# Patient Record
Sex: MALE | Race: WHITE | Employment: UNEMPLOYED | URBAN - METROPOLITAN AREA
[De-identification: names, ages, dates, MRNs, and addresses within clinical notes are randomized per-mention and may not be internally consistent; named-entity substitution may affect disease eponyms.]

---

## 2020-01-14 ENCOUNTER — APPOINTMENT (OUTPATIENT)
Dept: ULTRASOUND IMAGING | Age: 45
DRG: 439 | End: 2020-01-14
Attending: EMERGENCY MEDICINE
Payer: COMMERCIAL

## 2020-01-14 ENCOUNTER — APPOINTMENT (OUTPATIENT)
Dept: CT IMAGING | Age: 45
DRG: 439 | End: 2020-01-14
Attending: INTERNAL MEDICINE
Payer: COMMERCIAL

## 2020-01-14 ENCOUNTER — HOSPITAL ENCOUNTER (INPATIENT)
Age: 45
LOS: 8 days | Discharge: HOME | DRG: 439 | End: 2020-01-22
Attending: EMERGENCY MEDICINE | Admitting: INTERNAL MEDICINE
Payer: COMMERCIAL

## 2020-01-14 DIAGNOSIS — F10.10 ALCOHOL ABUSE: Chronic | ICD-10-CM

## 2020-01-14 DIAGNOSIS — N17.9 ACUTE KIDNEY INJURY (HCC): ICD-10-CM

## 2020-01-14 DIAGNOSIS — E87.1 HYPONATREMIA: ICD-10-CM

## 2020-01-14 DIAGNOSIS — K85.20 ALCOHOL-INDUCED ACUTE PANCREATITIS, UNSPECIFIED COMPLICATION STATUS: Primary | ICD-10-CM

## 2020-01-14 PROBLEM — R73.9 HYPERGLYCEMIA: Status: ACTIVE | Noted: 2020-01-14

## 2020-01-14 PROBLEM — Z72.0 TOBACCO USE: Chronic | Status: ACTIVE | Noted: 2020-01-14

## 2020-01-14 PROBLEM — G89.29 CHRONIC PAIN: Chronic | Status: ACTIVE | Noted: 2020-01-14

## 2020-01-14 PROBLEM — I10 HYPERTENSION: Status: ACTIVE | Noted: 2020-01-14

## 2020-01-14 PROBLEM — K85.90 PANCREATITIS: Status: ACTIVE | Noted: 2020-01-14

## 2020-01-14 PROBLEM — F32.A DEPRESSION: Chronic | Status: ACTIVE | Noted: 2020-01-14

## 2020-01-14 LAB
ALBUMIN SERPL-MCNC: 3 G/DL (ref 3.5–5)
ALBUMIN/GLOB SERPL: 0.8 {RATIO} (ref 1.2–3.5)
ALP SERPL-CCNC: 196 U/L (ref 50–136)
ALT SERPL-CCNC: 239 U/L (ref 12–65)
AMPHET UR QL SCN: NEGATIVE
ANION GAP SERPL CALC-SCNC: 14 MMOL/L (ref 7–16)
APPEARANCE UR: ABNORMAL
AST SERPL-CCNC: 229 U/L (ref 15–37)
ATRIAL RATE: 146 BPM
BACTERIA URNS QL MICRO: 0 /HPF
BACTERIA URNS QL MICRO: ABNORMAL /HPF
BARBITURATES UR QL SCN: NEGATIVE
BENZODIAZ UR QL: NEGATIVE
BILIRUB SERPL-MCNC: 3.3 MG/DL (ref 0.2–1.1)
BILIRUB UR QL: ABNORMAL
BUN SERPL-MCNC: 15 MG/DL (ref 6–23)
CALCIUM SERPL-MCNC: 9.2 MG/DL (ref 8.3–10.4)
CALCULATED P AXIS, ECG09: 79 DEGREES
CALCULATED R AXIS, ECG10: 59 DEGREES
CALCULATED T AXIS, ECG11: 38 DEGREES
CANNABINOIDS UR QL SCN: POSITIVE
CASTS URNS QL MICRO: ABNORMAL /LPF
CASTS URNS QL MICRO: ABNORMAL /LPF
CHLORIDE SERPL-SCNC: 88 MMOL/L (ref 98–107)
CO2 SERPL-SCNC: 25 MMOL/L (ref 21–32)
COCAINE UR QL SCN: NEGATIVE
COLOR UR: ABNORMAL
CREAT SERPL-MCNC: 1.67 MG/DL (ref 0.8–1.5)
CRYSTALS URNS QL MICRO: 0 /LPF
DIAGNOSIS, 93000: NORMAL
EPI CELLS #/AREA URNS HPF: ABNORMAL /HPF
EST. AVERAGE GLUCOSE BLD GHB EST-MCNC: 120 MG/DL
GLOBULIN SER CALC-MCNC: 3.8 G/DL (ref 2.3–3.5)
GLUCOSE SERPL-MCNC: 149 MG/DL (ref 65–100)
GLUCOSE UR STRIP.AUTO-MCNC: 100 MG/DL
HBA1C MFR BLD: 5.8 % (ref 4.8–6)
HGB UR QL STRIP: NEGATIVE
INR PPP: 0.9
KETONES UR QL STRIP.AUTO: ABNORMAL MG/DL
LACTATE BLD-SCNC: 5.83 MMOL/L (ref 0.5–1.9)
LACTATE SERPL-SCNC: 5.1 MMOL/L (ref 0.4–2)
LEUKOCYTE ESTERASE UR QL STRIP.AUTO: ABNORMAL
LIPASE SERPL-CCNC: 2180 U/L (ref 73–393)
METHADONE UR QL: NEGATIVE
MUCOUS THREADS URNS QL MICRO: ABNORMAL /LPF
NITRITE UR QL STRIP.AUTO: POSITIVE
OPIATES UR QL: POSITIVE
OTHER OBSERVATIONS,UCOM: ABNORMAL
P-R INTERVAL, ECG05: 142 MS
PCP UR QL: NEGATIVE
PH UR STRIP: 6 [PH] (ref 5–9)
POTASSIUM SERPL-SCNC: 3.9 MMOL/L (ref 3.5–5.1)
PROT SERPL-MCNC: 6.8 G/DL (ref 6.3–8.2)
PROT UR STRIP-MCNC: 30 MG/DL
PROTHROMBIN TIME: 12.4 SEC (ref 11.7–14.5)
Q-T INTERVAL, ECG07: 270 MS
QRS DURATION, ECG06: 84 MS
QTC CALCULATION (BEZET), ECG08: 420 MS
RBC #/AREA URNS HPF: ABNORMAL /HPF
SODIUM SERPL-SCNC: 127 MMOL/L (ref 136–145)
SP GR UR REFRACTOMETRY: 1.02 (ref 1–1.02)
UROBILINOGEN UR QL STRIP.AUTO: 1 EU/DL (ref 0.2–1)
VENTRICULAR RATE, ECG03: 146 BPM
WBC URNS QL MICRO: ABNORMAL /HPF
WBC URNS QL MICRO: ABNORMAL /HPF

## 2020-01-14 PROCEDURE — 96375 TX/PRO/DX INJ NEW DRUG ADDON: CPT | Performed by: EMERGENCY MEDICINE

## 2020-01-14 PROCEDURE — 96361 HYDRATE IV INFUSION ADD-ON: CPT | Performed by: EMERGENCY MEDICINE

## 2020-01-14 PROCEDURE — 81001 URINALYSIS AUTO W/SCOPE: CPT

## 2020-01-14 PROCEDURE — 74011636320 HC RX REV CODE- 636/320: Performed by: EMERGENCY MEDICINE

## 2020-01-14 PROCEDURE — 99285 EMERGENCY DEPT VISIT HI MDM: CPT | Performed by: EMERGENCY MEDICINE

## 2020-01-14 PROCEDURE — 77030040361 HC SLV COMPR DVT MDII -B

## 2020-01-14 PROCEDURE — 80307 DRUG TEST PRSMV CHEM ANLYZR: CPT

## 2020-01-14 PROCEDURE — 83036 HEMOGLOBIN GLYCOSYLATED A1C: CPT

## 2020-01-14 PROCEDURE — 65660000000 HC RM CCU STEPDOWN

## 2020-01-14 PROCEDURE — 74011250636 HC RX REV CODE- 250/636: Performed by: EMERGENCY MEDICINE

## 2020-01-14 PROCEDURE — 74011000250 HC RX REV CODE- 250: Performed by: INTERNAL MEDICINE

## 2020-01-14 PROCEDURE — 74011250637 HC RX REV CODE- 250/637: Performed by: INTERNAL MEDICINE

## 2020-01-14 PROCEDURE — 83690 ASSAY OF LIPASE: CPT

## 2020-01-14 PROCEDURE — 83605 ASSAY OF LACTIC ACID: CPT

## 2020-01-14 PROCEDURE — 76705 ECHO EXAM OF ABDOMEN: CPT

## 2020-01-14 PROCEDURE — 74011250636 HC RX REV CODE- 250/636: Performed by: INTERNAL MEDICINE

## 2020-01-14 PROCEDURE — 81003 URINALYSIS AUTO W/O SCOPE: CPT | Performed by: EMERGENCY MEDICINE

## 2020-01-14 PROCEDURE — 85025 COMPLETE CBC W/AUTO DIFF WBC: CPT

## 2020-01-14 PROCEDURE — 74176 CT ABD & PELVIS W/O CONTRAST: CPT

## 2020-01-14 PROCEDURE — 80053 COMPREHEN METABOLIC PANEL: CPT

## 2020-01-14 PROCEDURE — 81015 MICROSCOPIC EXAM OF URINE: CPT

## 2020-01-14 PROCEDURE — 93005 ELECTROCARDIOGRAM TRACING: CPT | Performed by: EMERGENCY MEDICINE

## 2020-01-14 PROCEDURE — 96376 TX/PRO/DX INJ SAME DRUG ADON: CPT | Performed by: EMERGENCY MEDICINE

## 2020-01-14 PROCEDURE — 36415 COLL VENOUS BLD VENIPUNCTURE: CPT

## 2020-01-14 PROCEDURE — 96374 THER/PROPH/DIAG INJ IV PUSH: CPT | Performed by: EMERGENCY MEDICINE

## 2020-01-14 PROCEDURE — 85610 PROTHROMBIN TIME: CPT

## 2020-01-14 PROCEDURE — 77030027138 HC INCENT SPIROMETER -A

## 2020-01-14 RX ORDER — SODIUM CHLORIDE 0.9 % (FLUSH) 0.9 %
5-40 SYRINGE (ML) INJECTION EVERY 8 HOURS
Status: DISCONTINUED | OUTPATIENT
Start: 2020-01-14 | End: 2020-01-22 | Stop reason: HOSPADM

## 2020-01-14 RX ORDER — LORAZEPAM 2 MG/ML
1 INJECTION INTRAMUSCULAR
Status: DISCONTINUED | OUTPATIENT
Start: 2020-01-14 | End: 2020-01-18

## 2020-01-14 RX ORDER — ONDANSETRON 2 MG/ML
4 INJECTION INTRAMUSCULAR; INTRAVENOUS
Status: DISCONTINUED | OUTPATIENT
Start: 2020-01-14 | End: 2020-01-22 | Stop reason: HOSPADM

## 2020-01-14 RX ORDER — ALBUTEROL SULFATE 0.83 MG/ML
2.5 SOLUTION RESPIRATORY (INHALATION)
Status: DISCONTINUED | OUTPATIENT
Start: 2020-01-14 | End: 2020-01-22 | Stop reason: HOSPADM

## 2020-01-14 RX ORDER — NALOXONE HYDROCHLORIDE 0.4 MG/ML
0.4 INJECTION, SOLUTION INTRAMUSCULAR; INTRAVENOUS; SUBCUTANEOUS AS NEEDED
Status: DISCONTINUED | OUTPATIENT
Start: 2020-01-14 | End: 2020-01-22 | Stop reason: HOSPADM

## 2020-01-14 RX ORDER — SODIUM CHLORIDE 0.9 % (FLUSH) 0.9 %
5-40 SYRINGE (ML) INJECTION AS NEEDED
Status: DISCONTINUED | OUTPATIENT
Start: 2020-01-14 | End: 2020-01-22 | Stop reason: HOSPADM

## 2020-01-14 RX ORDER — HYDRALAZINE HYDROCHLORIDE 20 MG/ML
10 INJECTION INTRAMUSCULAR; INTRAVENOUS
Status: DISCONTINUED | OUTPATIENT
Start: 2020-01-14 | End: 2020-01-22 | Stop reason: HOSPADM

## 2020-01-14 RX ORDER — ONDANSETRON 2 MG/ML
4 INJECTION INTRAMUSCULAR; INTRAVENOUS
Status: COMPLETED | OUTPATIENT
Start: 2020-01-14 | End: 2020-01-14

## 2020-01-14 RX ORDER — SODIUM CHLORIDE 9 MG/ML
75 INJECTION, SOLUTION INTRAVENOUS CONTINUOUS
Status: DISCONTINUED | OUTPATIENT
Start: 2020-01-14 | End: 2020-01-21

## 2020-01-14 RX ORDER — VENLAFAXINE HYDROCHLORIDE 75 MG/1
150 CAPSULE, EXTENDED RELEASE ORAL
Status: DISCONTINUED | OUTPATIENT
Start: 2020-01-15 | End: 2020-01-22 | Stop reason: HOSPADM

## 2020-01-14 RX ORDER — MORPHINE SULFATE 4 MG/ML
4 INJECTION INTRAVENOUS
Status: COMPLETED | OUTPATIENT
Start: 2020-01-14 | End: 2020-01-14

## 2020-01-14 RX ORDER — METOPROLOL TARTRATE 5 MG/5ML
5 INJECTION INTRAVENOUS
Status: DISCONTINUED | OUTPATIENT
Start: 2020-01-14 | End: 2020-01-22 | Stop reason: HOSPADM

## 2020-01-14 RX ORDER — HYDROMORPHONE HYDROCHLORIDE 1 MG/ML
1 INJECTION, SOLUTION INTRAMUSCULAR; INTRAVENOUS; SUBCUTANEOUS
Status: DISCONTINUED | OUTPATIENT
Start: 2020-01-14 | End: 2020-01-22 | Stop reason: HOSPADM

## 2020-01-14 RX ORDER — HYDROMORPHONE HYDROCHLORIDE 1 MG/ML
1 INJECTION, SOLUTION INTRAMUSCULAR; INTRAVENOUS; SUBCUTANEOUS
Status: COMPLETED | OUTPATIENT
Start: 2020-01-14 | End: 2020-01-14

## 2020-01-14 RX ORDER — IBUPROFEN 200 MG
1 TABLET ORAL EVERY 24 HOURS
Status: DISCONTINUED | OUTPATIENT
Start: 2020-01-14 | End: 2020-01-22 | Stop reason: HOSPADM

## 2020-01-14 RX ADMIN — DIATRIZOATE MEGLUMINE AND DIATRIZOATE SODIUM 15 ML: 660; 100 LIQUID ORAL; RECTAL at 17:14

## 2020-01-14 RX ADMIN — HYDROMORPHONE HYDROCHLORIDE 1 MG: 1 INJECTION, SOLUTION INTRAMUSCULAR; INTRAVENOUS; SUBCUTANEOUS at 15:46

## 2020-01-14 RX ADMIN — HYDROMORPHONE HYDROCHLORIDE 1 MG: 1 INJECTION, SOLUTION INTRAMUSCULAR; INTRAVENOUS; SUBCUTANEOUS at 20:04

## 2020-01-14 RX ADMIN — MORPHINE SULFATE 4 MG: 4 INJECTION INTRAVENOUS at 14:14

## 2020-01-14 RX ADMIN — SODIUM CHLORIDE 1000 ML: 900 INJECTION, SOLUTION INTRAVENOUS at 14:14

## 2020-01-14 RX ADMIN — Medication 10 ML: at 18:48

## 2020-01-14 RX ADMIN — METOPROLOL TARTRATE 5 MG: 5 INJECTION INTRAVENOUS at 20:03

## 2020-01-14 RX ADMIN — SODIUM CHLORIDE 1000 ML: 900 INJECTION, SOLUTION INTRAVENOUS at 12:59

## 2020-01-14 RX ADMIN — MORPHINE SULFATE 4 MG: 4 INJECTION INTRAVENOUS at 12:58

## 2020-01-14 RX ADMIN — SODIUM CHLORIDE 150 ML/HR: 900 INJECTION, SOLUTION INTRAVENOUS at 18:47

## 2020-01-14 RX ADMIN — ONDANSETRON 4 MG: 2 INJECTION INTRAMUSCULAR; INTRAVENOUS at 12:58

## 2020-01-14 RX ADMIN — HYDRALAZINE HYDROCHLORIDE 10 MG: 20 INJECTION, SOLUTION INTRAMUSCULAR; INTRAVENOUS at 16:53

## 2020-01-14 NOTE — ED PROVIDER NOTES
43-year-old white male presents with epigastric pain which began yesterday morning. Patient has had no intake since then due to the pain. He denies vomiting, fever, diarrhea, chest pain and shortness of breath. Does drink alcohol. Has never had pancreatitis before. Takes Suboxone for opiate addiction. Last dose was yesterday evening. The history is provided by the patient. Abdominal Pain    Pertinent negatives include no fever, no diarrhea, no nausea, no vomiting, no dysuria, no headaches, no chest pain and no back pain. No past medical history on file. No past surgical history on file. No family history on file.     Social History     Socioeconomic History    Marital status: SINGLE     Spouse name: Not on file    Number of children: Not on file    Years of education: Not on file    Highest education level: Not on file   Occupational History    Not on file   Social Needs    Financial resource strain: Not on file    Food insecurity:     Worry: Not on file     Inability: Not on file    Transportation needs:     Medical: Not on file     Non-medical: Not on file   Tobacco Use    Smoking status: Not on file   Substance and Sexual Activity    Alcohol use: Not on file    Drug use: Not on file    Sexual activity: Not on file   Lifestyle    Physical activity:     Days per week: Not on file     Minutes per session: Not on file    Stress: Not on file   Relationships    Social connections:     Talks on phone: Not on file     Gets together: Not on file     Attends Caodaism service: Not on file     Active member of club or organization: Not on file     Attends meetings of clubs or organizations: Not on file     Relationship status: Not on file    Intimate partner violence:     Fear of current or ex partner: Not on file     Emotionally abused: Not on file     Physically abused: Not on file     Forced sexual activity: Not on file   Other Topics Concern    Not on file   Social History Narrative    Not on file         ALLERGIES: Patient has no known allergies. Review of Systems   Constitutional: Negative for fever. HENT: Negative for congestion. Respiratory: Negative for cough and shortness of breath. Cardiovascular: Negative for chest pain. Gastrointestinal: Positive for abdominal pain. Negative for diarrhea, nausea and vomiting. Genitourinary: Negative for dysuria. Musculoskeletal: Negative for back pain and neck pain. Skin: Negative for rash. Neurological: Negative for headaches. Vitals:    01/14/20 1212 01/14/20 1245 01/14/20 1252 01/14/20 1259   BP: (!) 136/100 (!) 200/126 (!) 170/111 (!) 170/117   Pulse: 64 (!) 131 (!) 135 (!) 129   Resp: 17      Temp: 98 °F (36.7 °C)      SpO2: 98% 98% 96% 96%            Physical Exam  Vitals signs and nursing note reviewed. Constitutional:       General: He is not in acute distress. Appearance: He is well-developed. He is ill-appearing and diaphoretic. HENT:      Head: Normocephalic and atraumatic. Nose: Nose normal.      Mouth/Throat:      Mouth: Mucous membranes are moist.      Pharynx: Oropharynx is clear. Eyes:      Conjunctiva/sclera: Conjunctivae normal.      Pupils: Pupils are equal, round, and reactive to light. Neck:      Musculoskeletal: Normal range of motion and neck supple. Cardiovascular:      Rate and Rhythm: Regular rhythm. Tachycardia present. Heart sounds: No murmur. Pulmonary:      Effort: Pulmonary effort is normal.      Breath sounds: Normal breath sounds. No rales. Abdominal:      Palpations: Abdomen is soft. Tenderness: There is tenderness in the right upper quadrant, epigastric area and left upper quadrant. There is no guarding or rebound. Musculoskeletal: Normal range of motion. General: No swelling. Skin:     General: Skin is warm. Neurological:      Mental Status: He is alert and oriented to person, place, and time. Mental status is at baseline. Psychiatric:         Mood and Affect: Mood normal.         Behavior: Behavior normal.          MDM  Number of Diagnoses or Management Options  Diagnosis management comments: Lab work shows elevated liver enzymes and lipase 2180, hyponatremia 127 and a mild dehydration with creatinine 1.67. Elevated likely due to hemoconcentration. Gallbladder ultrasound shows no evidence of gallstones or obstruction. Patient is still uncomfortable after 2 doses of morphine and 2 L of fluid. We will switch to Dilaudid. Suspect I am having difficulty controlling his pain due to the fact that the patient is on Suboxone. Suspect his pancreatitis is alcohol related. Will discuss with hospitalist for admission.        Amount and/or Complexity of Data Reviewed  Clinical lab tests: ordered and reviewed  Tests in the radiology section of CPT®: ordered and reviewed  Tests in the medicine section of CPT®: ordered and reviewed  Discuss the patient with other providers: yes  Independent visualization of images, tracings, or specimens: yes    Risk of Complications, Morbidity, and/or Mortality  Presenting problems: moderate  Diagnostic procedures: moderate  Management options: moderate           Procedures

## 2020-01-14 NOTE — ED NOTES
TRANSFER - IN REPORT:    Verbal report received from [facility], RN(name) on Jameel Melgar  being received from [facility](unit) for routine progression of care      Report consisted of patients Situation, Background, Assessment and   Recommendations(SBAR). Information from the following report(s) SBAR, ED Summary, Intake/Output, MAR, Recent Results and Med Rec Status was reviewed with the receiving nurse. Opportunity for questions and clarification was provided. Assessment completed upon patients arrival to unit and care assumed.

## 2020-01-14 NOTE — H&P
Hospitalist H&P Note     Admit Date:  2020 12:33 PM   Name:  Linda Yates   Age:  45 y.o.  :  1975   MRN:  [id number]   PCP:  None  Treatment Team: Attending Provider: Bernarda Huff MD; Primary Nurse: Ramy Barriga RN    HPI:     CC:  Abdominal pain         Mr. Dena Wyatt is a 46 yo male with PMH of ETOH use, tobacco use, depression, chronic pain on suboxone per pain management who is evaluated with 1 day of midabdominal pain. He states that he consumes 6-8 beers daily-no prior withdrawals. Lipase elevated, ABD US shows hepatic steatosis, unable to visualize pancrease. Lactate elevated, with additional hyponatremia, ENOC, elevated liver testing. parents present    10 systems reviewed and negative except as noted in HPI. PMH- ETOH use, tobacco use, depression, chronic pain on suboxone per pain management    Denies prior surgery     No Known Allergies   Social History     Tobacco Use    Smoking status: Not on file   Substance Use Topics    Alcohol use: Not on file      FH- HTN    There is no immunization history on file for this patient. PTA Medications:  None       Objective:     Patient Vitals for the past 24 hrs:   Temp Pulse Resp BP SpO2   20 1330  (!) 115  (!) 175/127 96 %   20 1259  (!) 129  (!) 170/117 96 %   20 1252  (!) 135  (!) 170/111 96 %   20 1245  (!) 131  (!) 200/126 98 %   20 1212 98 °F (36.7 °C) 64 17 (!) 136/100 98 %     Oxygen Therapy  O2 Sat (%): 96 % (20 1330)  Pulse via Oximetry: 115 beats per minute (20 1330)  O2 Device: Room air (20 1212)    Intake/Output Summary (Last 24 hours) at 2020 1644  Last data filed at 2020 1414  Gross per 24 hour   Intake 1000 ml   Output    Net 1000 ml       Physical Exam:  General:    Alert. No distress  Eyes:   Normal sclera. Extraocular movements intact.  PERRLA  ENT:  Normocephalic, atraumatic.  dry mucous membranes  CV:   Regular and tachycardic, No edema  Lungs:  CTAB. No wheezing, rhonchi, or rales. anterior  Abdomen: Tender diffusely, slightly distended. Present BS  Extremities: Warm and dry. .  Neurologic:  grossly intact. Skin:     No rashes or jaundice. Normal coloration  Psych:  Flat affect    I reviewed the labs, imaging, EKGs, telemetry, and other studies done this admission. EKG tracing personally reviewed as sinus tachycardia    Data Review:   Recent Results (from the past 24 hour(s))   CBC WITH AUTOMATED DIFF    Collection Time: 01/14/20 12:23 PM   Result Value Ref Range    WBC 9.7 4.3 - 11.1 K/uL    RBC 5.59 4.23 - 5.6 M/uL    HGB 17.9 (H) 13.6 - 17.2 g/dL    HCT 52.7 (H) 41.1 - 50.3 %    MCV 94.3 79.6 - 97.8 FL    MCH 32.0 26.1 - 32.9 PG    MCHC 34.0 31.4 - 35.0 g/dL    RDW 13.0 11.9 - 14.6 %    PLATELET 202 (L) 661 - 450 K/uL    MPV 13.4 (H) 9.4 - 12.3 FL    ABSOLUTE NRBC 0.00 0.0 - 0.2 K/uL    DF AUTOMATED      NEUTROPHILS 82 (H) 43 - 78 %    LYMPHOCYTES 9 (L) 13 - 44 %    MONOCYTES 6 4.0 - 12.0 %    EOSINOPHILS 2 0.5 - 7.8 %    BASOPHILS 0 0.0 - 2.0 %    IMMATURE GRANULOCYTES 0 0.0 - 5.0 %    ABS. NEUTROPHILS 8.3 (H) 1.7 - 8.2 K/UL    ABS. LYMPHOCYTES 0.9 0.5 - 4.6 K/UL    ABS. MONOCYTES 0.6 0.1 - 1.3 K/UL    ABS. EOSINOPHILS 0.2 0.0 - 0.8 K/UL    ABS. BASOPHILS 0.0 0.0 - 0.2 K/UL    ABS. IMM.  GRANS. 0.0 0.0 - 0.5 K/UL   POC LACTIC ACID    Collection Time: 01/14/20 12:25 PM   Result Value Ref Range    Lactic Acid (POC) 5.83 (H) 0.5 - 1.9 mmol/L   EKG, 12 LEAD, INITIAL    Collection Time: 01/14/20 12:31 PM   Result Value Ref Range    Ventricular Rate 146 BPM    Atrial Rate 146 BPM    P-R Interval 142 ms    QRS Duration 84 ms    Q-T Interval 270 ms    QTC Calculation (Bezet) 420 ms    Calculated P Axis 79 degrees    Calculated R Axis 59 degrees    Calculated T Axis 38 degrees    Diagnosis       Sinus tachycardia  Possible Left atrial enlargement  Possible Anterior infarct , age undetermined  Abnormal ECG  No previous ECGs available  Confirmed by ARNOL RIDDLE (), Alina Beardic ([id number]) on 1/14/2020 1:16:51 PM     LIPASE    Collection Time: 01/14/20 12:51 PM   Result Value Ref Range    Lipase 2,180 (H) 73 - 124 U/L   METABOLIC PANEL, COMPREHENSIVE    Collection Time: 01/14/20 12:51 PM   Result Value Ref Range    Sodium 127 (L) 136 - 145 mmol/L    Potassium 3.9 3.5 - 5.1 mmol/L    Chloride 88 (L) 98 - 107 mmol/L    CO2 25 21 - 32 mmol/L    Anion gap 14 7 - 16 mmol/L    Glucose 149 (H) 65 - 100 mg/dL    BUN 15 6 - 23 MG/DL    Creatinine 1.67 (H) 0.8 - 1.5 MG/DL    GFR est AA 58 (L) >60 ml/min/1.73m2    GFR est non-AA 48 (L) >60 ml/min/1.73m2    Calcium 9.2 8.3 - 10.4 MG/DL    Bilirubin, total 3.3 (H) 0.2 - 1.1 MG/DL    ALT (SGPT) 239 (H) 12 - 65 U/L    AST (SGOT) 229 (H) 15 - 37 U/L    Alk. phosphatase 196 (H) 50 - 136 U/L    Protein, total 6.8 6.3 - 8.2 g/dL    Albumin 3.0 (L) 3.5 - 5.0 g/dL    Globulin 3.8 (H) 2.3 - 3.5 g/dL    A-G Ratio 0.8 (L) 1.2 - 3.5     URINE MICROSCOPIC    Collection Time: 01/14/20  2:24 PM   Result Value Ref Range    WBC 3-5 0 /hpf    RBC 0-3 0 /hpf    Epithelial cells 0-3 0 /hpf    Bacteria TRACE 0 /hpf    Casts HYALINE 0 /lpf    Crystals, urine 0 0 /LPF    Mucus 2+ (H) 0 /lpf       All Micro Results     None          Other Studies:  [employer]    Result Date: 1/14/2020  Right upper quadrant ultrasound: 01/14/2020 HISTORY: Moderate epigastric pain x7 days. Pancreatitis. . Real-time sonography of the right upper quadrant was performed. Comparison: None FINDINGS: There is increased echotexture throughout the liver. There is no intrahepatic biliary ductal dilatation. There is no focal hepatic mass. The gallbladder is anechoic without evidence of cholelithiasis. There is no gallbladder wall thickening or pericholecystic fluid to suggest acute cholecystitis. The common bile duct is normal  measuring 4 mm. The pancreas is unable to be visualized as it was obscured by overlying bowel gas.  The right kidney is unremarkable without hydronephrosis or solid mass. The right kidney was measured at approximately 12.9 cm. The visualized portions of the abdominal aorta and inferior vena cava are unremarkable. There is normal hepatopetal flow within the main portal vein. No free fluid is seen within the right upper quadrant. IMPRESSION:  1. Hepatic steatosis. 2. Nonvisualization of the pancreas due to overlying bowel gas.        Assessment and Plan:     Hospital Problems as of 1/14/2020 Never Reviewed          Codes Class Noted - Resolved POA    * (Principal) Pancreatitis ICD-10-CM: K85.90  ICD-9-CM: 487.6  1/14/2020 - Present Yes        Chronic pain (Chronic) ICD-10-CM: S46.58  ICD-9-CM: 338.29  1/14/2020 - Present Yes        Alcohol abuse (Chronic) ICD-10-CM: F10.10  ICD-9-CM: 305.00  1/14/2020 - Present Yes        Hypertension ICD-10-CM: I10  ICD-9-CM: 401.9  1/14/2020 - Present Yes        Depression (Chronic) ICD-10-CM: F32.9  ICD-9-CM: 515  1/14/2020 - Present Yes        Tobacco use (Chronic) ICD-10-CM: Z72.0  ICD-9-CM: 305.1  1/14/2020 - Present Yes        ENOC (acute kidney injury) ([facility] [address] ICD-10-CM: N17.9  ICD-9-CM: 584.9  1/14/2020 - Present Yes        Hyponatremia ICD-10-CM: E87.1  ICD-9-CM: 276.1  1/14/2020 - Present Yes        Hyperglycemia ICD-10-CM: R73.9  ICD-9-CM: 790.29  1/14/2020 - Present Yes              · ETOH pancreatitis: admit to remote tele, NPO, NS at 150 cc /hr, pain control, CTAP and trend lactate  · ETOH abuse: needs cessation , monitor for withdrawals, supplement thiamine and folate   · Tobacco use: needs cessation, add nicotine patch and prn albuterol   · Hyponatremia: seems due to volume depletion, hydrate and repeat lab  · Hyperglycemia: check A1c  · ELEVATED LIVER TESTING: check INR, hepatitis panel and HIV testing   · ENOC: hydrate and repeat BMP,check UA  · HTN: add prn hydralazine, may need chronic treatment   · Chronic pain: holding suboxone while admitted   · Depression: resume effexor    Discharge planning:  Pending to home  DVT ppx: SCD  Code status:  Full  Estimated LOS:  Greater than 2 midnights  Risk:  high  Care plan: mother Anaya Monteiro [phone]  Signed:  Tito Bhatia MD

## 2020-01-14 NOTE — ED TRIAGE NOTES
Patient reports mid abdominal pain starting yesterday morning. Denies n/v/d or changes in bowel movements. Patient is very sweaty in triage. Denies fever or chills. Pain worse with deep breathing.

## 2020-01-15 ENCOUNTER — APPOINTMENT (OUTPATIENT)
Dept: MRI IMAGING | Age: 45
DRG: 439 | End: 2020-01-15
Attending: INTERNAL MEDICINE
Payer: COMMERCIAL

## 2020-01-15 LAB
ALBUMIN SERPL-MCNC: 2.2 G/DL (ref 3.5–5)
ALBUMIN/GLOB SERPL: 0.7 {RATIO} (ref 1.2–3.5)
ALP SERPL-CCNC: 131 U/L (ref 50–136)
ALT SERPL-CCNC: 130 U/L (ref 12–65)
ANION GAP SERPL CALC-SCNC: 6 MMOL/L (ref 7–16)
AST SERPL-CCNC: 97 U/L (ref 15–37)
BASOPHILS # BLD: 0 K/UL (ref 0–0.2)
BASOPHILS NFR BLD: 0 % (ref 0–2)
BILIRUB SERPL-MCNC: 2.4 MG/DL (ref 0.2–1.1)
BUN SERPL-MCNC: 15 MG/DL (ref 6–23)
CALCIUM SERPL-MCNC: 8.2 MG/DL (ref 8.3–10.4)
CHLORIDE SERPL-SCNC: 98 MMOL/L (ref 98–107)
CO2 SERPL-SCNC: 28 MMOL/L (ref 21–32)
CREAT SERPL-MCNC: 0.92 MG/DL (ref 0.8–1.5)
DIFFERENTIAL METHOD BLD: ABNORMAL
EOSINOPHIL # BLD: 0 K/UL (ref 0–0.8)
EOSINOPHIL NFR BLD: 0 % (ref 0.5–7.8)
ERYTHROCYTE [DISTWIDTH] IN BLOOD BY AUTOMATED COUNT: 13 % (ref 11.9–14.6)
GLOBULIN SER CALC-MCNC: 3.1 G/DL (ref 2.3–3.5)
GLUCOSE BLD STRIP.AUTO-MCNC: 119 MG/DL (ref 65–100)
GLUCOSE BLD STRIP.AUTO-MCNC: 131 MG/DL (ref 65–100)
GLUCOSE BLD STRIP.AUTO-MCNC: 145 MG/DL (ref 65–100)
GLUCOSE BLD STRIP.AUTO-MCNC: 164 MG/DL (ref 65–100)
GLUCOSE SERPL-MCNC: 125 MG/DL (ref 65–100)
HCT VFR BLD AUTO: 43.1 % (ref 41.1–50.3)
HGB BLD-MCNC: 15 G/DL (ref 13.6–17.2)
IMM GRANULOCYTES # BLD AUTO: 0 K/UL (ref 0–0.5)
IMM GRANULOCYTES NFR BLD AUTO: 0 % (ref 0–5)
LACTATE SERPL-SCNC: 1.7 MMOL/L (ref 0.4–2)
LACTATE SERPL-SCNC: 2.1 MMOL/L (ref 0.4–2)
LIPASE SERPL-CCNC: 731 U/L (ref 73–393)
LYMPHOCYTES # BLD: 1.1 K/UL (ref 0.5–4.6)
LYMPHOCYTES NFR BLD: 16 % (ref 13–44)
MCH RBC QN AUTO: 31.8 PG (ref 26.1–32.9)
MCHC RBC AUTO-ENTMCNC: 34.8 G/DL (ref 31.4–35)
MCV RBC AUTO: 91.5 FL (ref 79.6–97.8)
MONOCYTES # BLD: 0.7 K/UL (ref 0.1–1.3)
MONOCYTES NFR BLD: 10 % (ref 4–12)
NEUTS SEG # BLD: 5.1 K/UL (ref 1.7–8.2)
NEUTS SEG NFR BLD: 74 % (ref 43–78)
NRBC # BLD: 0 K/UL (ref 0–0.2)
PLATELET # BLD AUTO: 70 K/UL (ref 150–450)
PMV BLD AUTO: 11.6 FL (ref 9.4–12.3)
POTASSIUM SERPL-SCNC: 4.2 MMOL/L (ref 3.5–5.1)
PROT SERPL-MCNC: 5.3 G/DL (ref 6.3–8.2)
RBC # BLD AUTO: 4.71 M/UL (ref 4.23–5.6)
SODIUM SERPL-SCNC: 132 MMOL/L (ref 136–145)
WBC # BLD AUTO: 6.9 K/UL (ref 4.3–11.1)

## 2020-01-15 PROCEDURE — 74181 MRI ABDOMEN W/O CONTRAST: CPT

## 2020-01-15 PROCEDURE — 85025 COMPLETE CBC W/AUTO DIFF WBC: CPT

## 2020-01-15 PROCEDURE — 65660000000 HC RM CCU STEPDOWN

## 2020-01-15 PROCEDURE — 80053 COMPREHEN METABOLIC PANEL: CPT

## 2020-01-15 PROCEDURE — 74011250637 HC RX REV CODE- 250/637: Performed by: NURSE PRACTITIONER

## 2020-01-15 PROCEDURE — 82962 GLUCOSE BLOOD TEST: CPT

## 2020-01-15 PROCEDURE — 74011250637 HC RX REV CODE- 250/637: Performed by: INTERNAL MEDICINE

## 2020-01-15 PROCEDURE — 77030020263 HC SOL INJ SOD CL0.9% LFCR 1000ML

## 2020-01-15 PROCEDURE — 83690 ASSAY OF LIPASE: CPT

## 2020-01-15 PROCEDURE — 74011250636 HC RX REV CODE- 250/636: Performed by: INTERNAL MEDICINE

## 2020-01-15 PROCEDURE — 74011000250 HC RX REV CODE- 250: Performed by: INTERNAL MEDICINE

## 2020-01-15 PROCEDURE — 36415 COLL VENOUS BLD VENIPUNCTURE: CPT

## 2020-01-15 PROCEDURE — 83605 ASSAY OF LACTIC ACID: CPT

## 2020-01-15 PROCEDURE — 74011000258 HC RX REV CODE- 258: Performed by: INTERNAL MEDICINE

## 2020-01-15 RX ORDER — DICYCLOMINE HYDROCHLORIDE 10 MG/1
10 CAPSULE ORAL 4 TIMES DAILY
Status: DISCONTINUED | OUTPATIENT
Start: 2020-01-15 | End: 2020-01-22 | Stop reason: HOSPADM

## 2020-01-15 RX ORDER — VENLAFAXINE HYDROCHLORIDE 150 MG/1
150 CAPSULE, EXTENDED RELEASE ORAL DAILY
COMMUNITY

## 2020-01-15 RX ORDER — BUPRENORPHINE AND NALOXONE 8; 2 MG/1; MG/1
1 FILM, SOLUBLE BUCCAL; SUBLINGUAL 2 TIMES DAILY
COMMUNITY

## 2020-01-15 RX ADMIN — LORAZEPAM 1 MG: 2 INJECTION INTRAMUSCULAR; INTRAVENOUS at 21:13

## 2020-01-15 RX ADMIN — HYDROMORPHONE HYDROCHLORIDE 1 MG: 1 INJECTION, SOLUTION INTRAMUSCULAR; INTRAVENOUS; SUBCUTANEOUS at 09:37

## 2020-01-15 RX ADMIN — SODIUM CHLORIDE 150 ML/HR: 900 INJECTION, SOLUTION INTRAVENOUS at 18:42

## 2020-01-15 RX ADMIN — HYDROMORPHONE HYDROCHLORIDE 1 MG: 1 INJECTION, SOLUTION INTRAMUSCULAR; INTRAVENOUS; SUBCUTANEOUS at 22:25

## 2020-01-15 RX ADMIN — DICYCLOMINE HYDROCHLORIDE 10 MG: 10 CAPSULE ORAL at 14:29

## 2020-01-15 RX ADMIN — HYDROMORPHONE HYDROCHLORIDE 1 MG: 1 INJECTION, SOLUTION INTRAMUSCULAR; INTRAVENOUS; SUBCUTANEOUS at 14:36

## 2020-01-15 RX ADMIN — Medication 10 ML: at 18:42

## 2020-01-15 RX ADMIN — THIAMINE HYDROCHLORIDE: 100 INJECTION, SOLUTION INTRAMUSCULAR; INTRAVENOUS at 09:28

## 2020-01-15 RX ADMIN — METOPROLOL TARTRATE 5 MG: 5 INJECTION INTRAVENOUS at 11:45

## 2020-01-15 RX ADMIN — METOPROLOL TARTRATE 5 MG: 5 INJECTION INTRAVENOUS at 05:23

## 2020-01-15 RX ADMIN — LORAZEPAM 1 MG: 2 INJECTION INTRAMUSCULAR; INTRAVENOUS at 01:30

## 2020-01-15 RX ADMIN — SODIUM CHLORIDE 150 ML/HR: 900 INJECTION, SOLUTION INTRAVENOUS at 00:22

## 2020-01-15 RX ADMIN — HYDROMORPHONE HYDROCHLORIDE 1 MG: 1 INJECTION, SOLUTION INTRAMUSCULAR; INTRAVENOUS; SUBCUTANEOUS at 18:47

## 2020-01-15 RX ADMIN — HYDROMORPHONE HYDROCHLORIDE 1 MG: 1 INJECTION, SOLUTION INTRAMUSCULAR; INTRAVENOUS; SUBCUTANEOUS at 00:19

## 2020-01-15 RX ADMIN — HYDRALAZINE HYDROCHLORIDE 10 MG: 20 INJECTION, SOLUTION INTRAMUSCULAR; INTRAVENOUS at 21:11

## 2020-01-15 RX ADMIN — SODIUM CHLORIDE 150 ML/HR: 900 INJECTION, SOLUTION INTRAVENOUS at 22:14

## 2020-01-15 RX ADMIN — SODIUM CHLORIDE 150 ML/HR: 900 INJECTION, SOLUTION INTRAVENOUS at 09:28

## 2020-01-15 RX ADMIN — DICYCLOMINE HYDROCHLORIDE 10 MG: 10 CAPSULE ORAL at 21:13

## 2020-01-15 RX ADMIN — DICYCLOMINE HYDROCHLORIDE 10 MG: 10 CAPSULE ORAL at 18:40

## 2020-01-15 RX ADMIN — VENLAFAXINE HYDROCHLORIDE 150 MG: 75 CAPSULE, EXTENDED RELEASE ORAL at 09:29

## 2020-01-15 RX ADMIN — HYDROMORPHONE HYDROCHLORIDE 1 MG: 1 INJECTION, SOLUTION INTRAMUSCULAR; INTRAVENOUS; SUBCUTANEOUS at 04:15

## 2020-01-15 RX ADMIN — Medication 10 ML: at 21:16

## 2020-01-15 NOTE — PROGRESS NOTES
Pt arrived from the ED at ~ 1850 via stretcher. Skin assessment complete with Gail Walker.  Pt repositioned in bed, educated on call light use, verbalizes understanding

## 2020-01-15 NOTE — PROGRESS NOTES
Spiritual Care Visit, initial visit. Visited with patient at bedside. Prayed for patient's healing and health. Visit by Norma Rico, Staff .  Sheila., Th.B., B.A.

## 2020-01-15 NOTE — PROGRESS NOTES
TRANSFER - IN REPORT:    Verbal report received from [employer] RN on Bárbara Gip  being received from ED for routine progression of care      Report consisted of patients Situation, Background, Assessment and   Recommendations(SBAR). Information from the following report(s) SBAR, Kardex and ED Summary was reviewed with the receiving nurse. Opportunity for questions and clarification was provided. Assessment completed upon patients arrival to unit and care assumed.

## 2020-01-15 NOTE — PROGRESS NOTES
[facility] Progress Note         Admit Date:  1/14/2020    Today's Date:  1/15/2020    CC:  Elevated liver chemistry    Subjective:     Patient S/P MRCP this morning with results pending. Thirsty but understands NPO for now. Abd pain improved. Medications:   Current Facility-Administered Medications   Medication Dose Route Frequency    sodium chloride (NS) flush 5-40 mL  5-40 mL IntraVENous Q8H    sodium chloride (NS) flush 5-40 mL  5-40 mL IntraVENous PRN    HYDROmorphone (PF) (DILAUDID) injection 1 mg  1 mg IntraVENous Q4H PRN    naloxone (NARCAN) injection 0.4 mg  0.4 mg IntraVENous PRN    ondansetron (ZOFRAN) injection 4 mg  4 mg IntraVENous Q4H PRN    0.9% sodium chloride infusion  150 mL/hr IntraVENous CONTINUOUS    LORazepam (ATIVAN) injection 1 mg  1 mg IntraVENous B5V PRN    folic acid (FOLVITE) 1 mg, thiamine (B-1) 100 mg in 0.9% sodium chloride 50 mL ivpb   IntraVENous DAILY    nicotine (NICODERM CQ) 21 mg/24 hr patch 1 Patch  1 Patch TransDERmal Q24H    hydrALAZINE (APRESOLINE) 20 mg/mL injection 10 mg  10 mg IntraVENous Q6H PRN    venlafaxine-SR (EFFEXOR-XR) capsule 150 mg  150 mg Oral DAILY WITH BREAKFAST    albuterol (PROVENTIL VENTOLIN) nebulizer solution 2.5 mg  2.5 mg Nebulization Q4H PRN    metoprolol (LOPRESSOR) injection 5 mg  5 mg IntraVENous Q4H PRN       Review of Systems:  ROS was obtained, with pertinent positives as listed above. No chest pain or SOB. Diet:      Objective:   Vitals:  Visit Vitals  /89   Pulse (!) 130   Temp 97.9 °F (36.6 °C)   Resp 18   Wt 106.6 kg (235 lb)   SpO2 94%   BMI 31.00 kg/m²     Intake/Output:  No intake/output data recorded. 01/13 1901 - 01/15 0700  In: 2383.4 [I.V.:2383.4]  Out: 80 [Urine:410]  Exam:  General appearance: alert, cooperative, no distress  Lungs: clear to auscultation bilaterally anteriorly  Heart: regular rate and rhythm  Abdomen: soft, non-tender.  Bowel sounds normal. No masses, no organomegaly  Extremities: extremities normal, atraumatic, no cyanosis or edema  Neuro:  alert and oriented    Data Review (Labs):    Recent Labs     01/15/20  0448 01/14/20  1251 01/14/20  1223   WBC 6.9  --  9.7   HGB 15.0  --  17.9*   HCT 43.1  --  52.7*   PLT 70*  --  145*   MCV 91.5  --  94.3   * 127*  --    K 4.2 3.9  --    CL 98 88*  --    CO2 28 25  --    BUN 15 15  --    CREA 0.92 1.67*  --    CA 8.2* 9.2  --    * 149*  --     196*  --    SGOT 97* 229*  --    * 239*  --    TBILI 2.4* 3.3*  --    ALB 2.2* 3.0*  --    TP 5.3* 6.8  --    LPSE 731* 2,180*  --    PTP  --  12.4  --    INR  --  0.9  --      CT OF THE ABDOMEN AND PELVIS 1/14/2020   INDICATION:  Pancreatitis and abdominal pain   Multiple axial images were obtained through the abdomen and pelvis without IV  contrast.  Oral contrast was used for bowel opacification. Radiation dose  reduction techniques were used for this study: All CT scans performed at this  facility use one or all of the following: Automated exposure control, adjustment  of the mA and/or kVp according to patient's size, iterative reconstruction.   COMPARISON: None   FINDINGS:  -LUNG BASES: There is a tiny left pleural effusion. Lung bases are otherwise  clear.   -LIVER: Mild fatty infiltration. No discrete liver mass. -GALLBLADDER/BILE DUCTS: No gallstones or bile duct dilatation. -PANCREAS: There is diffuse inflammation of the pancreas consistent with acute  pancreatitis. There is peripancreatic edema. There is no definite pseudocyst  or abscess. -SPLEEN: Normal.   -ADRENALS: Normal.  -KIDNEYS/URETERS: No hydronephrosis or significant mass. -BLADDER: Normal.  -REPRODUCTIVE ORGANS: No pelvic masses.   -BOWEL: Mild gastric and colon distention, likely ileus. -LYMPH NODES: No significant retroperitoneal, mesenteric, or pelvic adenopathy. -BONES: No fracture or significant bone lesion.   -VASCULATURE: Normal  -OTHER: Mild ascites, most prominent in the pelvis.   IMPRESSION  IMPRESSION: Acute pancreatitis and mild ascites        Assessment:     Principal Problem:    Pancreatitis (1/14/2020)    Active Problems:    Chronic pain (1/14/2020)      Alcohol abuse (1/14/2020)      Hypertension (1/14/2020)      Depression (1/14/2020)      Tobacco use (1/14/2020)      ENOC (acute kidney injury) ([facility] [address] (1/14/2020)      Hyponatremia (1/14/2020)      Hyperglycemia (1/14/2020)    45 y.o. male with hx of etoh abuse, tobacco abuse, depression, chronic pain on Suboxone,  is seen in consultation at the request of Dr. Milla Snow for acute pancreatitis with elevated LFT and lipase of 2180. Possible etiologies of acute pancreatitis can include etoh (admits to daily ETOH of 6-8 beers), gallstone pancreatitis (none seen on imaging but LFT are elevated), medications, hypertriglyceridemia, biliary obstruction (not seen on imaging). CT with acute pancreatitis and mild ascites. Plan:     1. Liver chemistry improved today  2. MRCP this morning; results pending  3. NPO until MRCP has resulted in the event that ERCP may be needed. Patient is seen and examined in collaboration with Dr. Mora Valdes. Assessment and plan as per Dr. Mora Valdes. Silviano Gonzalez WOOTEN    ATTENDING NOTE:  I have seen the patient and agree with the above assessment and plan. MRCP demonstrates evidence of necrotizing pancreatitis without extra hepatic biliary obstruction. LFTs have modestly improved since yesterday. 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 No evidence of infected necrosis to warrant treatment with antibiotics. Continue aggressive IV hydration. Okay to initiate clears today. I strongly advised alcohol cessation.      Al Galvan MD

## 2020-01-15 NOTE — PROGRESS NOTES
01/14/20 1900   Dual Skin Pressure Injury Assessment   Dual Skin Pressure Injury Assessment WDL   Second Care Provider (Based on [address] [place] [address]) Radha Verdin RN   Skin Integumentary   Skin Integumentary (WDL) WDL   Wound Prevention and Protection Methods   Orientation of Wound Prevention Posterior   Location of Wound Prevention Sacrum/Coccyx   Dressing Present  No   Wound Offloading (Prevention Methods) Bed, pressure reduction mattress;Repositioning

## 2020-01-15 NOTE — PROGRESS NOTES
Hospitalist Progress Note    Subjective:   Daily Progress Note: 1/15/2020 1403    Patient presents to ER 1/14 with complaints of mid abdominal pain x 24 hours with diaphoresis. Denies nausea, vomiting, diarrhea, but denies any po intake after pain began. No history of pancreatitis, but does drink alcohol daily, also taking suboxone with last dose 1/13. Blood pressure on arrival is 170/117 with heart rate of 135. LFTs elevated, lipase 2180. Hyponatremia to 127. Creatinine 1.67. Found with right upper quad, epigastric, and left upper quad pain. Admitted on IVF, NPO, GI consult, ETOH  withdrawal protocols. 1/15:  Found patient with continued pain but feeling much better this am.  Advancing diet to clear liquids. Lipase down to 731 today. Discussed probable etiology for first episode pancreatitis is alcohol related, informed patient he would have recurrent bouts of pancreatitis if he continues to drink. Father at bedside. Heavy ETOH consumption for years, has been abstinent a few times without signs of DTs. Encouraged to report any new symptoms. Remains intermittently tachycardic. Does not seem to need librium at present. Lactic down to 1.7 this am.      ADDITIONAL HISTORY:  Alcoholism, suboxone use, polysubstance use, depression.     Current Facility-Administered Medications   Medication Dose Route Frequency    sodium chloride (NS) flush 5-40 mL  5-40 mL IntraVENous Q8H    sodium chloride (NS) flush 5-40 mL  5-40 mL IntraVENous PRN    HYDROmorphone (PF) (DILAUDID) injection 1 mg  1 mg IntraVENous Q4H PRN    naloxone (NARCAN) injection 0.4 mg  0.4 mg IntraVENous PRN    ondansetron (ZOFRAN) injection 4 mg  4 mg IntraVENous Q4H PRN    0.9% sodium chloride infusion  150 mL/hr IntraVENous CONTINUOUS    LORazepam (ATIVAN) injection 1 mg  1 mg IntraVENous D5V PRN    folic acid (FOLVITE) 1 mg, thiamine (B-1) 100 mg in 0.9% sodium chloride 50 mL ivpb   IntraVENous DAILY    nicotine (NICODERM CQ) 21 mg/24 hr patch 1 Patch  1 Patch TransDERmal Q24H    hydrALAZINE (APRESOLINE) 20 mg/mL injection 10 mg  10 mg IntraVENous Q6H PRN    venlafaxine-SR (EFFEXOR-XR) capsule 150 mg  150 mg Oral DAILY WITH BREAKFAST    albuterol (PROVENTIL VENTOLIN) nebulizer solution 2.5 mg  2.5 mg Nebulization Q4H PRN    metoprolol (LOPRESSOR) injection 5 mg  5 mg IntraVENous Q4H PRN        Review of Systems  A comprehensive review of systems was negative except for that written in the HPI. Objective:     Visit Vitals  /89   Pulse (!) 130   Temp 97.9 °F (36.6 °C)   Resp 18   Wt 106.6 kg (235 lb)   SpO2 94%   BMI 31.00 kg/m²      O2 Device: Room air    Temp (24hrs), Av.3 °F (36.8 °C), Min:97.9 °F (36.6 °C), Max:98.8 °F (37.1 °C)     1901 - 01/15 0700  In: 2383.4 [I.V.:2383.4]  Out: 410 [Urine:410]    General appearance: Oriented and alert, cooperative,  Reports pain improved, but still present to entire upper abdomen. Well nourished, well hydrated. Father at bedside. Head: Normocephalic, without obvious abnormality, atraumatic  Eyes: conjunctivae/corneas clear. PERRL  Neck: supple, symmetrical, trachea midline, and no JVD  Lungs: clear to auscultation bilaterally  Heart:  Heart rate remains erratic, intermittently tachy. regular rate and rhythm, S1, S2 normal, no murmur, click, rub or gallop  Abdomen: Bloated, slightly tense, tenderness with palp all upper areas, none lower. Mild constipation. Bowel sounds normal. No masses,  no organomegaly  Extremities:  All extremities normal, atraumatic, no cyanosis or edema  Skin: Skin color, texture, turgor normal. No rashes or lesions  Neuro:  No signs of tremor, disorientation etc.   Additional comments: Notes,orders, test results, vitals reviewed    Data Review  Recent Results (from the past 24 hour(s))   CBC WITH AUTOMATED DIFF    Collection Time: 20 12:23 PM   Result Value Ref Range    WBC 9.7 4.3 - 11.1 K/uL    RBC 5.59 4.23 - 5.6 M/uL    HGB 17.9 (H) 13.6 - 17.2 g/dL    HCT 52.7 (H) 41.1 - 50.3 %    MCV 94.3 79.6 - 97.8 FL    MCH 32.0 26.1 - 32.9 PG    MCHC 34.0 31.4 - 35.0 g/dL    RDW 13.0 11.9 - 14.6 %    PLATELET 461 (L) 454 - 450 K/uL    MPV 13.4 (H) 9.4 - 12.3 FL    ABSOLUTE NRBC 0.00 0.0 - 0.2 K/uL    DF AUTOMATED      NEUTROPHILS 82 (H) 43 - 78 %    LYMPHOCYTES 9 (L) 13 - 44 %    MONOCYTES 6 4.0 - 12.0 %    EOSINOPHILS 2 0.5 - 7.8 %    BASOPHILS 0 0.0 - 2.0 %    IMMATURE GRANULOCYTES 0 0.0 - 5.0 %    ABS. NEUTROPHILS 8.3 (H) 1.7 - 8.2 K/UL    ABS. LYMPHOCYTES 0.9 0.5 - 4.6 K/UL    ABS. MONOCYTES 0.6 0.1 - 1.3 K/UL    ABS. EOSINOPHILS 0.2 0.0 - 0.8 K/UL    ABS. BASOPHILS 0.0 0.0 - 0.2 K/UL    ABS. IMM.  GRANS. 0.0 0.0 - 0.5 K/UL   POC LACTIC ACID    Collection Time: 01/14/20 12:25 PM   Result Value Ref Range    Lactic Acid (POC) 5.83 (H) 0.5 - 1.9 mmol/L   EKG, 12 LEAD, INITIAL    Collection Time: 01/14/20 12:31 PM   Result Value Ref Range    Ventricular Rate 146 BPM    Atrial Rate 146 BPM    P-R Interval 142 ms    QRS Duration 84 ms    Q-T Interval 270 ms    QTC Calculation (Bezet) 420 ms    Calculated P Axis 79 degrees    Calculated R Axis 59 degrees    Calculated T Axis 38 degrees    Diagnosis       Sinus tachycardia  Possible Left atrial enlargement  Possible Anterior infarct , age undetermined  Abnormal ECG  No previous ECGs available  Confirmed by ARNOL RIDDLE (), Gurmeet Salgado ([id number]) on 1/14/2020 1:16:51 PM     LIPASE    Collection Time: 01/14/20 12:51 PM   Result Value Ref Range    Lipase 2,180 (H) 73 - 748 U/L   METABOLIC PANEL, COMPREHENSIVE    Collection Time: 01/14/20 12:51 PM   Result Value Ref Range    Sodium 127 (L) 136 - 145 mmol/L    Potassium 3.9 3.5 - 5.1 mmol/L    Chloride 88 (L) 98 - 107 mmol/L    CO2 25 21 - 32 mmol/L    Anion gap 14 7 - 16 mmol/L    Glucose 149 (H) 65 - 100 mg/dL    BUN 15 6 - 23 MG/DL    Creatinine 1.67 (H) 0.8 - 1.5 MG/DL    GFR est AA 58 (L) >60 ml/min/1.73m2    GFR est non-AA 48 (L) >60 ml/min/1.73m2    Calcium 9.2 8.3 - 10.4 MG/DL    Bilirubin, total 3.3 (H) 0.2 - 1.1 MG/DL    ALT (SGPT) 239 (H) 12 - 65 U/L    AST (SGOT) 229 (H) 15 - 37 U/L    Alk.  phosphatase 196 (H) 50 - 136 U/L    Protein, total 6.8 6.3 - 8.2 g/dL    Albumin 3.0 (L) 3.5 - 5.0 g/dL    Globulin 3.8 (H) 2.3 - 3.5 g/dL    A-G Ratio 0.8 (L) 1.2 - 3.5     PROTHROMBIN TIME + INR    Collection Time: 01/14/20 12:51 PM   Result Value Ref Range    Prothrombin time 12.4 11.7 - 14.5 sec    INR 0.9     URINE MICROSCOPIC    Collection Time: 01/14/20  2:24 PM   Result Value Ref Range    WBC 3-5 0 /hpf    RBC 0-3 0 /hpf    Epithelial cells 0-3 0 /hpf    Bacteria TRACE 0 /hpf    Casts HYALINE 0 /lpf    Crystals, urine 0 0 /LPF    Mucus 2+ (H) 0 /lpf   LACTIC ACID    Collection Time: 01/14/20  7:14 PM   Result Value Ref Range    Lactic acid 5.1 (HH) 0.4 - 2.0 MMOL/L   HEMOGLOBIN A1C WITH EAG    Collection Time: 01/14/20  7:14 PM   Result Value Ref Range    Hemoglobin A1c 5.8 4.8 - 6.0 %    Est. average glucose 120 mg/dL   URINALYSIS W/ RFLX MICROSCOPIC    Collection Time: 01/14/20  7:35 PM   Result Value Ref Range    Color ORANGE      Appearance CLOUDY      Specific gravity 1.022 1.001 - 1.023      pH (UA) 6.0 5.0 - 9.0      Protein 30 (A) NEG mg/dL    Glucose 100 mg/dL    Ketone TRACE (A) NEG mg/dL    Bilirubin SMALL (A) NEG      Blood NEGATIVE  NEG      Urobilinogen 1.0 0.2 - 1.0 EU/dL    Nitrites POSITIVE (A) NEG      Leukocyte Esterase SMALL (A) NEG      WBC 5-10 0 /hpf    Bacteria 0 0 /hpf    Casts 10-20 0 /lpf    Other observations RESULTS VERIFIED MANUALLY     DRUG SCREEN, URINE    Collection Time: 01/14/20  7:35 PM   Result Value Ref Range    PCP(PHENCYCLIDINE) NEGATIVE       BENZODIAZEPINES NEGATIVE       COCAINE NEGATIVE       AMPHETAMINES NEGATIVE       METHADONE NEGATIVE       THC (TH-CANNABINOL) POSITIVE      OPIATES POSITIVE      BARBITURATES NEGATIVE      LACTIC ACID    Collection Time: 01/15/20 12:28 AM   Result Value Ref Range    Lactic acid 2.1 (HH) 0.4 - 2.0 MMOL/L LACTIC ACID    Collection Time: 01/15/20  4:48 AM   Result Value Ref Range    Lactic acid 1.7 0.4 - 2.0 MMOL/L   METABOLIC PANEL, COMPREHENSIVE    Collection Time: 01/15/20  4:48 AM   Result Value Ref Range    Sodium 132 (L) 136 - 145 mmol/L    Potassium 4.2 3.5 - 5.1 mmol/L    Chloride 98 98 - 107 mmol/L    CO2 28 21 - 32 mmol/L    Anion gap 6 (L) 7 - 16 mmol/L    Glucose 125 (H) 65 - 100 mg/dL    BUN 15 6 - 23 MG/DL    Creatinine 0.92 0.8 - 1.5 MG/DL    GFR est AA >60 >60 ml/min/1.73m2    GFR est non-AA >60 >60 ml/min/1.73m2    Calcium 8.2 (L) 8.3 - 10.4 MG/DL    Bilirubin, total 2.4 (H) 0.2 - 1.1 MG/DL    ALT (SGPT) 130 (H) 12 - 65 U/L    AST (SGOT) 97 (H) 15 - 37 U/L    Alk. phosphatase 131 50 - 136 U/L    Protein, total 5.3 (L) 6.3 - 8.2 g/dL    Albumin 2.2 (L) 3.5 - 5.0 g/dL    Globulin 3.1 2.3 - 3.5 g/dL    A-G Ratio 0.7 (L) 1.2 - 3.5     CBC WITH AUTOMATED DIFF    Collection Time: 01/15/20  4:48 AM   Result Value Ref Range    WBC 6.9 4.3 - 11.1 K/uL    RBC 4.71 4.23 - 5.6 M/uL    HGB 15.0 13.6 - 17.2 g/dL    HCT 43.1 41.1 - 50.3 %    MCV 91.5 79.6 - 97.8 FL    MCH 31.8 26.1 - 32.9 PG    MCHC 34.8 31.4 - 35.0 g/dL    RDW 13.0 11.9 - 14.6 %    PLATELET 70 (L) 335 - 450 K/uL    MPV 11.6 9.4 - 12.3 FL    ABSOLUTE NRBC 0.00 0.0 - 0.2 K/uL    NEUTROPHILS 74 43 - 78 %    LYMPHOCYTES 16 13 - 44 %    MONOCYTES 10 4.0 - 12.0 %    EOSINOPHILS 0 (L) 0.5 - 7.8 %    BASOPHILS 0 0.0 - 2.0 %    IMMATURE GRANULOCYTES 0 0.0 - 5.0 %    ABS. NEUTROPHILS 5.1 1.7 - 8.2 K/UL    ABS. LYMPHOCYTES 1.1 0.5 - 4.6 K/UL    ABS. MONOCYTES 0.7 0.1 - 1.3 K/UL    ABS. EOSINOPHILS 0.0 0.0 - 0.8 K/UL    ABS. BASOPHILS 0.0 0.0 - 0.2 K/UL    ABS. IMM. GRANS. 0.0 0.0 - 0.5 K/UL    DF AUTOMATED     LIPASE    Collection Time: 01/15/20  4:48 AM   Result Value Ref Range    Lipase 731 (H) 73 - 393 U/L      1/14:  ABDOMINAL ULTRASOUND: . Hepatic steatosis. Nonvisualization of the pancreas due to overlying bowel gas.     1/14:  CT ABDOMEN: Acute pancreatitis and mild ascites    1/15:  MRI ABDOMEN:  Persistent acute pancreatitis without MR evidence of choledocholithiasis. Findings suspicious for necrotizing pancreatitis involving the head neck and body of the pancreas, although not fully evaluated on this noncontrast MRI. Other chronic findings as above. Assessment/Plan:   Acute alcoholic pancreatitis, first episode:    Daily labs   Monitor pain and nausea   Diet increased to clear liquids today   Continue IVF   Bentyl ordered for cramping      Elevated LFTs   Hepatitis panel pending    INR, HIV testing     Polysubstance abuse: Attends suboxone clinic, last dose 1/13   Follow up on discharge    Acute UTI:  Culture pending   Begin rocephin 1/16     Chronic pain on suboxone   Continue narcs here for pain, avoiding tylenol    Alcohol abuse: Monitor for withdrawls   Ativan prn   Will begin librium if needed     Uncontrolled Hypertension    PRN Hydralazine, may need chronic treatment     Depression:  Home effexor      Longstanding ongoing Tobacco use:    Patch prn    Ativan prn    ENOC:  Continue IVF:  NS at 150 ml/hr    Monitor     Intermittent tachycardia to 150:  EKG with ST:  No history of same. ? Etiology.  Possibly due to illness and substance withdrawal    Lopressor prn    May need to add BB     Hyponatremia: replace and recheck: Most likely due to volume depletion    Continue to hydrate     Hyperglycemia:  A1C: 5.8   Due to disease process    Continue to monitor     Care Plan discussed with: Patient, father and Nurse    Signed By: Keisha Benavides NP     January 15, 2020

## 2020-01-15 NOTE — PROGRESS NOTES
Spoke to Mr. Marlen Park and his parents in room 205 about Case Management and discharge planning. He lives with his parents, and until recently, was working at his parent's auto repair shop. He is independent with all ADLs, including buying and drinking beer. He reports about 6 beers or so per day. The longest he has stopped drinking is for a day or two. Only discharge need identified is to stop drinking alcohol. We discussed AA (he said he has gone, but it has been years ago) and [facility]AV[facility]. Overall, he did not seem much interested in discussions about alcohol cessation, or about support groups. Another discharge needs is to find a PCP. Father goes to [facility] (patient is not a ) and mother goes to [facility]. However, they are ok with CM doing PCP referral.  An e-mail was sent to Ms. Ashwin Esteban about arranging a PCP.       Care Management Interventions  Current Support Network: Relative's Home

## 2020-01-15 NOTE — PROGRESS NOTES
Perfect serve to Hospitalist:    Critical from lab: Lactic acid: 5. Need Callback: Need Callback: NEEDS CALLBACK 2ND [facility]    9:15 PM  - Looks like its downtrending. Vitals ok? 9:16 PM  - Gave patient Lopressor and pulse is down to 108. with /98. Much better. Paln to give patient Ativan in a couple hours to settle him for the night. 9:17 PM  - Thats fine. Id let the HR ride as its probably compensating for the sepsis.  If it goes above 130 or the patient is symptomatic or looks worse clinically then let me know

## 2020-01-15 NOTE — CONSULTS
[facility] Consult Note       Primary GI Physician: Dr. Myrna Raya- marco    Referring Provider:  Dr. Cachorro Cui [facility]    Consult Date:  1/14/2020    Admit Date:  1/14/2020    Chief Complaint:  Acute pancreatitis    Subjective:     History of Present Illness:  Patient is a 45 y.o. male with PMH of ETOH abuse, Tobacco abuse, Depression, chronic pain on Suboxone , who is seen in consultation at the request of Dr. Mike Layne for acute pancreatitis. Mr. Darby Schaeffer presented to the ED on 1/14/2020 with complains of epigastric abdominal pain which started 2 days prior to admission. He denies pain like this before. He did not have any associated nausea/vomiting. Has not had any changes in bowel pattern. Denies any recent new medications. Labs on admission with TB 3.3, , , , lipase 2180, HGB 17, PlT 145, albumin 3.0, INR 0.9. U/S with findings of CBD 4mm, gallbladder anechoic without evidence of cholelithiasis, hepatic steatosis. CT showed fatty liver, diffuse inflammation of the pancreas consistent with acute pancreatitis, there is peripancreatic edema, mild ascities. Epigastric pain continues and is constant. He rates the pain at 8/10. He denies any new OTC supplements. Denies any Tylenol use or NSAIDs. Last BM was 2 days ago and was brown, formed. He has never had an EGD/colonoscopy for any reason. He has not seen his PCP in several years. There are no LFT to compare to. PMH:  ETOH use, tobacco use, depression, chronic pain on suboxone per pain management  PSH:  No past surgical history on file.     Allergies:  No Known Allergies    Home Medications:  Prior to Admission medications    Not on [facility] Medications:  Current Facility-Administered Medications   Medication Dose Route Frequency    sodium chloride (NS) flush 5-40 mL  5-40 mL IntraVENous Q8H    sodium chloride (NS) flush 5-40 mL  5-40 mL IntraVENous PRN    HYDROmorphone (PF) (DILAUDID) injection 1 mg  1 mg IntraVENous Q4H PRN    naloxone (NARCAN) injection 0.4 mg  0.4 mg IntraVENous PRN    ondansetron (ZOFRAN) injection 4 mg  4 mg IntraVENous Q4H PRN    0.9% sodium chloride infusion  150 mL/hr IntraVENous CONTINUOUS    LORazepam (ATIVAN) injection 1 mg  1 mg IntraVENous Q4H PRN    [START ON 2/05/7117] folic acid (FOLVITE) 1 mg, thiamine (B-1) 100 mg in 0.9% sodium chloride 50 mL ivpb   IntraVENous DAILY    nicotine (NICODERM CQ) 21 mg/24 hr patch 1 Patch  1 Patch TransDERmal Q24H    hydrALAZINE (APRESOLINE) 20 mg/mL injection 10 mg  10 mg IntraVENous Q6H PRN    [START ON 1/15/2020] venlafaxine-SR (EFFEXOR-XR) capsule 150 mg  150 mg Oral DAILY WITH BREAKFAST    albuterol (PROVENTIL VENTOLIN) nebulizer solution 2.5 mg  2.5 mg Nebulization Q4H PRN    metoprolol (LOPRESSOR) injection 5 mg  5 mg IntraVENous Q4H PRN       Social History:  Social History     Tobacco Use    Smoking status: Not on file   Substance Use Topics    Alcohol use: Not on file       Pt denies any history of drug use, blood transfusions, or tattoos. Family History:  No family history on file. Review of Systems:  A detailed 10 system ROS is obtained, with pertinent positives as listed above. All others are negative. Diet:      Objective:     Physical Exam:  Vitals:  Visit Vitals  BP (!) 150/98   Pulse (!) 130   Temp 98 °F (36.7 °C)   Resp 20   SpO2 98%     Gen:  Pt is alert, cooperative, no acute distress  Skin:  Extremities and face reveal no rashes. HEENT: Sclerae anicteric. Extra-occular muscles are intact. No oral ulcers. No abnormal pigmentation of the lips. The neck is supple. Cardiovascular: Regular rate and rhythm. No murmurs, gallops, or rubs. Respiratory:  Comfortable breathing with no accessory muscle use. Clear breath sounds anteriorly with no wheezes, rales, or rhonchi. GI:  Abdomen nondistended, soft, and tender in epigastric region. Normal active bowel sounds. No enlargement of the liver or spleen.  No masses palpable. Rectal:  Deferred  Musculoskeletal:  No pitting edema of the lower legs. Neurological:  Gross memory appears intact. Patient is alert and oriented. Psychiatric:  Mood appears appropriate with judgement intact. Lymphatic:  No cervical or supraclavicular adenopathy. Laboratory:    Recent Labs     01/14/20  1251 01/14/20  1223   WBC  --  9.7   HGB  --  17.9*   HCT  --  52.7*   PLT  --  145*   MCV  --  94.3   *  --    K 3.9  --    CL 88*  --    CO2 25  --    BUN 15  --    CREA 1.67*  --    CA 9.2  --    *  --    *  --    SGOT 229*  --    *  --    TBILI 3.3*  --    ALB 3.0*  --    TP 6.8  --    LPSE 2,180*  --    PTP 12.4  --    INR 0.9  --       FINDINGS: There is increased echotexture throughout the liver. There is no  intrahepatic biliary ductal dilatation. There is no focal hepatic mass.     The gallbladder is anechoic without evidence of cholelithiasis. There is no  gallbladder wall thickening or pericholecystic fluid to suggest acute  cholecystitis. The common bile duct is normal  measuring 4 mm.     The pancreas is unable to be visualized as it was obscured by overlying bowel  gas. The right kidney is unremarkable without hydronephrosis or solid mass. The  right kidney was measured at approximately 12.9 cm.     The visualized portions of the abdominal aorta and inferior vena cava are  unremarkable. There is normal hepatopetal flow within the main portal vein. No  free fluid is seen within the right upper quadrant.     IMPRESSION  IMPRESSION:    1. Hepatic steatosis. 2. Nonvisualization of the pancreas due to overlying bowel gas.  ---------------------------------------    FINDINGS:  -LUNG BASES: There is a tiny left pleural effusion. Lung bases are otherwise  clear.     -LIVER: Mild fatty infiltration. No discrete liver mass. -GALLBLADDER/BILE DUCTS: No gallstones or bile duct dilatation.   -PANCREAS: There is diffuse inflammation of the pancreas consistent with acute  pancreatitis. There is peripancreatic edema. There is no definite pseudocyst  or abscess. -SPLEEN: Normal.     -ADRENALS: Normal.  -KIDNEYS/URETERS: No hydronephrosis or significant mass. -BLADDER: Normal.  -REPRODUCTIVE ORGANS: No pelvic masses.     -BOWEL: Mild gastric and colon distention, likely ileus. -LYMPH NODES: No significant retroperitoneal, mesenteric, or pelvic adenopathy. -BONES: No fracture or significant bone lesion. -VASCULATURE: Normal  -OTHER: Mild ascites, most prominent in the pelvis.     IMPRESSION  IMPRESSION: Acute pancreatitis and mild ascites       Assessment:     Principal Problem:    Pancreatitis (1/14/2020)    Active Problems:    Chronic pain (1/14/2020)      Alcohol abuse (1/14/2020)      Hypertension (1/14/2020)      Depression (1/14/2020)      Tobacco use (1/14/2020)      ENOC (acute kidney injury) ([facility] [address] (1/14/2020)      Hyponatremia (1/14/2020)      Hyperglycemia (1/14/2020)      Patient is a 45 y.o. male with PMH of ETOH abuse, Tobacco abuse, Depression, chronic pain on Suboxone , who is seen in consultation at the request of Dr. Ksenia Quintero for acute pancreatitis with elevated LFT and lipase of 2180. Possible etiologies of acute pancreatitis can include ETOH (admits to daily ETOH of 6-8 beers), Gallstone pancreatitis (none seen on imaging but LFT are elevated), Medications, Hypertriglyceridemia, biliary obstruction (not seen on imaging). Plan:     1. Follow LFT  2. IVF hydration  3. NPO  4. Recommend ETOH Cessation  5. Await Viral Hep panel  6. Antiemetics and Pain control per primary  7. MRCP to further evaluate for possible Choledocholithiasis for possible Gallstone Pancreatitis. Thank you for this kind consultation. We will follow along with you. Benigno Reno NP    Patient is seen and examined in collaboration with Dr. Mejia Gomez. .  Assessment and plan as per Dr. Mejia Gomez.

## 2020-01-16 LAB
ALBUMIN SERPL-MCNC: 2.3 G/DL (ref 3.5–5)
ALBUMIN/GLOB SERPL: 0.7 {RATIO} (ref 1.2–3.5)
ALP SERPL-CCNC: 108 U/L (ref 50–136)
ALT SERPL-CCNC: 94 U/L (ref 12–65)
ANION GAP SERPL CALC-SCNC: 6 MMOL/L (ref 7–16)
APTT PPP: 29.7 SEC (ref 24.7–39.8)
AST SERPL-CCNC: 63 U/L (ref 15–37)
ATRIAL RATE: 122 BPM
BASOPHILS # BLD: 0 K/UL (ref 0–0.2)
BASOPHILS NFR BLD: 0 % (ref 0–2)
BILIRUB SERPL-MCNC: 2.6 MG/DL (ref 0.2–1.1)
BUN SERPL-MCNC: 17 MG/DL (ref 6–23)
CALCIUM SERPL-MCNC: 8.4 MG/DL (ref 8.3–10.4)
CALCULATED P AXIS, ECG09: 64 DEGREES
CALCULATED R AXIS, ECG10: 21 DEGREES
CALCULATED T AXIS, ECG11: 52 DEGREES
CHLORIDE SERPL-SCNC: 97 MMOL/L (ref 98–107)
CO2 SERPL-SCNC: 29 MMOL/L (ref 21–32)
CREAT SERPL-MCNC: 0.67 MG/DL (ref 0.8–1.5)
DIAGNOSIS, 93000: NORMAL
DIFFERENTIAL METHOD BLD: ABNORMAL
EOSINOPHIL # BLD: 0 K/UL (ref 0–0.8)
EOSINOPHIL NFR BLD: 0 % (ref 0.5–7.8)
ERYTHROCYTE [DISTWIDTH] IN BLOOD BY AUTOMATED COUNT: 13.4 % (ref 11.9–14.6)
GLOBULIN SER CALC-MCNC: 3.4 G/DL (ref 2.3–3.5)
GLUCOSE BLD STRIP.AUTO-MCNC: 106 MG/DL (ref 65–100)
GLUCOSE BLD STRIP.AUTO-MCNC: 109 MG/DL (ref 65–100)
GLUCOSE BLD STRIP.AUTO-MCNC: 96 MG/DL (ref 65–100)
GLUCOSE BLD STRIP.AUTO-MCNC: 96 MG/DL (ref 65–100)
GLUCOSE SERPL-MCNC: 110 MG/DL (ref 65–100)
HCT VFR BLD AUTO: 35 % (ref 41.1–50.3)
HGB BLD-MCNC: 12.1 G/DL (ref 13.6–17.2)
IMM GRANULOCYTES # BLD AUTO: 0.1 K/UL (ref 0–0.5)
IMM GRANULOCYTES NFR BLD AUTO: 1 % (ref 0–5)
INR PPP: 0.9
LACTATE SERPL-SCNC: 0.9 MMOL/L (ref 0.4–2)
LDH SERPL L TO P-CCNC: 596 U/L (ref 100–190)
LIPASE SERPL-CCNC: 380 U/L (ref 73–393)
LYMPHOCYTES # BLD: 1.1 K/UL (ref 0.5–4.6)
LYMPHOCYTES NFR BLD: 20 % (ref 13–44)
MCH RBC QN AUTO: 32.5 PG (ref 26.1–32.9)
MCHC RBC AUTO-ENTMCNC: 34.6 G/DL (ref 31.4–35)
MCV RBC AUTO: 94.1 FL (ref 79.6–97.8)
MONOCYTES # BLD: 0.7 K/UL (ref 0.1–1.3)
MONOCYTES NFR BLD: 13 % (ref 4–12)
NEUTS SEG # BLD: 3.6 K/UL (ref 1.7–8.2)
NEUTS SEG NFR BLD: 66 % (ref 43–78)
NRBC # BLD: 0 K/UL (ref 0–0.2)
P-R INTERVAL, ECG05: 156 MS
PLATELET # BLD AUTO: 64 K/UL (ref 150–450)
PLATELET COMMENTS,PCOM: ABNORMAL
PMV BLD AUTO: 11.6 FL (ref 9.4–12.3)
POTASSIUM SERPL-SCNC: 3.8 MMOL/L (ref 3.5–5.1)
PROT SERPL-MCNC: 5.7 G/DL (ref 6.3–8.2)
PROTHROMBIN TIME: 12.4 SEC (ref 11.7–14.5)
Q-T INTERVAL, ECG07: 320 MS
QRS DURATION, ECG06: 96 MS
QTC CALCULATION (BEZET), ECG08: 456 MS
RBC # BLD AUTO: 3.72 M/UL (ref 4.23–5.6)
RBC MORPH BLD: ABNORMAL
SODIUM SERPL-SCNC: 132 MMOL/L (ref 136–145)
VENTRICULAR RATE, ECG03: 122 BPM
WBC # BLD AUTO: 5.5 K/UL (ref 4.3–11.1)
WBC MORPH BLD: ABNORMAL

## 2020-01-16 PROCEDURE — 80074 ACUTE HEPATITIS PANEL: CPT

## 2020-01-16 PROCEDURE — 74011000258 HC RX REV CODE- 258: Performed by: NURSE PRACTITIONER

## 2020-01-16 PROCEDURE — 80053 COMPREHEN METABOLIC PANEL: CPT

## 2020-01-16 PROCEDURE — 36415 COLL VENOUS BLD VENIPUNCTURE: CPT

## 2020-01-16 PROCEDURE — 74011250637 HC RX REV CODE- 250/637: Performed by: HOSPITALIST

## 2020-01-16 PROCEDURE — 74011000250 HC RX REV CODE- 250: Performed by: INTERNAL MEDICINE

## 2020-01-16 PROCEDURE — 74011250637 HC RX REV CODE- 250/637: Performed by: NURSE PRACTITIONER

## 2020-01-16 PROCEDURE — 83690 ASSAY OF LIPASE: CPT

## 2020-01-16 PROCEDURE — 65660000000 HC RM CCU STEPDOWN

## 2020-01-16 PROCEDURE — 83605 ASSAY OF LACTIC ACID: CPT

## 2020-01-16 PROCEDURE — 87389 HIV-1 AG W/HIV-1&-2 AB AG IA: CPT

## 2020-01-16 PROCEDURE — 85025 COMPLETE CBC W/AUTO DIFF WBC: CPT

## 2020-01-16 PROCEDURE — 74011250636 HC RX REV CODE- 250/636: Performed by: NURSE PRACTITIONER

## 2020-01-16 PROCEDURE — 87086 URINE CULTURE/COLONY COUNT: CPT

## 2020-01-16 PROCEDURE — 93005 ELECTROCARDIOGRAM TRACING: CPT | Performed by: NURSE PRACTITIONER

## 2020-01-16 PROCEDURE — 74011000250 HC RX REV CODE- 250: Performed by: NURSE PRACTITIONER

## 2020-01-16 PROCEDURE — 85610 PROTHROMBIN TIME: CPT

## 2020-01-16 PROCEDURE — 93306 TTE W/DOPPLER COMPLETE: CPT

## 2020-01-16 PROCEDURE — 83615 LACTATE (LD) (LDH) ENZYME: CPT

## 2020-01-16 PROCEDURE — 74011250636 HC RX REV CODE- 250/636: Performed by: INTERNAL MEDICINE

## 2020-01-16 PROCEDURE — 74011250637 HC RX REV CODE- 250/637: Performed by: INTERNAL MEDICINE

## 2020-01-16 PROCEDURE — 77030020263 HC SOL INJ SOD CL0.9% LFCR 1000ML

## 2020-01-16 PROCEDURE — 74011000258 HC RX REV CODE- 258: Performed by: INTERNAL MEDICINE

## 2020-01-16 PROCEDURE — 85730 THROMBOPLASTIN TIME PARTIAL: CPT

## 2020-01-16 PROCEDURE — 82962 GLUCOSE BLOOD TEST: CPT

## 2020-01-16 RX ORDER — HYDRALAZINE HYDROCHLORIDE 20 MG/ML
10 INJECTION INTRAMUSCULAR; INTRAVENOUS ONCE
Status: DISCONTINUED | OUTPATIENT
Start: 2020-01-16 | End: 2020-01-16

## 2020-01-16 RX ORDER — LANOLIN ALCOHOL/MO/W.PET/CERES
100 CREAM (GRAM) TOPICAL DAILY
Status: DISCONTINUED | OUTPATIENT
Start: 2020-01-16 | End: 2020-01-22 | Stop reason: HOSPADM

## 2020-01-16 RX ORDER — SENNOSIDES 8.6 MG/1
2 TABLET ORAL 2 TIMES DAILY
Status: COMPLETED | OUTPATIENT
Start: 2020-01-16 | End: 2020-01-17

## 2020-01-16 RX ORDER — CLONIDINE HYDROCHLORIDE 0.2 MG/1
0.2 TABLET ORAL 2 TIMES DAILY
Status: DISCONTINUED | OUTPATIENT
Start: 2020-01-16 | End: 2020-01-17

## 2020-01-16 RX ORDER — FACIAL-BODY WIPES
10 EACH TOPICAL DAILY PRN
Status: DISCONTINUED | OUTPATIENT
Start: 2020-01-16 | End: 2020-01-22 | Stop reason: HOSPADM

## 2020-01-16 RX ORDER — LISINOPRIL 5 MG/1
5 TABLET ORAL DAILY
Status: DISCONTINUED | OUTPATIENT
Start: 2020-01-16 | End: 2020-01-16

## 2020-01-16 RX ORDER — METOPROLOL TARTRATE 5 MG/5ML
5 INJECTION INTRAVENOUS ONCE
Status: COMPLETED | OUTPATIENT
Start: 2020-01-16 | End: 2020-01-16

## 2020-01-16 RX ORDER — CHLORDIAZEPOXIDE HYDROCHLORIDE 10 MG/1
10 CAPSULE, GELATIN COATED ORAL 3 TIMES DAILY
Status: DISCONTINUED | OUTPATIENT
Start: 2020-01-16 | End: 2020-01-18

## 2020-01-16 RX ORDER — METOPROLOL TARTRATE 25 MG/1
25 TABLET, FILM COATED ORAL 2 TIMES DAILY
Status: DISCONTINUED | OUTPATIENT
Start: 2020-01-16 | End: 2020-01-16

## 2020-01-16 RX ADMIN — CHLORDIAZEPOXIDE HYDROCHLORIDE 10 MG: 10 CAPSULE ORAL at 21:16

## 2020-01-16 RX ADMIN — THIAMINE HYDROCHLORIDE: 100 INJECTION, SOLUTION INTRAMUSCULAR; INTRAVENOUS at 08:19

## 2020-01-16 RX ADMIN — CEFTRIAXONE 1 G: 1 INJECTION, POWDER, FOR SOLUTION INTRAMUSCULAR; INTRAVENOUS at 08:07

## 2020-01-16 RX ADMIN — Medication 100 MG: at 12:04

## 2020-01-16 RX ADMIN — VENLAFAXINE HYDROCHLORIDE 150 MG: 75 CAPSULE, EXTENDED RELEASE ORAL at 08:08

## 2020-01-16 RX ADMIN — LISINOPRIL 5 MG: 5 TABLET ORAL at 09:53

## 2020-01-16 RX ADMIN — Medication 10 ML: at 21:17

## 2020-01-16 RX ADMIN — METOPROLOL TARTRATE 5 MG: 5 INJECTION INTRAVENOUS at 11:12

## 2020-01-16 RX ADMIN — HYDROMORPHONE HYDROCHLORIDE 1 MG: 1 INJECTION, SOLUTION INTRAMUSCULAR; INTRAVENOUS; SUBCUTANEOUS at 16:27

## 2020-01-16 RX ADMIN — CHLORDIAZEPOXIDE HYDROCHLORIDE 10 MG: 10 CAPSULE ORAL at 12:04

## 2020-01-16 RX ADMIN — PIPERACILLIN AND TAZOBACTAM 4.5 G: 4; .5 INJECTION, POWDER, FOR SOLUTION INTRAVENOUS at 11:12

## 2020-01-16 RX ADMIN — DICYCLOMINE HYDROCHLORIDE 10 MG: 10 CAPSULE ORAL at 21:16

## 2020-01-16 RX ADMIN — METOPROLOL TARTRATE 5 MG: 5 INJECTION INTRAVENOUS at 08:07

## 2020-01-16 RX ADMIN — PIPERACILLIN AND TAZOBACTAM 4.5 G: 4; .5 INJECTION, POWDER, FOR SOLUTION INTRAVENOUS at 18:02

## 2020-01-16 RX ADMIN — METOPROLOL TARTRATE 5 MG: 5 INJECTION INTRAVENOUS at 01:55

## 2020-01-16 RX ADMIN — HYDROMORPHONE HYDROCHLORIDE 1 MG: 1 INJECTION, SOLUTION INTRAMUSCULAR; INTRAVENOUS; SUBCUTANEOUS at 05:05

## 2020-01-16 RX ADMIN — SENNOSIDES 17.2 MG: 8.6 TABLET, FILM COATED ORAL at 18:00

## 2020-01-16 RX ADMIN — METOPROLOL TARTRATE 25 MG: 25 TABLET ORAL at 09:53

## 2020-01-16 RX ADMIN — DICYCLOMINE HYDROCHLORIDE 10 MG: 10 CAPSULE ORAL at 08:08

## 2020-01-16 RX ADMIN — CLONIDINE HYDROCHLORIDE 0.2 MG: 0.2 TABLET ORAL at 18:00

## 2020-01-16 RX ADMIN — DICYCLOMINE HYDROCHLORIDE 10 MG: 10 CAPSULE ORAL at 12:04

## 2020-01-16 RX ADMIN — HYDRALAZINE HYDROCHLORIDE 10 MG: 20 INJECTION, SOLUTION INTRAMUSCULAR; INTRAVENOUS at 08:07

## 2020-01-16 RX ADMIN — DICYCLOMINE HYDROCHLORIDE 10 MG: 10 CAPSULE ORAL at 18:00

## 2020-01-16 RX ADMIN — SODIUM CHLORIDE 150 ML/HR: 900 INJECTION, SOLUTION INTRAVENOUS at 05:04

## 2020-01-16 RX ADMIN — SENNOSIDES 17.2 MG: 8.6 TABLET, FILM COATED ORAL at 12:04

## 2020-01-16 RX ADMIN — HYDROMORPHONE HYDROCHLORIDE 1 MG: 1 INJECTION, SOLUTION INTRAMUSCULAR; INTRAVENOUS; SUBCUTANEOUS at 21:16

## 2020-01-16 RX ADMIN — CHLORDIAZEPOXIDE HYDROCHLORIDE 10 MG: 10 CAPSULE ORAL at 16:17

## 2020-01-16 RX ADMIN — Medication 10 ML: at 14:00

## 2020-01-16 RX ADMIN — CLONIDINE HYDROCHLORIDE 0.2 MG: 0.2 TABLET ORAL at 12:04

## 2020-01-16 NOTE — PROGRESS NOTES
Spiritual Care Visit, follow up visit. Patient was having an ultrasound.  will visit at another time. Visit by Stephanie Nunez, Staff .  Sheila., Tamia.MAMTA., B.A.

## 2020-01-16 NOTE — PROGRESS NOTES
END OF SHIFT NOTE:    INTAKE/OUTPUT  01/15 0701 - 01/16 0700  In: 3787.3 [P.O.:350; I.V.:3437.3]  Out: 1050 [Urine:1050]  Voiding: YES  Catheter: NO            Flatus: Patient does have flatus present. Stool:  0 occurrences. Characteristics:       Emesis: 0 occurrences. Characteristics:        VITAL SIGNS  Patient Vitals for the past 12 hrs:   Temp Pulse Resp BP SpO2   01/16/20 1613 98.3 °F (36.8 °C) (!) 104 18 (!) 154/101 97 %   01/16/20 1124 98.9 °F (37.2 °C) (!) 110 18 (!) 135/96 100 %   01/16/20 0732 99.4 °F (37.4 °C) (!) 119 18 (!) 160/116 95 %       Pain Assessment  Pain Intensity 1: 0 (01/16/20 1727)  Pain Location 1: Abdomen  Pain Intervention(s) 1: Medication (see MAR)  Patient Stated Pain Goal: 0    Ambulating  Yes    Shift report given to oncoming nurse at the bedside.     Tory Barbosa RN

## 2020-01-16 NOTE — PROGRESS NOTES
[facility] Progress Note         Admit Date:  1/14/2020    Today's Date:  1/16/2020    CC:  Necrotizing pancreatitis    Subjective:     Patient remains tachycardic but improved, tmax 99.4 over last 24 hours. Patient feeling improved, tolerating clears with no n/v.  Still with some left sided abd pain, extreme bloating. Passing flatus but no stool. Medications:   Current Facility-Administered Medications   Medication Dose Route Frequency    piperacillin-tazobactam (ZOSYN) 4.5 g in 0.9% sodium chloride (MBP/ADV) 100 mL  4.5 g IntraVENous Q8H    cloNIDine HCL (CATAPRES) tablet 0.2 mg  0.2 mg Oral BID    dicyclomine (BENTYL) capsule 10 mg  10 mg Oral QID    sodium chloride (NS) flush 5-40 mL  5-40 mL IntraVENous Q8H    sodium chloride (NS) flush 5-40 mL  5-40 mL IntraVENous PRN    HYDROmorphone (PF) (DILAUDID) injection 1 mg  1 mg IntraVENous Q4H PRN    naloxone (NARCAN) injection 0.4 mg  0.4 mg IntraVENous PRN    ondansetron (ZOFRAN) injection 4 mg  4 mg IntraVENous Q4H PRN    0.9% sodium chloride infusion  150 mL/hr IntraVENous CONTINUOUS    LORazepam (ATIVAN) injection 1 mg  1 mg IntraVENous Q4H PRN    folic acid (FOLVITE) 1 mg, thiamine (B-1) 100 mg in 0.9% sodium chloride 50 mL ivpb   IntraVENous DAILY    nicotine (NICODERM CQ) 21 mg/24 hr patch 1 Patch  1 Patch TransDERmal Q24H    hydrALAZINE (APRESOLINE) 20 mg/mL injection 10 mg  10 mg IntraVENous Q6H PRN    venlafaxine-SR (EFFEXOR-XR) capsule 150 mg  150 mg Oral DAILY WITH BREAKFAST    albuterol (PROVENTIL VENTOLIN) nebulizer solution 2.5 mg  2.5 mg Nebulization Q4H PRN    metoprolol (LOPRESSOR) injection 5 mg  5 mg IntraVENous Q4H PRN       Review of Systems:  ROS was obtained, with pertinent positives as listed above. No chest pain or SOB.     Diet:  Low fat clear    Objective:   Vitals:  Visit Vitals  BP (!) 160/116   Pulse (!) 119   Temp 99.4 °F (37.4 °C)   Resp 18   Wt 109.2 kg (240 lb 12.8 oz)   SpO2 95%   BMI 31.77 kg/m²     Intake/Output:  No intake/output data recorded. 01/14 1901 - 01/16 0700  In: 5170.7 [P.O.:350; I.V.:4820.7]  Out: 1460 [Urine:1460]  Exam:  General appearance: alert, cooperative, no distress  Lungs: clear to auscultation bilaterally anteriorly  Heart: regular rate and rhythm  Abdomen: firm, distended with mild left sided tenderness. Bowel sounds active. Extremities: extremities normal, atraumatic, no cyanosis or edema  Neuro:  alert and oriented    Data Review (Labs):    Recent Labs     01/16/20  0526 01/15/20  0448 01/14/20  1251 01/14/20  1223   WBC 5.5 6.9  --  9.7   HGB 12.1* 15.0  --  17.9*   HCT 35.0* 43.1  --  52.7*   PLT 64* 70*  --  145*   MCV 94.1 91.5  --  94.3   * 132* 127*  --    K 3.8 4.2 3.9  --    CL 97* 98 88*  --    CO2 29 28 25  --    BUN 17 15 15  --    CREA 0.67* 0.92 1.67*  --    CA 8.4 8.2* 9.2  --    * 125* 149*  --     131 196*  --    SGOT 63* 97* 229*  --    ALT 94* 130* 239*  --    TBILI 2.6* 2.4* 3.3*  --    ALB 2.3* 2.2* 3.0*  --    TP 5.7* 5.3* 6.8  --    LPSE 380 731* 2,180*  --    PTP  --   --  12.4  --    INR  --   --  0.9  --      CT OF THE ABDOMEN AND PELVIS 1/14/2020   INDICATION:  Pancreatitis and abdominal pain   Multiple axial images were obtained through the abdomen and pelvis without IV  contrast.  Oral contrast was used for bowel opacification.  Radiation dose  reduction techniques were used for this study:  All CT scans performed at this  facility use one or all of the following: Automated exposure control, adjustment  of the mA and/or kVp according to patient's size, iterative reconstruction.   COMPARISON: None   FINDINGS:  -LUNG BASES: There is a tiny left pleural effusion.  Lung bases are otherwise  clear.   -LIVER: Mild fatty infiltration.  No discrete liver mass. -GALLBLADDER/BILE DUCTS: No gallstones or bile duct dilatation.   -PANCREAS: There is diffuse inflammation of the pancreas consistent with acute  pancreatitis. Bart Hernandes is peripancreatic edema. Idamae Hash is no definite pseudocyst  or abscess. -SPLEEN: Normal.   -ADRENALS: Normal.  -KIDNEYS/URETERS: No hydronephrosis or significant mass. -BLADDER: Normal.  -REPRODUCTIVE ORGANS: No pelvic masses.   -BOWEL: Mild gastric and colon distention, likely ileus. -LYMPH NODES: No significant retroperitoneal, mesenteric, or pelvic adenopathy. -BONES: No fracture or significant bone lesion. -VASCULATURE: Normal  -OTHER: Mild ascites, most prominent in the pelvis.   IMPRESSION  IMPRESSION: Acute pancreatitis and mild ascites    MRI abdomen without contrast, MRCP protocol 1/15/2020  Findings:  Persistent trace left pleural effusion.   The liver is unremarkable in contour without evidence of T2 hyperintense lesion. There is diffuse loss of signal of the liver parenchyma on out of phase  imaging, consistent with hepatic steatosis. Normal-appearing intrahepatic and  extrahepatic ducts. No definite filling defect is seen in the common bile duct  to suggest MR evidence of choledocholithiasis. Normal-appearing pancreatic  duct. Visualized hepatic venous and portal venous branches appear patent.   The gallbladder, spleen, adrenal glands, and kidneys are unremarkable. Again  visualized is mild inflammatory fat stranding surrounding the pancreas with  small volume free fluid and ascites, similar to appearance on recent CT,  consistent with acute pancreatitis. No definite evidence of walled off fluid  collection to suggest pseudocyst formation at this time. There does appear to  be relative T1 hypointensity of the pancreatic head neck and body on T1-weighted  imaging, this may indicate necrotizing pancreatitis, although not fully  evaluated here on this noncontrast MRI.   No evidence of significant lymphadenopathy. The aorta, IVC, portal veins, and  hepatic veins appear patent. The visualized small and large bowel appear  unremarkable.   No evidence of aggressive osseous lesion   IMPRESSION:  1. Persistent acute pancreatitis without MR evidence of choledocholithiasis. Findings suspicious for necrotizing pancreatitis involving the head neck and  body of the pancreas, although not fully evaluated on this noncontrast MRI. 2.  Other chronic findings as above. Assessment:     Principal Problem:    Pancreatitis (1/14/2020)    Active Problems:    Chronic pain (1/14/2020)      Alcohol abuse (1/14/2020)      Hypertension (1/14/2020)      Depression (1/14/2020)      Tobacco use (1/14/2020)      ENOC (acute kidney injury) ([address] (1/14/2020)      Hyponatremia (1/14/2020)      Hyperglycemia (1/14/2020)    45 y. o. male with hx of etoh abuse, tobacco abuse, depression, chronic pain on Suboxone,  is seen in consultation at the request of Dr. Enrique Cotter for acute pancreatitis with elevated LFT and lipase of 2180. Possible etiologies of acute pancreatitis can include etoh (admits to daily ETOH of 6-8 beers), gallstone pancreatitis (none seen on imaging but LFT are elevated), medications, hypertriglyceridemia, biliary obstruction (not seen on imaging).  CT with acute pancreatitis and mild ascites. MRCP with persistent acute pancreatitis with suspicion for necrotizing pancreatitis. Plan:     1. Tachycardia improved  2. Liver chemistry improving  3. New thrombocytopenia - follow  4. Continue supportive care with hydration  5. ETOH cessation encouraged    Patient is seen and examined in collaboration with Dr. Ramsey Manning. Assessment and plan as per Dr. Ramsey Manning. Dony Owen NP    ATTENDING NOTE:  I agree with the above assessment and plan. Patient with acute alcoholic necrotizing pancreatitis without clinical evidence of infected necrosis. No indication for antibiotic therapy. Tachycardia is most likely due to alcohol withdrawal. Okay to advance to full liquid, low fat diet as tolerated.      Palomo Alvarado MD

## 2020-01-16 NOTE — PROGRESS NOTES
Patient seen and examined by me. Pain is mildly improving  On exam: mild abd tenderness w/ hypoactive but existing bs, mildly distended  Lab ordered and reviewed. Dx:  1- Alcoholic necrotizing pancreatitis  2- Tacychardia and low grade fever dt #1 vs early sepsis vs alcohol withdrawal  3- Thrombocytopenia, plt down by 50%, related to #1/2. No heparin given during admission.     Rx:  Aggressive IVF  Pain control  Zosyn iv  PO diet ordered by Gi  Alcohol withdrawal preventive measures  Follow LDH and lactic acid  AM lab    Signed By: Favian Cerrato MD     January 16, 2020

## 2020-01-16 NOTE — PROGRESS NOTES
Hospitalist Progress Note    Subjective:   Daily Progress Note: 1/16/2020 10:13 AM    Patient presents to ER 1/14 with complaints of mid abdominal pain x 24 hours with diaphoresis. Denies nausea, vomiting, diarrhea, but denies any po intake after pain began. No history of pancreatitis, but does drink alcohol daily, also taking suboxone with last dose 1/13. Blood pressure on arrival is 170/117 with heart rate of 135. LFTs elevated, lipase 2180. Hyponatremia to 127. Creatinine 1.67. Found with right upper quad, epigastric, and left upper quad pain. Admitted on IVF, NPO, GI consult, ETOH  withdrawal protocols.   1/15:  Found patient with continued pain but feeling much better this am.  Advancing diet to clear liquids. Lipase down to 731 today. Discussed probable etiology for first episode pancreatitis is alcohol related, informed patient he would have recurrent bouts of pancreatitis if he continues to drink. Father at bedside. Heavy ETOH consumption for years, has been abstinent a few times without signs of DTs. Encouraged to report any new symptoms. Remains intermittently tachycardic. Does not seem to need librium at present. Lactic down to 1.7 this am.       1/16:  Run of hypertension and tachycardia to 150 through night. lopressor administered. Platelets down to 64 this am.  Continued bloating. Feeling about the same this am, no signs of withdrawal.  Remains tachycardic with low grade fever. Start zosyn. Father at bedside. Dr Kassie Talavera in to see patient also. GI on board. T max 99.4 x 24 hours. Tolerating clear liquids well. Increased IVF rate    ADDITIONAL HISTORY:  Alcoholism, suboxone use, polysubstance use, depression.   Current Facility-Administered Medications   Medication Dose Route Frequency    cefTRIAXone (ROCEPHIN) 1 g in 0.9% sodium chloride (MBP/ADV) 50 mL  1 g IntraVENous Q24H    metoprolol tartrate (LOPRESSOR) tablet 25 mg  25 mg Oral BID    lisinopril (PRINIVIL, ZESTRIL) tablet 5 mg  5 mg Oral DAILY    dicyclomine (BENTYL) capsule 10 mg  10 mg Oral QID    sodium chloride (NS) flush 5-40 mL  5-40 mL IntraVENous Q8H    sodium chloride (NS) flush 5-40 mL  5-40 mL IntraVENous PRN    HYDROmorphone (PF) (DILAUDID) injection 1 mg  1 mg IntraVENous Q4H PRN    naloxone (NARCAN) injection 0.4 mg  0.4 mg IntraVENous PRN    ondansetron (ZOFRAN) injection 4 mg  4 mg IntraVENous Q4H PRN    0.9% sodium chloride infusion  150 mL/hr IntraVENous CONTINUOUS    LORazepam (ATIVAN) injection 1 mg  1 mg IntraVENous R3M PRN    folic acid (FOLVITE) 1 mg, thiamine (B-1) 100 mg in 0.9% sodium chloride 50 mL ivpb   IntraVENous DAILY    nicotine (NICODERM CQ) 21 mg/24 hr patch 1 Patch  1 Patch TransDERmal Q24H    hydrALAZINE (APRESOLINE) 20 mg/mL injection 10 mg  10 mg IntraVENous Q6H PRN    venlafaxine-SR (EFFEXOR-XR) capsule 150 mg  150 mg Oral DAILY WITH BREAKFAST    albuterol (PROVENTIL VENTOLIN) nebulizer solution 2.5 mg  2.5 mg Nebulization Q4H PRN    metoprolol (LOPRESSOR) injection 5 mg  5 mg IntraVENous Q4H PRN        Review of Systems  A comprehensive review of systems was negative except for that written in the HPI. Objective:     Visit Vitals  BP (!) 160/116   Pulse (!) 119   Temp 99.4 °F (37.4 °C)   Resp 18   Wt 109.2 kg (240 lb 12.8 oz)   SpO2 95%   BMI 31.77 kg/m²      O2 Device: Room air    Temp (24hrs), Av.5 °F (36.9 °C), Min:97.3 °F (36.3 °C), Max:99.5 °F (37.5 °C)     1901 -  0700  In: 5170.7 [P.O.:350; I.V.:4820.7]  Out: 1460 [Urine:1460]    General appearance: Oriented and alert, cooperative,  Reports pain improved, but still present to entire upper abdomen. Well nourished, well hydrated. Father at bedside. Head: Normocephalic, without obvious abnormality, atraumatic  Eyes: conjunctivae/corneas clear. PERRL  Neck: supple, symmetrical, trachea midline, and no JVD  Lungs: clear to auscultation bilaterally  Heart:  Heart rate remains erratic, intermittently tachy. regular rate and rhythm, S1, S2 normal, no murmur, click, rub or gallop  Abdomen: Bloated, slightly tense, tenderness with palp all upper areas, none lower. Mild constipation. Bowel sounds normal. No masses,  no organomegaly  Extremities: All extremities normal, atraumatic, no cyanosis or edema  Skin: Skin color, texture, turgor normal. No rashes or lesions  Neuro:  No signs of tremor, disorientation etc.   Additional comments: Notes,orders, test results, vitals reviewed     Data Review  Recent Results (from the past 24 hour(s))   GLUCOSE, POC    Collection Time: 01/15/20 11:20 AM   Result Value Ref Range    Glucose (POC) 119 (H) 65 - 100 mg/dL   GLUCOSE, POC    Collection Time: 01/15/20  4:45 PM   Result Value Ref Range    Glucose (POC) 145 (H) 65 - 100 mg/dL   GLUCOSE, POC    Collection Time: 01/15/20  8:36 PM   Result Value Ref Range    Glucose (POC) 164 (H) 65 - 420 mg/dL   METABOLIC PANEL, COMPREHENSIVE    Collection Time: 01/16/20  5:26 AM   Result Value Ref Range    Sodium 132 (L) 136 - 145 mmol/L    Potassium 3.8 3.5 - 5.1 mmol/L    Chloride 97 (L) 98 - 107 mmol/L    CO2 29 21 - 32 mmol/L    Anion gap 6 (L) 7 - 16 mmol/L    Glucose 110 (H) 65 - 100 mg/dL    BUN 17 6 - 23 MG/DL    Creatinine 0.67 (L) 0.8 - 1.5 MG/DL    GFR est AA >60 >60 ml/min/1.73m2    GFR est non-AA >60 >60 ml/min/1.73m2    Calcium 8.4 8.3 - 10.4 MG/DL    Bilirubin, total 2.6 (H) 0.2 - 1.1 MG/DL    ALT (SGPT) 94 (H) 12 - 65 U/L    AST (SGOT) 63 (H) 15 - 37 U/L    Alk.  phosphatase 108 50 - 136 U/L    Protein, total 5.7 (L) 6.3 - 8.2 g/dL    Albumin 2.3 (L) 3.5 - 5.0 g/dL    Globulin 3.4 2.3 - 3.5 g/dL    A-G Ratio 0.7 (L) 1.2 - 3.5     CBC WITH AUTOMATED DIFF    Collection Time: 01/16/20  5:26 AM   Result Value Ref Range    WBC 5.5 4.3 - 11.1 K/uL    RBC 3.72 (L) 4.23 - 5.6 M/uL    HGB 12.1 (L) 13.6 - 17.2 g/dL    HCT 35.0 (L) 41.1 - 50.3 %    MCV 94.1 79.6 - 97.8 FL    MCH 32.5 26.1 - 32.9 PG    MCHC 34.6 31.4 - 35.0 g/dL    RDW 13.4 11.9 - 14.6 %    PLATELET 64 (L) 748 - 450 K/uL    MPV 11.6 9.4 - 12.3 FL    ABSOLUTE NRBC 0.00 0.0 - 0.2 K/uL    NEUTROPHILS 66 43 - 78 %    LYMPHOCYTES 20 13 - 44 %    MONOCYTES 13 (H) 4.0 - 12.0 %    EOSINOPHILS 0 (L) 0.5 - 7.8 %    BASOPHILS 0 0.0 - 2.0 %    IMMATURE GRANULOCYTES 1 0.0 - 5.0 %    ABS. NEUTROPHILS 3.6 1.7 - 8.2 K/UL    ABS. LYMPHOCYTES 1.1 0.5 - 4.6 K/UL    ABS. MONOCYTES 0.7 0.1 - 1.3 K/UL    ABS. EOSINOPHILS 0.0 0.0 - 0.8 K/UL    ABS. BASOPHILS 0.0 0.0 - 0.2 K/UL    ABS. IMM. GRANS. 0.1 0.0 - 0.5 K/UL    RBC COMMENTS MODERATE  TARGET CELLS        WBC COMMENTS Result Confirmed By Smear      PLATELET COMMENTS MARKED      DF AUTOMATED     LIPASE    Collection Time: 01/16/20  5:26 AM   Result Value Ref Range    Lipase 380 73 - 393 U/L   GLUCOSE, POC    Collection Time: 01/16/20  7:51 AM   Result Value Ref Range    Glucose (POC) 106 (H) 65 - 100 mg/dL      1/14:  ABDOMINAL ULTRASOUND: . Hepatic steatosis. Nonvisualization of the pancreas due to overlying bowel gas.     1/14:  CT ABDOMEN: Acute pancreatitis and mild ascites     1/15:  MRI ABDOMEN:  Persistent acute pancreatitis without MR evidence of choledocholithiasis. Findings suspicious for necrotizing pancreatitis involving the head neck and body of the pancreas, although not fully evaluated on this noncontrast MRI. Other chronic findings as above. Assessment/Plan:   Acute alcoholic pancreatitis, first episode:               Daily labs              Monitor pain and nausea              Diet increased to clear liquids today              Continue IVF              Bentyl ordered for cramping    GI on board                 Elevated LFTs              Hepatitis panel pending               INR, HIV testing      Polysubstance abuse:   Attends suboxone clinic, last dose 1/13              Follow up on discharge     Acute UTI:  Culture pending              Begin rocephin 1/16      Chronic pain on suboxone              Continue narcs here for pain, avoiding tylenol     Alcohol abuse: Monitor for withdrawls              Ativan prn              Will begin librium if needed      Uncontrolled Hypertension               PRN Hydralazine, may need chronic treatment      Depression:  Home effexor       Longstanding ongoing Tobacco use:               Patch prn               Ativan prn     ENOC:  Continue IVF:  NS at 150 ml/hr               Monitor      Intermittent tachycardia to 150:  EKG with ST:  No history of same. ? Etiology.  Possibly due to illness and substance withdrawal               Lopressor prn               May need to add BB      Hyponatremia: replace and recheck: Most likely due to volume depletion               Continue to hydrate      Hyperglycemia:  A1C: 5.8              Due to disease process               Continue to monitor      Care Plan discussed with: Patient, Father and Nurse    Signed By: Mikayla Cherry NP     January 16, 2020

## 2020-01-16 NOTE — PROGRESS NOTES
END OF SHIFT NOTE:    INTAKE/OUTPUT  01/14 0701 - 01/15 0700  In: 2383.4 [I.V.:2383.4]  Out: 410 [Urine:410]  Voiding: YES  Catheter: NO  Drain:      Flatus: Patient does have flatus present. Stool:  0 occurrences. Characteristics:       Emesis: 0 occurrences. Characteristics:        VITAL SIGNS  Patient Vitals for the past 12 hrs:   Temp Pulse Resp BP SpO2   01/15/20 1945 98.8 °F (37.1 °C) (!) 123 18 94/69 93 %   01/15/20 1516 97.3 °F (36.3 °C) (!) 116 18 (!) 158/100 94 %   01/15/20 1252  (!) 105  (!) 138/105    01/15/20 1123 98.3 °F (36.8 °C) (!) 125 18 (!) 137/102 94 %       Pain Assessment  Pain Intensity 1: 9 (01/15/20 1847)  Pain Location 1: Abdomen  Pain Intervention(s) 1: Medication (see MAR)  Patient Stated Pain Goal: 0    Ambulating  Yes. Patient had a large amount of flatulence this shift. Patient c/o upper,mid abdominal pain, dilaudid given per MAR. Shift report given to oncoming nurse at the bedside.     Avinash Trujillo RN

## 2020-01-16 NOTE — PROGRESS NOTES
Message sent via perfect serve. - Patient BP: 179/105, Pulse: 118. 2110: Hydralazine and Ativan given 2223: Patient BP:161/100 , Pulse: 130. 2230: will administer pain meds. Please call    Dr. Fernando Monterroso called back. Not overly concerned about patient VS. Normal for patient current condition  No new orders received at this time.

## 2020-01-16 NOTE — PROGRESS NOTES
0141: Received call from Ramesh Grover in the monitor room. Patient heart rate hanging around 150-155. 0150: Writer checked on patient, denies SOB, chest pains. Writer noticed bruising around IV site. Writer gave patient new IV site. 0155: Administered lopressor. 0225: Writer contacted monitor room: Patient is Sinus tach @ 118-120.

## 2020-01-16 NOTE — PROGRESS NOTES
Gave  Armanipardeep Ritter of room 205 information about his new PCP appointment:  1/23 at 9:15am (arrival time of 9:00am) with Dr. Ladan Simon at [facility].

## 2020-01-17 LAB
ALBUMIN SERPL-MCNC: 2.1 G/DL (ref 3.5–5)
ALBUMIN/GLOB SERPL: 0.6 {RATIO} (ref 1.2–3.5)
ALP SERPL-CCNC: 87 U/L (ref 50–136)
ALT SERPL-CCNC: 66 U/L (ref 12–65)
ANION GAP SERPL CALC-SCNC: 7 MMOL/L (ref 7–16)
AST SERPL-CCNC: 42 U/L (ref 15–37)
BASOPHILS # BLD: 0 K/UL (ref 0–0.2)
BASOPHILS NFR BLD: 0 % (ref 0–2)
BILIRUB SERPL-MCNC: 2.3 MG/DL (ref 0.2–1.1)
BUN SERPL-MCNC: 7 MG/DL (ref 6–23)
CALCIUM SERPL-MCNC: 8.2 MG/DL (ref 8.3–10.4)
CHLORIDE SERPL-SCNC: 95 MMOL/L (ref 98–107)
CO2 SERPL-SCNC: 28 MMOL/L (ref 21–32)
CREAT SERPL-MCNC: 0.54 MG/DL (ref 0.8–1.5)
DIFFERENTIAL METHOD BLD: ABNORMAL
EOSINOPHIL # BLD: 0 K/UL (ref 0–0.8)
EOSINOPHIL NFR BLD: 1 % (ref 0.5–7.8)
ERYTHROCYTE [DISTWIDTH] IN BLOOD BY AUTOMATED COUNT: 13.4 % (ref 11.9–14.6)
GLOBULIN SER CALC-MCNC: 3.3 G/DL (ref 2.3–3.5)
GLUCOSE BLD STRIP.AUTO-MCNC: 104 MG/DL (ref 65–100)
GLUCOSE BLD STRIP.AUTO-MCNC: 112 MG/DL (ref 65–100)
GLUCOSE BLD STRIP.AUTO-MCNC: 142 MG/DL (ref 65–100)
GLUCOSE BLD STRIP.AUTO-MCNC: 160 MG/DL (ref 65–100)
GLUCOSE SERPL-MCNC: 108 MG/DL (ref 65–100)
HAV IGM SERPL QL IA: NEGATIVE
HBV CORE IGM SERPL QL IA: NEGATIVE
HBV SURFACE AG SERPL QL IA: NEGATIVE
HCT VFR BLD AUTO: 33.3 % (ref 41.1–50.3)
HCV AB S/CO SERPL IA: <0.1 S/CO RATIO (ref 0–0.9)
HGB BLD-MCNC: 11.4 G/DL (ref 13.6–17.2)
HIV 1+2 AB+HIV1 P24 AG SERPL QL IA: NON REACTIVE
IMM GRANULOCYTES # BLD AUTO: 0.1 K/UL (ref 0–0.5)
IMM GRANULOCYTES NFR BLD AUTO: 3 % (ref 0–5)
LDH SERPL L TO P-CCNC: 475 U/L (ref 100–190)
LYMPHOCYTES # BLD: 0.9 K/UL (ref 0.5–4.6)
LYMPHOCYTES NFR BLD: 31 % (ref 13–44)
MAGNESIUM SERPL-MCNC: 1.8 MG/DL (ref 1.8–2.4)
MCH RBC QN AUTO: 32.3 PG (ref 26.1–32.9)
MCHC RBC AUTO-ENTMCNC: 34.2 G/DL (ref 31.4–35)
MCV RBC AUTO: 94.3 FL (ref 79.6–97.8)
MONOCYTES # BLD: 0.6 K/UL (ref 0.1–1.3)
MONOCYTES NFR BLD: 21 % (ref 4–12)
NEUTS SEG # BLD: 1.2 K/UL (ref 1.7–8.2)
NEUTS SEG NFR BLD: 44 % (ref 43–78)
NRBC # BLD: 0 K/UL (ref 0–0.2)
PLATELET # BLD AUTO: 66 K/UL (ref 150–450)
PMV BLD AUTO: 10.8 FL (ref 9.4–12.3)
POTASSIUM SERPL-SCNC: 3.3 MMOL/L (ref 3.5–5.1)
PROT SERPL-MCNC: 5.4 G/DL (ref 6.3–8.2)
RBC # BLD AUTO: 3.53 M/UL (ref 4.23–5.6)
SODIUM SERPL-SCNC: 130 MMOL/L (ref 136–145)
WBC # BLD AUTO: 2.8 K/UL (ref 4.3–11.1)

## 2020-01-17 PROCEDURE — 83615 LACTATE (LD) (LDH) ENZYME: CPT

## 2020-01-17 PROCEDURE — 74011000250 HC RX REV CODE- 250: Performed by: INTERNAL MEDICINE

## 2020-01-17 PROCEDURE — 74011250636 HC RX REV CODE- 250/636: Performed by: HOSPITALIST

## 2020-01-17 PROCEDURE — 74011250636 HC RX REV CODE- 250/636: Performed by: NURSE PRACTITIONER

## 2020-01-17 PROCEDURE — 80053 COMPREHEN METABOLIC PANEL: CPT

## 2020-01-17 PROCEDURE — 74011250637 HC RX REV CODE- 250/637: Performed by: HOSPITALIST

## 2020-01-17 PROCEDURE — 85025 COMPLETE CBC W/AUTO DIFF WBC: CPT

## 2020-01-17 PROCEDURE — 74011250637 HC RX REV CODE- 250/637: Performed by: INTERNAL MEDICINE

## 2020-01-17 PROCEDURE — 83735 ASSAY OF MAGNESIUM: CPT

## 2020-01-17 PROCEDURE — 65660000000 HC RM CCU STEPDOWN

## 2020-01-17 PROCEDURE — 74011250637 HC RX REV CODE- 250/637: Performed by: NURSE PRACTITIONER

## 2020-01-17 PROCEDURE — 74011000258 HC RX REV CODE- 258: Performed by: NURSE PRACTITIONER

## 2020-01-17 PROCEDURE — 74011250636 HC RX REV CODE- 250/636: Performed by: INTERNAL MEDICINE

## 2020-01-17 PROCEDURE — 77030020263 HC SOL INJ SOD CL0.9% LFCR 1000ML

## 2020-01-17 PROCEDURE — 82962 GLUCOSE BLOOD TEST: CPT

## 2020-01-17 PROCEDURE — 36415 COLL VENOUS BLD VENIPUNCTURE: CPT

## 2020-01-17 RX ORDER — METOPROLOL TARTRATE 25 MG/1
25 TABLET, FILM COATED ORAL EVERY 12 HOURS
Status: DISCONTINUED | OUTPATIENT
Start: 2020-01-17 | End: 2020-01-17

## 2020-01-17 RX ORDER — POTASSIUM CHLORIDE 20 MEQ/1
40 TABLET, EXTENDED RELEASE ORAL 2 TIMES DAILY
Status: COMPLETED | OUTPATIENT
Start: 2020-01-17 | End: 2020-01-17

## 2020-01-17 RX ADMIN — Medication 10 ML: at 05:55

## 2020-01-17 RX ADMIN — POTASSIUM CHLORIDE 40 MEQ: 20 TABLET, EXTENDED RELEASE ORAL at 17:56

## 2020-01-17 RX ADMIN — CLONIDINE HYDROCHLORIDE 0.3 MG: 0.1 TABLET ORAL at 16:18

## 2020-01-17 RX ADMIN — CHLORDIAZEPOXIDE HYDROCHLORIDE 10 MG: 10 CAPSULE ORAL at 08:22

## 2020-01-17 RX ADMIN — Medication 100 MG: at 08:22

## 2020-01-17 RX ADMIN — DICYCLOMINE HYDROCHLORIDE 10 MG: 10 CAPSULE ORAL at 21:53

## 2020-01-17 RX ADMIN — HYDROMORPHONE HYDROCHLORIDE 1 MG: 1 INJECTION, SOLUTION INTRAMUSCULAR; INTRAVENOUS; SUBCUTANEOUS at 20:20

## 2020-01-17 RX ADMIN — CHLORDIAZEPOXIDE HYDROCHLORIDE 10 MG: 10 CAPSULE ORAL at 21:53

## 2020-01-17 RX ADMIN — PIPERACILLIN AND TAZOBACTAM 4.5 G: 4; .5 INJECTION, POWDER, FOR SOLUTION INTRAVENOUS at 10:43

## 2020-01-17 RX ADMIN — DICYCLOMINE HYDROCHLORIDE 10 MG: 10 CAPSULE ORAL at 17:56

## 2020-01-17 RX ADMIN — HYDROMORPHONE HYDROCHLORIDE 1 MG: 1 INJECTION, SOLUTION INTRAMUSCULAR; INTRAVENOUS; SUBCUTANEOUS at 14:49

## 2020-01-17 RX ADMIN — LORAZEPAM 1 MG: 2 INJECTION INTRAMUSCULAR; INTRAVENOUS at 21:52

## 2020-01-17 RX ADMIN — HYDRALAZINE HYDROCHLORIDE 10 MG: 20 INJECTION, SOLUTION INTRAMUSCULAR; INTRAVENOUS at 04:33

## 2020-01-17 RX ADMIN — SENNOSIDES 17.2 MG: 8.6 TABLET, FILM COATED ORAL at 08:22

## 2020-01-17 RX ADMIN — METOPROLOL TARTRATE 5 MG: 5 INJECTION INTRAVENOUS at 23:53

## 2020-01-17 RX ADMIN — SODIUM CHLORIDE 125 ML/HR: 900 INJECTION, SOLUTION INTRAVENOUS at 05:54

## 2020-01-17 RX ADMIN — SENNOSIDES 17.2 MG: 8.6 TABLET, FILM COATED ORAL at 17:57

## 2020-01-17 RX ADMIN — Medication 10 ML: at 21:55

## 2020-01-17 RX ADMIN — CLONIDINE HYDROCHLORIDE 0.3 MG: 0.1 TABLET ORAL at 21:52

## 2020-01-17 RX ADMIN — CHLORDIAZEPOXIDE HYDROCHLORIDE 10 MG: 10 CAPSULE ORAL at 16:18

## 2020-01-17 RX ADMIN — CLONIDINE HYDROCHLORIDE 0.3 MG: 0.2 TABLET ORAL at 08:22

## 2020-01-17 RX ADMIN — PIPERACILLIN AND TAZOBACTAM 4.5 G: 4; .5 INJECTION, POWDER, FOR SOLUTION INTRAVENOUS at 03:30

## 2020-01-17 RX ADMIN — METOPROLOL TARTRATE 25 MG: 25 TABLET ORAL at 10:43

## 2020-01-17 RX ADMIN — PIPERACILLIN AND TAZOBACTAM 4.5 G: 4; .5 INJECTION, POWDER, FOR SOLUTION INTRAVENOUS at 18:32

## 2020-01-17 RX ADMIN — HYDROMORPHONE HYDROCHLORIDE 1 MG: 1 INJECTION, SOLUTION INTRAMUSCULAR; INTRAVENOUS; SUBCUTANEOUS at 10:51

## 2020-01-17 RX ADMIN — POTASSIUM CHLORIDE 40 MEQ: 20 TABLET, EXTENDED RELEASE ORAL at 08:38

## 2020-01-17 RX ADMIN — DICYCLOMINE HYDROCHLORIDE 10 MG: 10 CAPSULE ORAL at 08:22

## 2020-01-17 RX ADMIN — DICYCLOMINE HYDROCHLORIDE 10 MG: 10 CAPSULE ORAL at 14:49

## 2020-01-17 RX ADMIN — Medication 10 ML: at 14:00

## 2020-01-17 RX ADMIN — LORAZEPAM 1 MG: 2 INJECTION INTRAMUSCULAR; INTRAVENOUS at 05:54

## 2020-01-17 RX ADMIN — VENLAFAXINE HYDROCHLORIDE 150 MG: 75 CAPSULE, EXTENDED RELEASE ORAL at 08:22

## 2020-01-17 NOTE — PROGRESS NOTES
Hospitalist Progress Note    Subjective:   Daily Progress Note: 1/17/2020 10:28 AM    Patient presents to ER 1/14 with complaints of mid abdominal pain x 24 hours with diaphoresis.  Denies nausea, vomiting, diarrhea, but denies any po intake after pain began.  No history of pancreatitis, but does drink alcohol daily, also taking suboxone with last dose 1/13.  Blood pressure on arrival is 170/117 with heart rate of 135.  LFTs elevated, lipase 2180.  Hyponatremia to 127.  Creatinine 1.67. Found with right upper quad, epigastric, and left upper quad pain. Admitted on IVF, NPO, GI consult, ETOH  withdrawal protocols.   1/15:  Found patient with continued pain but feeling much better this am.  Advancing diet to clear liquids.  Lipase down to 731 today, plts: 70.  Discussed probable etiology for first episode pancreatitis is alcohol related, informed patient he would have recurrent bouts of pancreatitis if he continues to drink.  Father at bedside. [place] ETOH consumption for years, has been abstinent a few times without signs of DTs.  Encouraged to report any new symptoms. Remains intermittently tachycardic.  Does not seem to need librium at present.  Lactic down to 1.7 this am.     1/16:  Run of hypertension and tachycardia to 150 through night. lopressor administered. Platelets down to 64 this am, lipase 380. LDH: 596  Continued bloating. Feeling about the same this am, no signs of withdrawal.  Remains tachycardic with low grade fever. Start zosyn. Father at bedside. Dr James Dixon in to see patient also. GI on board. T max 99.4 x 24 hours. Tolerating full liquids well. Increased IVF rate    1/17: Afebrile. Tolerating full liquids with minimal nausea. Remains tachycardic, hypertension slowly improving but still elevated. Platelets 66 today. Still having pain. Father remains at bedside.  Lactic acid: 0.9, LDH: 49.  Still appears ill.       ADDITIONAL HISTORY:  Alcoholism, suboxone use, polysubstance use, depression. Current Facility-Administered Medications   Medication Dose Route Frequency    potassium chloride (K-DUR, KLOR-CON) SR tablet 40 mEq  40 mEq Oral BID    cloNIDine HCL (CATAPRES) tablet 0.3 mg  0.3 mg Oral BID    piperacillin-tazobactam (ZOSYN) 4.5 g in 0.9% sodium chloride (MBP/ADV) 100 mL  4.5 g IntraVENous Q8H    chlordiazePOXIDE (LIBRIUM) capsule 10 mg  10 mg Oral TID    thiamine HCL (B-1) tablet 100 mg  100 mg Oral DAILY    senna (SENOKOT) tablet 17.2 mg  2 Tab Oral BID    bisacodyL (DULCOLAX) suppository 10 mg  10 mg Rectal DAILY PRN    dicyclomine (BENTYL) capsule 10 mg  10 mg Oral QID    sodium chloride (NS) flush 5-40 mL  5-40 mL IntraVENous Q8H    sodium chloride (NS) flush 5-40 mL  5-40 mL IntraVENous PRN    HYDROmorphone (PF) (DILAUDID) injection 1 mg  1 mg IntraVENous Q4H PRN    naloxone (NARCAN) injection 0.4 mg  0.4 mg IntraVENous PRN    ondansetron (ZOFRAN) injection 4 mg  4 mg IntraVENous Q4H PRN    0.9% sodium chloride infusion  125 mL/hr IntraVENous CONTINUOUS    LORazepam (ATIVAN) injection 1 mg  1 mg IntraVENous Q4H PRN    nicotine (NICODERM CQ) 21 mg/24 hr patch 1 Patch  1 Patch TransDERmal Q24H    hydrALAZINE (APRESOLINE) 20 mg/mL injection 10 mg  10 mg IntraVENous Q6H PRN    venlafaxine-SR (EFFEXOR-XR) capsule 150 mg  150 mg Oral DAILY WITH BREAKFAST    albuterol (PROVENTIL VENTOLIN) nebulizer solution 2.5 mg  2.5 mg Nebulization Q4H PRN    metoprolol (LOPRESSOR) injection 5 mg  5 mg IntraVENous Q4H PRN        Review of Systems  A comprehensive review of systems was negative except for that written in the HPI. Objective:     Visit Vitals  BP (!) 164/102   Pulse (!) 123   Temp 98.5 °F (36.9 °C)   Resp 20   Wt 109.3 kg (241 lb)   SpO2 94%   BMI 31.80 kg/m²      O2 Device: Room air    Temp (24hrs), Av.6 °F (37 °C), Min:98.3 °F (36.8 °C), Max:98.9 °F (37.2 °C)    01/15 1901 -  0700  In: 0999 [P.O.:1350;  I.V.:3492]  Out: Marco [NEPAC:[id number]]    General appearance: Oriented and alert, cooperative,  Reports pain improved, but still present to entire upper abdomen. Well nourished, well hydrated. Appears ill. Father at bedside.    Head: Normocephalic, without obvious abnormality, atraumatic  Eyes: conjunctivae/corneas clear. PERRL  Neck: supple, symmetrical, trachea midline, and no JVD  Lungs: clear to auscultation bilaterally  Heart:  Heart rate remains erratic, intermittently tachy.  regular rate and rhythm, S1, S2 normal, no murmur, click, rub or gallop  Abdomen: Bloated, slightly tense, tenderness with palp all upper areas, none lower.  Mild constipation.  Bowel sounds normal. No masses,  no organomegaly  Extremities: All extremities normal, atraumatic, no cyanosis or edema  Skin: Skin color, texture, turgor normal. No rashes or lesions  Neuro:  No signs of tremor, disorientation etc.     Additional comments: Notes,orders, test results, vitals reviewed    Data Review  Recent Results (from the past 24 hour(s))   LACTIC ACID    Collection Time: 01/16/20 10:57 AM   Result Value Ref Range    Lactic acid 0.9 0.4 - 2.0 MMOL/L   EKG, 12 LEAD, SUBSEQUENT    Collection Time: 01/16/20 10:59 AM   Result Value Ref Range    Ventricular Rate 122 BPM    Atrial Rate 122 BPM    P-R Interval 156 ms    QRS Duration 96 ms    Q-T Interval 320 ms    QTC Calculation (Bezet) 456 ms    Calculated P Axis 64 degrees    Calculated R Axis 21 degrees    Calculated T Axis 52 degrees    Diagnosis       Sinus tachycardia  Otherwise normal ECG  When compared with ECG of 14-JAN-2020 12:31,  Borderline criteria for Anterior infarct are no longer Present  Confirmed by Donna Dyer MD (), CLARISSE PLASCENCIA ([id number]) on 1/16/2020 12:09:45 PM     HEPATITIS PANEL, ACUTE    Collection Time: 01/16/20 11:09 AM   Result Value Ref Range    Hepatitis A Ab, IgM NEGATIVE  NEGATIVE      Hep B surface Ag screen NEGATIVE  NEGATIVE      Hep B Core Ab, IgM NEGATIVE  NEGATIVE      Hep C Virus Ab <0.1 0.0 - 0.9 s/co ratio PROTHROMBIN TIME + INR    Collection Time: 01/16/20 11:15 AM   Result Value Ref Range    Prothrombin time 12.4 11.7 - 14.5 sec    INR 0.9     PTT    Collection Time: 01/16/20 11:15 AM   Result Value Ref Range    aPTT 29.7 24.7 - 39.8 SEC   LD    Collection Time: 01/16/20 11:15 AM   Result Value Ref Range     (H) 100 - 190 U/L   HIV 1/2 AG/AB, 4TH GENERATION,W RFLX CONFIRM    Collection Time: 01/16/20 11:15 AM   Result Value Ref Range    HIV Screen, 4th gen Non Reactive Non Reactive   GLUCOSE, POC    Collection Time: 01/16/20 12:11 PM   Result Value Ref Range    Glucose (POC) 96 65 - 100 mg/dL   CULTURE, URINE    Collection Time: 01/16/20  1:00 PM   Result Value Ref Range    Special Requests: NO SPECIAL REQUESTS      Culture result: NO GROWTH 1 DAY     GLUCOSE, POC    Collection Time: 01/16/20  4:50 PM   Result Value Ref Range    Glucose (POC) 96 65 - 100 mg/dL   GLUCOSE, POC    Collection Time: 01/16/20  9:06 PM   Result Value Ref Range    Glucose (POC) 109 (H) 65 - 081 mg/dL   METABOLIC PANEL, COMPREHENSIVE    Collection Time: 01/17/20  5:15 AM   Result Value Ref Range    Sodium 130 (L) 136 - 145 mmol/L    Potassium 3.3 (L) 3.5 - 5.1 mmol/L    Chloride 95 (L) 98 - 107 mmol/L    CO2 28 21 - 32 mmol/L    Anion gap 7 7 - 16 mmol/L    Glucose 108 (H) 65 - 100 mg/dL    BUN 7 6 - 23 MG/DL    Creatinine 0.54 (L) 0.8 - 1.5 MG/DL    GFR est AA >60 >60 ml/min/1.73m2    GFR est non-AA >60 >60 ml/min/1.73m2    Calcium 8.2 (L) 8.3 - 10.4 MG/DL    Bilirubin, total 2.3 (H) 0.2 - 1.1 MG/DL    ALT (SGPT) 66 (H) 12 - 65 U/L    AST (SGOT) 42 (H) 15 - 37 U/L    Alk.  phosphatase 87 50 - 136 U/L    Protein, total 5.4 (L) 6.3 - 8.2 g/dL    Albumin 2.1 (L) 3.5 - 5.0 g/dL    Globulin 3.3 2.3 - 3.5 g/dL    A-G Ratio 0.6 (L) 1.2 - 3.5     CBC WITH AUTOMATED DIFF    Collection Time: 01/17/20  5:15 AM   Result Value Ref Range    WBC 2.8 (L) 4.3 - 11.1 K/uL    RBC 3.53 (L) 4.23 - 5.6 M/uL    HGB 11.4 (L) 13.6 - 17.2 g/dL    HCT 33.3 (L) 41.1 - 50.3 %    MCV 94.3 79.6 - 97.8 FL    MCH 32.3 26.1 - 32.9 PG    MCHC 34.2 31.4 - 35.0 g/dL    RDW 13.4 11.9 - 14.6 %    PLATELET 66 (L) 649 - 450 K/uL    MPV 10.8 9.4 - 12.3 FL    ABSOLUTE NRBC 0.00 0.0 - 0.2 K/uL    NEUTROPHILS 44 43 - 78 %    LYMPHOCYTES 31 13 - 44 %    MONOCYTES 21 (H) 4.0 - 12.0 %    EOSINOPHILS 1 0.5 - 7.8 %    BASOPHILS 0 0.0 - 2.0 %    IMMATURE GRANULOCYTES 3 0.0 - 5.0 %    ABS. NEUTROPHILS 1.2 (L) 1.7 - 8.2 K/UL    ABS. LYMPHOCYTES 0.9 0.5 - 4.6 K/UL    ABS. MONOCYTES 0.6 0.1 - 1.3 K/UL    ABS. EOSINOPHILS 0.0 0.0 - 0.8 K/UL    ABS. BASOPHILS 0.0 0.0 - 0.2 K/UL    ABS. IMM. GRANS. 0.1 0.0 - 0.5 K/UL    DF AUTOMATED     MAGNESIUM    Collection Time: 01/17/20  5:15 AM   Result Value Ref Range    Magnesium 1.8 1.8 - 2.4 mg/dL   GLUCOSE, POC    Collection Time: 01/17/20  8:18 AM   Result Value Ref Range    Glucose (POC) 112 (H) 65 - 100 mg/dL     1/14:  ABDOMINAL ULTRASOUND: . Hepatic steatosis. Nonvisualization of the pancreas due to overlying bowel gas.     1/14:  CT ABDOMEN: Acute pancreatitis and mild ascites     1/15:  MRI ABDOMEN:  Persistent acute pancreatitis without MR evidence of choledocholithiasis.  Findings suspicious for necrotizing pancreatitis involving the head neck and body of the pancreas, although not fully evaluated on this noncontrast MRI.  Other chronic findings as above.      1/16:  ECHOCARDIOGRAM:    -  Left ventricle: Systolic function was normal. Ejection fraction was  estimated in the range of 55 % to 60 %. There were no regional wall motion  abnormalities. Wall thickness was mildly increased.     Assessment/Plan:   Acute alcoholic pancreatitis, first episode:               Daily labs, LDH, lactic acid              Monitor pain and nausea              Diet increased to clear liquids today              Continue IVF              Bentyl ordered for cramping               GI on board      Elevated LFTs:  Slowly improving               Hepatitis panel pending             INR, HIV testing      Polysubstance abuse:  Attends suboxone clinic, last dose 1/13              Follow up on discharge     Acute UTI:  Culture pending              Begin rocephin 1/16      Chronic pain on suboxone              Continue narcs here for pain, avoiding tylenol     Alcohol abuse: Monitor for withdrawls              Ativan prn              Will begin librium if needed      Uncontrolled Hypertension               PRN Hydralazine, may need chronic  treatment    Increasing clonidine to 0.3 mg tid      Depression:  Home effexor       Longstanding ongoing Tobacco use:               Patch prn               Ativan prn     ENOC:  Continue IVF:  NS at 150 ml/hr               Monitor      Intermittent tachycardia to 150:  EKG with ST:  No history of same.  ? Etiology.  Possibly due to illness and substance withdrawal               Lopressor prn                   Hyponatremia: replace and recheck: Most likely due to volume depletion               Continue to hydrate      Hyperglycemia:  A1C: 5.8              Due to disease process               Continue to monitor      Thrombocytopenia:  ? Splenic sequestration   Has not been on any type of [address]    Hypokalemia:  Replace and recheck    Care Plan discussed with: Patient, Father and Nurse    Signed By: Mikayla Cherry NP     January 17, 2020

## 2020-01-17 NOTE — PROGRESS NOTES
[facility] Progress Note         Admit Date:  1/14/2020    Today's Date:  1/17/2020    CC:  Elevated liver chemistry    Subjective:     Patient afebrile but remains tachycardic. Tolerating full liquid diet. Wet cloth to forehead - hot but no chills. Passing flatus and small stool yesterday. Family at bedside. Medications:   Current Facility-Administered Medications   Medication Dose Route Frequency    potassium chloride (K-DUR, KLOR-CON) SR tablet 40 mEq  40 mEq Oral BID    cloNIDine HCL (CATAPRES) tablet 0.3 mg  0.3 mg Oral BID    piperacillin-tazobactam (ZOSYN) 4.5 g in 0.9% sodium chloride (MBP/ADV) 100 mL  4.5 g IntraVENous Q8H    chlordiazePOXIDE (LIBRIUM) capsule 10 mg  10 mg Oral TID    thiamine HCL (B-1) tablet 100 mg  100 mg Oral DAILY    senna (SENOKOT) tablet 17.2 mg  2 Tab Oral BID    bisacodyL (DULCOLAX) suppository 10 mg  10 mg Rectal DAILY PRN    dicyclomine (BENTYL) capsule 10 mg  10 mg Oral QID    sodium chloride (NS) flush 5-40 mL  5-40 mL IntraVENous Q8H    sodium chloride (NS) flush 5-40 mL  5-40 mL IntraVENous PRN    HYDROmorphone (PF) (DILAUDID) injection 1 mg  1 mg IntraVENous Q4H PRN    naloxone (NARCAN) injection 0.4 mg  0.4 mg IntraVENous PRN    ondansetron (ZOFRAN) injection 4 mg  4 mg IntraVENous Q4H PRN    0.9% sodium chloride infusion  125 mL/hr IntraVENous CONTINUOUS    LORazepam (ATIVAN) injection 1 mg  1 mg IntraVENous Q4H PRN    nicotine (NICODERM CQ) 21 mg/24 hr patch 1 Patch  1 Patch TransDERmal Q24H    hydrALAZINE (APRESOLINE) 20 mg/mL injection 10 mg  10 mg IntraVENous Q6H PRN    venlafaxine-SR (EFFEXOR-XR) capsule 150 mg  150 mg Oral DAILY WITH BREAKFAST    albuterol (PROVENTIL VENTOLIN) nebulizer solution 2.5 mg  2.5 mg Nebulization Q4H PRN    metoprolol (LOPRESSOR) injection 5 mg  5 mg IntraVENous Q4H PRN       Review of Systems:  ROS was obtained, with pertinent positives as listed above. No chest pain or SOB.     Diet: Full liquid    Objective:   Vitals:  Visit Vitals  BP (!) 180/98 (BP 1 Location: Left arm, BP Patient Position: At rest)   Pulse (!) 124 Comment: monitor room   Temp 98.3 °F (36.8 °C)   Resp 20   Wt 109.3 kg (241 lb)   SpO2 94%   BMI 31.80 kg/m²     Intake/Output:  No intake/output data recorded. 01/15 1901 - 01/17 0700  In: 4842 [P.O.:1350; I.V.:3492]  Out: 1550 [CCTAO:5350]  Exam:  General appearance: alert, cooperative, no distress  Lungs: clear to auscultation bilaterally anteriorly  Heart: regular rate and rhythm  Abdomen: firm, distended, typmanic. Mild left sided tenderness. Bowel sounds hypoactive. No masses, no organomegaly  Extremities: extremities normal, atraumatic, no cyanosis or edema  Neuro:  alert and oriented    Data Review (Labs):    Recent Labs     01/17/20  0515 01/16/20  1115 01/16/20  0526 01/15/20  0448 01/14/20  1251 01/14/20  1223   WBC 2.8*  --  5.5 6.9  --  9.7   HGB 11.4*  --  12.1* 15.0  --  17.9*   HCT 33.3*  --  35.0* 43.1  --  52.7*   PLT 66*  --  64* 70*  --  145*   MCV 94.3  --  94.1 91.5  --  94.3   *  --  132* 132* 127*  --    K 3.3*  --  3.8 4.2 3.9  --    CL 95*  --  97* 98 88*  --    CO2 28  --  29 28 25  --    BUN 7  --  17 15 15  --    CREA 0.54*  --  0.67* 0.92 1.67*  --    CA 8.2*  --  8.4 8.2* 9.2  --    *  --  110* 125* 149*  --    AP 87  --  108 131 196*  --    SGOT 42*  --  63* 97* 229*  --    ALT 66*  --  94* 130* 239*  --    TBILI 2.3*  --  2.6* 2.4* 3.3*  --    ALB 2.1*  --  2.3* 2.2* 3.0*  --    TP 5.4*  --  5.7* 5.3* 6.8  --    LPSE  --   --  380 731* 2,180*  --    PTP  --  12.4  --   --  12.4  --    INR  --  0.9  --   --  0.9  --    APTT  --  29.7  --   --   --   --      CT OF THE ABDOMEN AND PELVIS 1/14/2020   INDICATION:  Pancreatitis and abdominal pain   Multiple axial images were obtained through the abdomen and pelvis without IV  contrast.  Oral contrast was used for bowel opacification.   Radiation dose  reduction techniques were used for this study: All CT scans performed at this  facility use one or all of the following: Automated exposure control, adjustment  of the mA and/or kVp according to patient's size, iterative reconstruction.   COMPARISON: None   FINDINGS:  -LUNG BASES: There is a tiny left pleural effusion. Lung bases are otherwise  clear.   -LIVER: Mild fatty infiltration. No discrete liver mass. -GALLBLADDER/BILE DUCTS: No gallstones or bile duct dilatation. -PANCREAS: There is diffuse inflammation of the pancreas consistent with acute  pancreatitis. There is peripancreatic edema. There is no definite pseudocyst  or abscess. -SPLEEN: Normal.   -ADRENALS: Normal.  -KIDNEYS/URETERS: No hydronephrosis or significant mass. -BLADDER: Normal.  -REPRODUCTIVE ORGANS: No pelvic masses.   -BOWEL: Mild gastric and colon distention, likely ileus. -LYMPH NODES: No significant retroperitoneal, mesenteric, or pelvic adenopathy. -BONES: No fracture or significant bone lesion. -VASCULATURE: Normal  -OTHER: Mild ascites, most prominent in the pelvis.   IMPRESSION  IMPRESSION: Acute pancreatitis and mild ascites    MRCP 1/14/2020  IMPRESSION:  1. Persistent acute pancreatitis without MR evidence of choledocholithiasis. Findings suspicious for necrotizing pancreatitis involving the head neck and  body of the pancreas, although not fully evaluated on this noncontrast MRI. 2.  Other chronic findings as above.     Assessment:     Principal Problem:    Pancreatitis (1/14/2020)    Active Problems:    Chronic pain (1/14/2020)      Alcohol abuse (1/14/2020)      Hypertension (1/14/2020)      Depression (1/14/2020)      Tobacco use (1/14/2020)      ENOC (acute kidney injury) ([facility] [address] (1/14/2020)      Hyponatremia (1/14/2020)      Hyperglycemia (1/14/2020)    45 y.o. male with hx of etoh abuse, tobacco abuse, depression, chronic pain on Suboxone, seen in consultation at the request of Dr. Ludwin Clements for acute pancreatitis with elevated LFT and lipase of 2180. Possible etiologies of acute pancreatitis can include etoh (admits to daily ETOH of 6-8 beers), gallstone pancreatitis (none seen on imaging but LFT are elevated), medications, hypertriglyceridemia, biliary obstruction (not seen on imaging). CT with acute pancreatitis and mild ascites. MRCP with evidence of necrotizing pancreatitis with no biliary dilation. Remains tachycardic. LDH elevated. Plan:     1. Liver chemistry continues to improve  2. Continue hydration  3. Supportive care with hydration, pain control  4. Thrombocytopenia persists but stable, now with drop in WBC and hgb  5. Tachycardia remains with elevated LDH - etoh withdrawal vs pancreatitis    Patient is seen and examined in collaboration with Dr. Hayley Young. Assessment and plan as per Dr. Hayley Young. Bharti Calles NP    ATTENDING NOTE:  I have seen the patient and agree with the above assessment and plan. Patient with acute alcoholic necrotizing pancreatitis and alcohol withdrawal syndrome and opiate withdrawal. I am pleased with the trending LFTs. Patient has been tolerating liquid diet. I have emphasized with patient and his family the importance of sobriety. There is concurrent evidence of fatty liver and patient is at notably increased risk for cirrhosis as well as chronic pancreatitis. Nothing to add further to current strategy of care but will arrange our office follow up in 4 weeks.      Alcira Granda MD

## 2020-01-18 LAB
ALBUMIN SERPL-MCNC: 2 G/DL (ref 3.5–5)
ALBUMIN/GLOB SERPL: 0.6 {RATIO} (ref 1.2–3.5)
ALP SERPL-CCNC: 91 U/L (ref 50–136)
ALT SERPL-CCNC: 55 U/L (ref 12–65)
ANION GAP SERPL CALC-SCNC: 7 MMOL/L (ref 7–16)
AST SERPL-CCNC: 38 U/L (ref 15–37)
BACTERIA SPEC CULT: NORMAL
BASOPHILS # BLD: 0 K/UL (ref 0–0.2)
BASOPHILS NFR BLD: 0 % (ref 0–2)
BILIRUB SERPL-MCNC: 1.7 MG/DL (ref 0.2–1.1)
BUN SERPL-MCNC: 6 MG/DL (ref 6–23)
CALCIUM SERPL-MCNC: 8.1 MG/DL (ref 8.3–10.4)
CHLORIDE SERPL-SCNC: 97 MMOL/L (ref 98–107)
CO2 SERPL-SCNC: 27 MMOL/L (ref 21–32)
CREAT SERPL-MCNC: 0.57 MG/DL (ref 0.8–1.5)
DIFFERENTIAL METHOD BLD: ABNORMAL
EOSINOPHIL # BLD: 0 K/UL (ref 0–0.8)
EOSINOPHIL NFR BLD: 1 % (ref 0.5–7.8)
ERYTHROCYTE [DISTWIDTH] IN BLOOD BY AUTOMATED COUNT: 13 % (ref 11.9–14.6)
GLOBULIN SER CALC-MCNC: 3.2 G/DL (ref 2.3–3.5)
GLUCOSE SERPL-MCNC: 83 MG/DL (ref 65–100)
HCT VFR BLD AUTO: 30.6 % (ref 41.1–50.3)
HGB BLD-MCNC: 10.3 G/DL (ref 13.6–17.2)
IMM GRANULOCYTES # BLD AUTO: 0.2 K/UL (ref 0–0.5)
IMM GRANULOCYTES NFR BLD AUTO: 4 % (ref 0–5)
LACTATE SERPL-SCNC: 0.9 MMOL/L (ref 0.4–2)
LDH SERPL L TO P-CCNC: 393 U/L (ref 100–190)
LIPASE SERPL-CCNC: 142 U/L (ref 73–393)
LYMPHOCYTES # BLD: 1.2 K/UL (ref 0.5–4.6)
LYMPHOCYTES NFR BLD: 29 % (ref 13–44)
MCH RBC QN AUTO: 31.9 PG (ref 26.1–32.9)
MCHC RBC AUTO-ENTMCNC: 33.7 G/DL (ref 31.4–35)
MCV RBC AUTO: 94.7 FL (ref 79.6–97.8)
MONOCYTES # BLD: 1.1 K/UL (ref 0.1–1.3)
MONOCYTES NFR BLD: 26 % (ref 4–12)
NEUTS SEG # BLD: 1.7 K/UL (ref 1.7–8.2)
NEUTS SEG NFR BLD: 40 % (ref 43–78)
NRBC # BLD: 0 K/UL (ref 0–0.2)
PLATELET # BLD AUTO: 71 K/UL (ref 150–450)
PMV BLD AUTO: 10.2 FL (ref 9.4–12.3)
POTASSIUM SERPL-SCNC: 3.6 MMOL/L (ref 3.5–5.1)
PROT SERPL-MCNC: 5.2 G/DL (ref 6.3–8.2)
RBC # BLD AUTO: 3.23 M/UL (ref 4.23–5.6)
SERVICE CMNT-IMP: NORMAL
SODIUM SERPL-SCNC: 131 MMOL/L (ref 136–145)
WBC # BLD AUTO: 4.2 K/UL (ref 4.3–11.1)

## 2020-01-18 PROCEDURE — 85025 COMPLETE CBC W/AUTO DIFF WBC: CPT

## 2020-01-18 PROCEDURE — 74011250636 HC RX REV CODE- 250/636: Performed by: HOSPITALIST

## 2020-01-18 PROCEDURE — 65660000000 HC RM CCU STEPDOWN

## 2020-01-18 PROCEDURE — 74011250636 HC RX REV CODE- 250/636: Performed by: NURSE PRACTITIONER

## 2020-01-18 PROCEDURE — 77030020263 HC SOL INJ SOD CL0.9% LFCR 1000ML

## 2020-01-18 PROCEDURE — 83615 LACTATE (LD) (LDH) ENZYME: CPT

## 2020-01-18 PROCEDURE — 83605 ASSAY OF LACTIC ACID: CPT

## 2020-01-18 PROCEDURE — 74011000258 HC RX REV CODE- 258: Performed by: NURSE PRACTITIONER

## 2020-01-18 PROCEDURE — 83690 ASSAY OF LIPASE: CPT

## 2020-01-18 PROCEDURE — 36415 COLL VENOUS BLD VENIPUNCTURE: CPT

## 2020-01-18 PROCEDURE — 74011000250 HC RX REV CODE- 250: Performed by: INTERNAL MEDICINE

## 2020-01-18 PROCEDURE — 80053 COMPREHEN METABOLIC PANEL: CPT

## 2020-01-18 PROCEDURE — 74011250637 HC RX REV CODE- 250/637: Performed by: NURSE PRACTITIONER

## 2020-01-18 PROCEDURE — 74011250636 HC RX REV CODE- 250/636: Performed by: INTERNAL MEDICINE

## 2020-01-18 PROCEDURE — 74011250637 HC RX REV CODE- 250/637: Performed by: HOSPITALIST

## 2020-01-18 PROCEDURE — 74011250637 HC RX REV CODE- 250/637: Performed by: INTERNAL MEDICINE

## 2020-01-18 RX ORDER — METOPROLOL TARTRATE 5 MG/5ML
5 INJECTION INTRAVENOUS
Status: DISCONTINUED | OUTPATIENT
Start: 2020-01-18 | End: 2020-01-18

## 2020-01-18 RX ORDER — CHLORDIAZEPOXIDE HYDROCHLORIDE 25 MG/1
25 CAPSULE, GELATIN COATED ORAL 3 TIMES DAILY
Status: DISCONTINUED | OUTPATIENT
Start: 2020-01-18 | End: 2020-01-21

## 2020-01-18 RX ORDER — LORAZEPAM 2 MG/ML
2 INJECTION INTRAMUSCULAR
Status: DISCONTINUED | OUTPATIENT
Start: 2020-01-18 | End: 2020-01-22

## 2020-01-18 RX ORDER — LORAZEPAM 2 MG/ML
1 INJECTION INTRAMUSCULAR ONCE
Status: COMPLETED | OUTPATIENT
Start: 2020-01-18 | End: 2020-01-18

## 2020-01-18 RX ORDER — LORAZEPAM 2 MG/ML
2 INJECTION INTRAMUSCULAR ONCE
Status: COMPLETED | OUTPATIENT
Start: 2020-01-18 | End: 2020-01-18

## 2020-01-18 RX ADMIN — METOPROLOL TARTRATE 5 MG: 5 INJECTION INTRAVENOUS at 20:57

## 2020-01-18 RX ADMIN — LORAZEPAM 1 MG: 2 INJECTION INTRAMUSCULAR; INTRAVENOUS at 06:35

## 2020-01-18 RX ADMIN — HYDRALAZINE HYDROCHLORIDE 10 MG: 20 INJECTION, SOLUTION INTRAMUSCULAR; INTRAVENOUS at 11:18

## 2020-01-18 RX ADMIN — SODIUM CHLORIDE 125 ML/HR: 900 INJECTION, SOLUTION INTRAVENOUS at 18:43

## 2020-01-18 RX ADMIN — PIPERACILLIN AND TAZOBACTAM 4.5 G: 4; .5 INJECTION, POWDER, FOR SOLUTION INTRAVENOUS at 10:41

## 2020-01-18 RX ADMIN — PIPERACILLIN AND TAZOBACTAM 4.5 G: 4; .5 INJECTION, POWDER, FOR SOLUTION INTRAVENOUS at 02:37

## 2020-01-18 RX ADMIN — LORAZEPAM 1 MG: 2 INJECTION INTRAMUSCULAR; INTRAVENOUS at 05:33

## 2020-01-18 RX ADMIN — LORAZEPAM 2 MG: 2 INJECTION INTRAMUSCULAR; INTRAVENOUS at 07:59

## 2020-01-18 RX ADMIN — Medication 10 ML: at 14:00

## 2020-01-18 RX ADMIN — LORAZEPAM 1 MG: 2 INJECTION INTRAMUSCULAR; INTRAVENOUS at 01:58

## 2020-01-18 RX ADMIN — PIPERACILLIN AND TAZOBACTAM 4.5 G: 4; .5 INJECTION, POWDER, FOR SOLUTION INTRAVENOUS at 18:32

## 2020-01-18 RX ADMIN — CHLORDIAZEPOXIDE HYDROCHLORIDE 25 MG: 25 CAPSULE ORAL at 21:46

## 2020-01-18 RX ADMIN — LORAZEPAM 2 MG: 2 INJECTION INTRAMUSCULAR; INTRAVENOUS at 17:06

## 2020-01-18 RX ADMIN — LORAZEPAM 2 MG: 2 INJECTION INTRAMUSCULAR; INTRAVENOUS at 19:50

## 2020-01-18 RX ADMIN — Medication 10 ML: at 05:03

## 2020-01-18 RX ADMIN — Medication 10 ML: at 21:52

## 2020-01-18 RX ADMIN — LORAZEPAM 2 MG: 2 INJECTION INTRAMUSCULAR; INTRAVENOUS at 07:58

## 2020-01-18 RX ADMIN — SODIUM CHLORIDE 125 ML/HR: 900 INJECTION, SOLUTION INTRAVENOUS at 01:59

## 2020-01-18 RX ADMIN — CLONIDINE HYDROCHLORIDE 0.3 MG: 0.1 TABLET ORAL at 21:47

## 2020-01-18 RX ADMIN — DICYCLOMINE HYDROCHLORIDE 10 MG: 10 CAPSULE ORAL at 21:47

## 2020-01-18 NOTE — PROGRESS NOTES
01/18/20 0601   CIWA/ETOH Withdrawal Scale   Nausea and Vomiting 0   Tactile Disturbances 0   Tremor 1   Auditory Disturbances 0   Paroxysmal Sweats 0   Visual Disturbances 2   Anxiety 1   Headache, Fullness in Head 0   Agitation 7   Orientation and Clouding of Sensorium 0   CIWA-Ar Total 11     Notified dr about patient's agitation despite administration of Ativan 1mg.

## 2020-01-18 NOTE — PROGRESS NOTES
Marko Chan called 2nd time.  at bedside talking with patient. Patient is verbally aggressive with dr. Jurado speaking with mother and father in hallway.

## 2020-01-18 NOTE — PROGRESS NOTES
01/17/20 2003 01/17/20 2352 01/18/20 0436   CIWA/ETOH Withdrawal Scale   Nausea and Vomiting 0 0 0   Tactile Disturbances 0 0 0   Tremor 0 1 1   Auditory Disturbances 0 0 0   Paroxysmal Sweats 0 0 0   Visual Disturbances 0 1 1   Anxiety 2 3 1   Headache, Fullness in Head 0 0 0   Agitation 0 0 5   Orientation and Clouding of Sensorium 0 0 0   CIWA-Ar Total 2 5 8      01/18/20 0550   CIWA/ETOH Withdrawal Scale   Nausea and Vomiting 0   Tactile Disturbances 0   Tremor 1   Auditory Disturbances 0   Paroxysmal Sweats 0   Visual Disturbances 0   Anxiety 1   Headache, Fullness in Head 0   Agitation 7   Orientation and Clouding of Sensorium 0   CIWA-Ar Total 9

## 2020-01-18 NOTE — PROGRESS NOTES
END OF SHIFT NOTE:    2 Code Greys were called on this shift due to patient's increasing agitation, verbal aggression, pulling of lines and remote telemetry. INTAKE/OUTPUT  01/17 0701 - 01/18 0700  In: 1443.4 [P.O.:120; I.V.:1323.4]  Out: 900 [Urine:900]  Voiding: YES  Catheter: NO  Drain:              Flatus: Patient does have flatus present. Stool:  1 occurrences. Characteristics:  Stool Assessment  Stool Color: Green  Stool Appearance: Loose  Stool Amount: Large  Stool Source/Status: Rectum    Emesis: 0 occurrences. Characteristics:        VITAL SIGNS  Patient Vitals for the past 12 hrs:   Temp Pulse Resp BP SpO2   01/18/20 0311  (!) 107      01/18/20 0310 98.3 °F (36.8 °C) 98 20 (!) 156/91 95 %   01/18/20 0121  96      01/18/20 0038  (!) 101      01/17/20 2353  (!) 114  (!) 171/100    01/17/20 2346 98.2 °F (36.8 °C) (!) 106 18 (!) 171/100 97 %   01/17/20 2340  (!) 114      01/17/20 2007  (!) 124      01/17/20 1959 98.9 °F (37.2 °C) (!) 108 22 132/86 96 %       Pain Assessment  Pain Intensity 1: 0 (01/18/20 0435)  Pain Location 1: Abdomen  Pain Intervention(s) 1: Emotional support  Patient Stated Pain Goal: 0    Ambulating  Yes    Shift report given to oncoming nurse at the bedside.     Jojo Berkowitz RN

## 2020-01-18 NOTE — PROGRESS NOTES
Patient has become increasing agitated and noncompliant. Patient's mother called this nurse to patient's room. Patient ripped off telemetry monitor and threatened to remove IV, and states \"I want to leave. \" Mother is refusing to allow this nurse to administer Ativan and states she feels this behavior is secondary to the Ativan administration previously during shift. Marko Escobar called when patient became more aggravated,angry, argumenative and refusing to allow this nurse to assist him back to bed and reapply cardiac monitor. Communuicated with hospitalist regarding the escalation in patient's behavior and mom's refusal to administer more Ativan.  suggested speaking with mother about the benefits of Ativan. Dr. Ruslan Scruggs verbal to allow patient to be off the remote telemetry for now.  advised this nurse to call him back if behavior continues to escalate. At bedside, when offering the Ativan to assist patient with increasing agitation, mother stated [employer] him whatever to help him. \" Mother has become hypervigilant.

## 2020-01-18 NOTE — PROGRESS NOTES
Spent time with the primary nurse prior to visit  She introduced  to family - thank you    Patient was awake  Some interaction with   Parents and sister also present    Provided non-judgmental support to all    Assured them of our care    Guided them in the presence of the [employer] offered    Provided meal tickets for parents    Will follow closely    Rosana Bermudez, staff [address], [address] [facility]  /   [email or web address]

## 2020-01-18 NOTE — PROGRESS NOTES
Hospitalist Progress Note    Subjective:   Daily Progress Note: 1/18/2020 2:48 PM    Patient presents to ER 1/14 with complaints of mid abdominal pain x 24 hours with diaphoresis.  Denies nausea, vomiting, diarrhea, but denies any po intake after pain began.  No history of pancreatitis, but does drink alcohol daily, also taking suboxone with last dose 1/13.  Blood pressure on arrival is 170/117 with heart rate of 135.  LFTs elevated, lipase 2180.  Hyponatremia to 127.  Creatinine 1.67. Found with right upper quad, epigastric, and left upper quad pain. Admitted on IVF, NPO, GI consult, ETOH  withdrawal protocols.   1/15:  Found patient with continued pain but feeling much better this am.  Advancing diet to clear liquids.  Lipase down to 731 today, plts: 70.  Discussed probable etiology for first episode pancreatitis is alcohol related, informed patient he would have recurrent bouts of pancreatitis if he continues to drink.  Father at bedside. Meagan Elizalde ETOH consumption for years, has been abstinent a few times without signs of DTs.  Encouraged to report any new symptoms. Remains intermittently tachycardic.  Does not seem to need librium at present.  Lactic down to 1.7 this am.     1/16:  Run of hypertension and tachycardia to 150 through night.  lopressor administered.  Platelets down to 64 this am, lipase 380. LDH: 596  Continued bloating.  Feeling about the same this am, no signs of withdrawal.  Remains tachycardic with low grade fever. Start zosyn. Radha Francis at bedside.  Dr Fabricio Lopez in to see patient also. Vi Pyle on board.  T max 99.4 x 24 hours.  Tolerating full liquids well.  Increased IVF rate   1/17: Afebrile. Tolerating full liquids with minimal nausea. Remains tachycardic, hypertension slowly improving but still elevated. Platelets 66 today. Still having pain. Father remains at bedside.  Lactic acid: 0.9, LDH: 49.  Still appears ill.       1/18:  Called to room early this am with Marko Schaeffer, patient in 4 point restraints attempting to get up, cursing, spitting, and striking out at parents and staff. Hallucinating at times. Did not respond to total of 2 mg ativan in 2 hours. Finally resting quietly with total of 4 mg in 4.5 hours. Blood pressure and heart rate down with meds administration. Mother initially refusing ativan. Apparently was up all night, out of bed, threatening staff. Parents hypervigilant. ADDITIONAL HISTORY:  Alcoholism, suboxone use, polysubstance use, depression.     Current Facility-Administered Medications   Medication Dose Route Frequency    chlordiazePOXIDE (LIBRIUM) capsule 25 mg  25 mg Oral TID    LORazepam (ATIVAN) injection 2 mg  2 mg IntraVENous Q2H PRN    cloNIDine HCL (CATAPRES) tablet 0.3 mg  0.3 mg Oral TID    piperacillin-tazobactam (ZOSYN) 4.5 g in 0.9% sodium chloride (MBP/ADV) 100 mL  4.5 g IntraVENous Q8H    thiamine HCL (B-1) tablet 100 mg  100 mg Oral DAILY    bisacodyL (DULCOLAX) suppository 10 mg  10 mg Rectal DAILY PRN    dicyclomine (BENTYL) capsule 10 mg  10 mg Oral QID    sodium chloride (NS) flush 5-40 mL  5-40 mL IntraVENous Q8H    sodium chloride (NS) flush 5-40 mL  5-40 mL IntraVENous PRN    HYDROmorphone (PF) (DILAUDID) injection 1 mg  1 mg IntraVENous Q4H PRN    naloxone (NARCAN) injection 0.4 mg  0.4 mg IntraVENous PRN    ondansetron (ZOFRAN) injection 4 mg  4 mg IntraVENous Q4H PRN    0.9% sodium chloride infusion  125 mL/hr IntraVENous CONTINUOUS    nicotine (NICODERM CQ) 21 mg/24 hr patch 1 Patch  1 Patch TransDERmal Q24H    hydrALAZINE (APRESOLINE) 20 mg/mL injection 10 mg  10 mg IntraVENous Q6H PRN    venlafaxine-SR (EFFEXOR-XR) capsule 150 mg  150 mg Oral DAILY WITH BREAKFAST    albuterol (PROVENTIL VENTOLIN) nebulizer solution 2.5 mg  2.5 mg Nebulization Q4H PRN    metoprolol (LOPRESSOR) injection 5 mg  5 mg IntraVENous Q4H PRN      Review of Systems  Patient is unable     Objective:     Visit Vitals  BP (!) 175/103 (BP 1 Location: Left arm)   Pulse 81   Temp 98.3 °F (36.8 °C)   Resp 20   Wt 109.3 kg (241 lb)   SpO2 95%   BMI 31.80 kg/m²      O2 Device: Room air    Temp (24hrs), Av.4 °F (36.9 °C), Min:98.1 °F (36.7 °C), Max:98.9 °F (37.2 °C)     190 -  0700  In: 2320.1 [P.O.:120; I.V.:2200.1]  Out: 1125 [Urine:1125]    General appearance: Completely confused, agitated, angry, striking out at anyone nearby, cursing and spitting. Mother continues to excuse him, initially refusing ativan. Both parents hypervigilant at bedside. Sister here today.     Head: Normocephalic, without obvious abnormality, atraumatic  Eyes: conjunctivae/corneas clear. PERRL  Neck: supple, symmetrical, trachea midline, and no JVD  Lungs: clear to auscultation bilaterally  Heart:  Heart rate remains erratic, intermittently tachy. regular rate and rhythm, S1, S2 normal, no murmur, click, rub or gallop  Abdomen: Bloated, tense.   Mild constipation.  Bowel sounds normal. No masses,  no organomegaly  Extremities: All extremities normal, atraumatic, no cyanosis or edema  Skin: Skin color, texture, turgor normal. No rashes or lesions  Neuro:  No signs of tremor, disorientation etc.     Additional comments: Notes,orders, test results, vitals reviewed    Data Review  Recent Results (from the past 24 hour(s))   GLUCOSE, POC    Collection Time: 20  5:55 PM   Result Value Ref Range    Glucose (POC) 104 (H) 65 - 100 mg/dL   GLUCOSE, POC    Collection Time: 20  9:39 PM   Result Value Ref Range    Glucose (POC) 160 (H) 65 - 451 mg/dL   METABOLIC PANEL, COMPREHENSIVE    Collection Time: 20  5:37 AM   Result Value Ref Range    Sodium 131 (L) 136 - 145 mmol/L    Potassium 3.6 3.5 - 5.1 mmol/L    Chloride 97 (L) 98 - 107 mmol/L    CO2 27 21 - 32 mmol/L    Anion gap 7 7 - 16 mmol/L    Glucose 83 65 - 100 mg/dL    BUN 6 6 - 23 MG/DL    Creatinine 0.57 (L) 0.8 - 1.5 MG/DL    GFR est AA >60 >60 ml/min/1.73m2    GFR est non-AA >60 >60 ml/min/1.73m2    Calcium 8.1 (L) 8.3 - 10.4 MG/DL    Bilirubin, total 1.7 (H) 0.2 - 1.1 MG/DL    ALT (SGPT) 55 12 - 65 U/L    AST (SGOT) 38 (H) 15 - 37 U/L    Alk. phosphatase 91 50 - 136 U/L    Protein, total 5.2 (L) 6.3 - 8.2 g/dL    Albumin 2.0 (L) 3.5 - 5.0 g/dL    Globulin 3.2 2.3 - 3.5 g/dL    A-G Ratio 0.6 (L) 1.2 - 3.5     CBC WITH AUTOMATED DIFF    Collection Time: 01/18/20  5:37 AM   Result Value Ref Range    WBC 4.2 (L) 4.3 - 11.1 K/uL    RBC 3.23 (L) 4.23 - 5.6 M/uL    HGB 10.3 (L) 13.6 - 17.2 g/dL    HCT 30.6 (L) 41.1 - 50.3 %    MCV 94.7 79.6 - 97.8 FL    MCH 31.9 26.1 - 32.9 PG    MCHC 33.7 31.4 - 35.0 g/dL    RDW 13.0 11.9 - 14.6 %    PLATELET 71 (L) 555 - 450 K/uL    MPV 10.2 9.4 - 12.3 FL    ABSOLUTE NRBC 0.00 0.0 - 0.2 K/uL    DF AUTOMATED      NEUTROPHILS 40 (L) 43 - 78 %    LYMPHOCYTES 29 13 - 44 %    MONOCYTES 26 (H) 4.0 - 12.0 %    EOSINOPHILS 1 0.5 - 7.8 %    BASOPHILS 0 0.0 - 2.0 %    IMMATURE GRANULOCYTES 4 0.0 - 5.0 %    ABS. NEUTROPHILS 1.7 1.7 - 8.2 K/UL    ABS. LYMPHOCYTES 1.2 0.5 - 4.6 K/UL    ABS. MONOCYTES 1.1 0.1 - 1.3 K/UL    ABS. EOSINOPHILS 0.0 0.0 - 0.8 K/UL    ABS. BASOPHILS 0.0 0.0 - 0.2 K/UL    ABS. IMM. GRANS. 0.2 0.0 - 0.5 K/UL   LIPASE    Collection Time: 01/18/20  5:37 AM   Result Value Ref Range    Lipase 142 73 - 393 U/L   LD    Collection Time: 01/18/20  5:37 AM   Result Value Ref Range     (H) 100 - 190 U/L   LACTIC ACID    Collection Time: 01/18/20  5:37 AM   Result Value Ref Range    Lactic acid 0.9 0.4 - 2.0 MMOL/L        1/14:  ABDOMINAL ULTRASOUND: . Hepatic steatosis.  Nonvisualization of the pancreas due to overlying bowel gas.     1/14:  CT ABDOMEN: Acute pancreatitis and mild ascites     1/15:  MRI ABDOMEN:  Persistent acute pancreatitis without MR evidence of choledocholithiasis.  Findings suspicious for necrotizing pancreatitis involving the head neck and body of the pancreas, although not fully evaluated on this noncontrast MRI.  Other chronic findings as above.      1/16: ECHOCARDIOGRAM:    -  Left ventricle: Systolic function was normal. Ejection fraction was  estimated in the range of 55 % to 60 %. There were no regional wall motion  abnormalities. Wall thickness was mildly increased. Assessment/Plan:   Acute alcoholic pancreatitis, first episode:               Daily labs, LDH, lactic acid              Monitor pain and nausea              Diet increased to clear liquids today              Continue IVF              Bentyl ordered for cramping               GI on board    Lipase trended down quickly      Elevated LFTs:  Slowly improving               Hepatitis panel pending               INR, HIV testing      Polysubstance abuse:  Attends suboxone clinic, last dose 1/13              Follow up on discharge     Acute UTI:  Culture with NGTD              Begin rocephin 1/16      Chronic pain on suboxone              Continue narcs here for pain, avoiding tylenol     Alcohol abuse: Monitor for withdrawls              Ativan prn              Scheduled librium begun 1/18      Uncontrolled Hypertension               PRN Hydralazine, may need chronic             treatment               Increasing clonidine to 0.3 mg tid      Depression:  Home effexor       Longstanding ongoing Tobacco use:               Patch prn               Ativan prn     ENOC:  Continue IVF:  NS at 125 ml/hr               Monitor      Intermittent tachycardia to 150:  EKG with ST:  No history of same.  ? Etiology.  Most likely due to illness and substance withdrawal               Lopressor prn                   Hyponatremia: replace and recheck: Most likely due to volume depletion               Continue to hydrate      Hyperglycemia:  A1C: 5.8              Due to disease process               Continue to monitor      Thrombocytopenia:  ? Splenic sequestration              Has not been on any type of [address]   Stable at 66 1/18     Hypokalemia:  Replace and recheck    Care Plan discussed with: Patient/Family and Nurse  .  Signed By: Arun Balderas NP     January 18, 2020

## 2020-01-18 NOTE — PROGRESS NOTES
Prior to shift change patient received 1 mg of Ativan at 0530 and 1 mg at 0635. Code Oscar Lucas was called at shift change. Order placed for ETOH withdrawal; 2 mg Ativan q 2 hrs PRN    Patient was restrained and given 2 mg Ativan at 0759. Restraints were removed. Pt was very lethargic all day. Patient was unable to take any PO medication during day shift. Hydralazine 10 mg given at 1118 for hypertension. Pt was assisted to [facility] x2 at 1430:   Voided 1600 ml of clear, yellow urine, no odor. Patient received Ativan 2 mg at 1706, resting comfortably. Family was very supportive and present all day. Nurse educated on withdrawal process and what to expect.

## 2020-01-19 LAB
ALBUMIN SERPL-MCNC: 1.4 G/DL (ref 3.5–5)
ALBUMIN/GLOB SERPL: 0.4 {RATIO} (ref 1.2–3.5)
ALP SERPL-CCNC: 92 U/L (ref 50–136)
ALT SERPL-CCNC: 42 U/L (ref 12–65)
ANION GAP SERPL CALC-SCNC: 7 MMOL/L (ref 7–16)
AST SERPL-CCNC: 23 U/L (ref 15–37)
BILIRUB SERPL-MCNC: 1.3 MG/DL (ref 0.2–1.1)
BUN SERPL-MCNC: 4 MG/DL (ref 6–23)
CALCIUM SERPL-MCNC: 8.5 MG/DL (ref 8.3–10.4)
CHLORIDE SERPL-SCNC: 103 MMOL/L (ref 98–107)
CO2 SERPL-SCNC: 28 MMOL/L (ref 21–32)
CREAT SERPL-MCNC: 0.61 MG/DL (ref 0.8–1.5)
ERYTHROCYTE [DISTWIDTH] IN BLOOD BY AUTOMATED COUNT: 13.4 % (ref 11.9–14.6)
GLOBULIN SER CALC-MCNC: 3.9 G/DL (ref 2.3–3.5)
GLUCOSE SERPL-MCNC: 94 MG/DL (ref 65–100)
HCT VFR BLD AUTO: 32 % (ref 41.1–50.3)
HGB BLD-MCNC: 10.7 G/DL (ref 13.6–17.2)
MAGNESIUM SERPL-MCNC: 1.9 MG/DL (ref 1.8–2.4)
MCH RBC QN AUTO: 32.6 PG (ref 26.1–32.9)
MCHC RBC AUTO-ENTMCNC: 33.4 G/DL (ref 31.4–35)
MCV RBC AUTO: 97.6 FL (ref 79.6–97.8)
NRBC # BLD: 0 K/UL (ref 0–0.2)
PLATELET # BLD AUTO: 89 K/UL (ref 150–450)
PLATELET # BLD AUTO: 94 K/UL (ref 150–450)
PMV BLD AUTO: 10.2 FL (ref 9.4–12.3)
POTASSIUM SERPL-SCNC: 3.1 MMOL/L (ref 3.5–5.1)
PROT SERPL-MCNC: 5.3 G/DL (ref 6.3–8.2)
RBC # BLD AUTO: 3.28 M/UL (ref 4.23–5.6)
SODIUM SERPL-SCNC: 138 MMOL/L (ref 136–145)
WBC # BLD AUTO: 4.4 K/UL (ref 4.3–11.1)

## 2020-01-19 PROCEDURE — 74011250637 HC RX REV CODE- 250/637: Performed by: HOSPITALIST

## 2020-01-19 PROCEDURE — 74011250636 HC RX REV CODE- 250/636: Performed by: NURSE PRACTITIONER

## 2020-01-19 PROCEDURE — 83735 ASSAY OF MAGNESIUM: CPT

## 2020-01-19 PROCEDURE — 74011250636 HC RX REV CODE- 250/636: Performed by: HOSPITALIST

## 2020-01-19 PROCEDURE — 74011250637 HC RX REV CODE- 250/637: Performed by: INTERNAL MEDICINE

## 2020-01-19 PROCEDURE — 74011250636 HC RX REV CODE- 250/636: Performed by: INTERNAL MEDICINE

## 2020-01-19 PROCEDURE — 74011000258 HC RX REV CODE- 258: Performed by: NURSE PRACTITIONER

## 2020-01-19 PROCEDURE — 85027 COMPLETE CBC AUTOMATED: CPT

## 2020-01-19 PROCEDURE — 74011250637 HC RX REV CODE- 250/637: Performed by: NURSE PRACTITIONER

## 2020-01-19 PROCEDURE — 77030020263 HC SOL INJ SOD CL0.9% LFCR 1000ML

## 2020-01-19 PROCEDURE — 80053 COMPREHEN METABOLIC PANEL: CPT

## 2020-01-19 PROCEDURE — 85049 AUTOMATED PLATELET COUNT: CPT

## 2020-01-19 PROCEDURE — 36415 COLL VENOUS BLD VENIPUNCTURE: CPT

## 2020-01-19 PROCEDURE — 65660000000 HC RM CCU STEPDOWN

## 2020-01-19 RX ORDER — POTASSIUM CHLORIDE 20 MEQ/1
40 TABLET, EXTENDED RELEASE ORAL 2 TIMES DAILY
Status: COMPLETED | OUTPATIENT
Start: 2020-01-19 | End: 2020-01-20

## 2020-01-19 RX ADMIN — POTASSIUM CHLORIDE 40 MEQ: 20 TABLET, EXTENDED RELEASE ORAL at 17:05

## 2020-01-19 RX ADMIN — CHLORDIAZEPOXIDE HYDROCHLORIDE 25 MG: 25 CAPSULE ORAL at 08:02

## 2020-01-19 RX ADMIN — DICYCLOMINE HYDROCHLORIDE 10 MG: 10 CAPSULE ORAL at 13:41

## 2020-01-19 RX ADMIN — HYDROMORPHONE HYDROCHLORIDE 1 MG: 1 INJECTION, SOLUTION INTRAMUSCULAR; INTRAVENOUS; SUBCUTANEOUS at 20:02

## 2020-01-19 RX ADMIN — PIPERACILLIN AND TAZOBACTAM 4.5 G: 4; .5 INJECTION, POWDER, FOR SOLUTION INTRAVENOUS at 18:30

## 2020-01-19 RX ADMIN — DICYCLOMINE HYDROCHLORIDE 10 MG: 10 CAPSULE ORAL at 17:04

## 2020-01-19 RX ADMIN — VENLAFAXINE HYDROCHLORIDE 150 MG: 75 CAPSULE, EXTENDED RELEASE ORAL at 08:03

## 2020-01-19 RX ADMIN — Medication 10 ML: at 21:20

## 2020-01-19 RX ADMIN — CLONIDINE HYDROCHLORIDE 0.3 MG: 0.1 TABLET ORAL at 21:16

## 2020-01-19 RX ADMIN — HYDROMORPHONE HYDROCHLORIDE 1 MG: 1 INJECTION, SOLUTION INTRAMUSCULAR; INTRAVENOUS; SUBCUTANEOUS at 10:42

## 2020-01-19 RX ADMIN — POTASSIUM CHLORIDE 40 MEQ: 20 TABLET, EXTENDED RELEASE ORAL at 10:33

## 2020-01-19 RX ADMIN — Medication 100 MG: at 08:02

## 2020-01-19 RX ADMIN — CHLORDIAZEPOXIDE HYDROCHLORIDE 25 MG: 25 CAPSULE ORAL at 21:16

## 2020-01-19 RX ADMIN — CLONIDINE HYDROCHLORIDE 0.3 MG: 0.1 TABLET ORAL at 08:02

## 2020-01-19 RX ADMIN — PIPERACILLIN AND TAZOBACTAM 4.5 G: 4; .5 INJECTION, POWDER, FOR SOLUTION INTRAVENOUS at 02:26

## 2020-01-19 RX ADMIN — SODIUM CHLORIDE 125 ML/HR: 900 INJECTION, SOLUTION INTRAVENOUS at 05:06

## 2020-01-19 RX ADMIN — CLONIDINE HYDROCHLORIDE 0.3 MG: 0.1 TABLET ORAL at 17:05

## 2020-01-19 RX ADMIN — CHLORDIAZEPOXIDE HYDROCHLORIDE 25 MG: 25 CAPSULE ORAL at 17:05

## 2020-01-19 RX ADMIN — DICYCLOMINE HYDROCHLORIDE 10 MG: 10 CAPSULE ORAL at 08:02

## 2020-01-19 RX ADMIN — PIPERACILLIN AND TAZOBACTAM 4.5 G: 4; .5 INJECTION, POWDER, FOR SOLUTION INTRAVENOUS at 10:33

## 2020-01-19 RX ADMIN — SODIUM CHLORIDE 125 ML/HR: 900 INJECTION, SOLUTION INTRAVENOUS at 21:19

## 2020-01-19 RX ADMIN — Medication 10 ML: at 13:42

## 2020-01-19 RX ADMIN — DICYCLOMINE HYDROCHLORIDE 10 MG: 10 CAPSULE ORAL at 21:16

## 2020-01-19 NOTE — PROGRESS NOTES
Hospitalist Progress Note    Subjective:   Daily Progress Note: 1/19/2020 1:26 PM    Patient presents to ER 1/14 with complaints of Dull, boring 7/10 mid abdominal pain x 24 hours with diaphoresis.  Denies nausea, vomiting, diarrhea, however, no po intake after pain began.  No history of pancreatitis, but does drink large quantities of alcohol daily x > 10 years. Also taking suboxone with last dose 1/13.  Blood pressure on arrival is 170/117 with heart rate of 135.  LFTs elevated, lipase 2180.  Hyponatremia to 127.  Creatinine 1.67. Found with right upper quad, epigastric, and left upper quad pain. Admitted on IVF, NPO, GI consult, ETOH  withdrawal protocols.   1/15:  Found patient with continued pain but feeling much better this am.  Advancing diet to clear liquids.  Lipase down to 731 today, plts: 70.  Discussed probable etiology for first episode pancreatitis is alcohol related, informed patient he would have recurrent bouts of pancreatitis if he continues to drink.  Father at bedside, enabling patient. Patient lives with parents, unemployed. [employer] ETOH consumption for years, has been abstinent a few times without signs of DTs, other than once a few years ago with questionable seizure.  Encouraged to report any new symptoms. Remains intermittently tachycardic.  Does not seem to need librium at present.  Lactic down to 1.7 this am.     1/16:  Run of hypertension and tachycardia to 150 through night. On remote tele in sinus tach. Lopressor administered.  Platelets down to 64 this am, lipase 380. LDH: 596  Continued bloating.  Feeling about the same this am, no signs of withdrawal.  Start zosyn, UTI and questionable CT as below.  Father remains vigilant at bedside, mother here also, hovering and attempting to dictate care.  Dr Gamaliel Copeland in to see patient also. Caridad Barcenas on board.  T max 99.4 x 24 hours.  Tolerating full liquids well.  Increased IVF rate   1/17:  Afebrile.  Tolerating full liquids with minimal nausea.  Remains tachycardic, hypertension slowly improving but still elevated.  Platelets 66 today. Still having pain, but improved. Father remains at bedside. Lactic acid: 0.9, LDH: 49. Still appears ill.     1/18:  Called to room early this am with Marko Shanae , patient in 4 point restraints attempting to get up, cursing, spitting, and striking out at parents and staff. Hallucinating at times. Did not respond to total of 2 mg ativan in 2 hours. Dr Debo Fitzgerald here also. Finally resting quietly after total of 4 mg Ativan in 4.5 hours. Blood pressure and heart rate down with meds administration. Mother initially refusing ativan. Apparently, patient was up all night, out of bed, threatening staff. Parents remain hypervigilant.  scheduled librium begun. 1/19: Improved today. States he is worn out. Back to baseline of cooperation and civility. Does not remember events from past 24 hours. Tired. Tolerating liquid diet well.   Loose BM.      ADDITIONAL HISTORY:  Alcoholism, suboxone use, polysubstance use, depression.     Current Facility-Administered Medications   Medication Dose Route Frequency    potassium chloride (K-DUR, KLOR-CON) SR tablet 40 mEq  40 mEq Oral BID    chlordiazePOXIDE (LIBRIUM) capsule 25 mg  25 mg Oral TID    LORazepam (ATIVAN) injection 2 mg  2 mg IntraVENous Q2H PRN    cloNIDine HCL (CATAPRES) tablet 0.3 mg  0.3 mg Oral TID    piperacillin-tazobactam (ZOSYN) 4.5 g in 0.9% sodium chloride (MBP/ADV) 100 mL  4.5 g IntraVENous Q8H    thiamine HCL (B-1) tablet 100 mg  100 mg Oral DAILY    bisacodyL (DULCOLAX) suppository 10 mg  10 mg Rectal DAILY PRN    dicyclomine (BENTYL) capsule 10 mg  10 mg Oral QID    sodium chloride (NS) flush 5-40 mL  5-40 mL IntraVENous Q8H    sodium chloride (NS) flush 5-40 mL  5-40 mL IntraVENous PRN    HYDROmorphone (PF) (DILAUDID) injection 1 mg  1 mg IntraVENous Q4H PRN    naloxone (NARCAN) injection 0.4 mg  0.4 mg IntraVENous PRN    ondansetron (ZOFRAN) injection 4 mg  4 mg IntraVENous Q4H PRN    0.9% sodium chloride infusion  125 mL/hr IntraVENous CONTINUOUS    nicotine (NICODERM CQ) 21 mg/24 hr patch 1 Patch  1 Patch TransDERmal Q24H    hydrALAZINE (APRESOLINE) 20 mg/mL injection 10 mg  10 mg IntraVENous Q6H PRN    venlafaxine-SR (EFFEXOR-XR) capsule 150 mg  150 mg Oral DAILY WITH BREAKFAST    albuterol (PROVENTIL VENTOLIN) nebulizer solution 2.5 mg  2.5 mg Nebulization Q4H PRN    metoprolol (LOPRESSOR) injection 5 mg  5 mg IntraVENous Q4H PRN      Review of Systems  A comprehensive review of systems was negative except for that written in the HPI. Objective:     Visit Vitals  /80   Pulse (!) 110   Temp 98.7 °F (37.1 °C)   Resp 20   Wt 109.3 kg (241 lb)   SpO2 96%   BMI 31.80 kg/m²      O2 Device: Room air    Temp (24hrs), Av.4 °F (36.9 °C), Min:97.7 °F (36.5 °C), Max:98.7 °F (37.1 °C)     07 -  1900  In: -   Out: 8230 [XTUCU:5192]  1901 -  0700  In: 1443.4 [P.O.:120; I.V.:1323.4]  Out: 3500 [Urine:3500]    General appearance: Exhausted, calm and cooperative. Does not remember events of yesterday. Parents remain hypervigilent at bedside. Oriented and alert. Pain improved.     Head: Normocephalic, without obvious abnormality, atraumatic  Eyes: conjunctivae/corneas clear. PERRL  Neck: supple, symmetrical, trachea midline, and no JVD  Lungs: clear to auscultation bilaterally  Heart:  Heart rate remains erratic, intermittently tachy. regular rate and rhythm, S1, S2 normal, no murmur, click, rub or gallop  Abdomen: Bloated, tense.   Mild constipation.  Bowel sounds normal. No masses,  no organomegaly  Extremities: All extremities normal, atraumatic, no cyanosis or edema  Skin: Skin color, texture, turgor normal. No rashes or lesions  Neuro:  No signs of tremor, disorientation etc.      Additional comments: Notes,orders, test results, vitals reviewed    Data Review  Recent Results (from the past 24 hour(s))   PLATELET COUNT Collection Time: 01/19/20  4:22 AM   Result Value Ref Range    PLATELET 94 (L) 162 - 450 K/uL   CBC W/O DIFF    Collection Time: 01/19/20  4:22 AM   Result Value Ref Range    WBC 4.4 4.3 - 11.1 K/uL    RBC 3.28 (L) 4.23 - 5.6 M/uL    HGB 10.7 (L) 13.6 - 17.2 g/dL    HCT 32.0 (L) 41.1 - 50.3 %    MCV 97.6 79.6 - 97.8 FL    MCH 32.6 26.1 - 32.9 PG    MCHC 33.4 31.4 - 35.0 g/dL    RDW 13.4 11.9 - 14.6 %    PLATELET 89 (L) 529 - 450 K/uL    MPV 10.2 9.4 - 12.3 FL    ABSOLUTE NRBC 0.00 0.0 - 0.2 K/uL   METABOLIC PANEL, COMPREHENSIVE    Collection Time: 01/19/20  4:22 AM   Result Value Ref Range    Sodium 138 136 - 145 mmol/L    Potassium 3.1 (L) 3.5 - 5.1 mmol/L    Chloride 103 98 - 107 mmol/L    CO2 28 21 - 32 mmol/L    Anion gap 7 7 - 16 mmol/L    Glucose 94 65 - 100 mg/dL    BUN 4 (L) 6 - 23 MG/DL    Creatinine 0.61 (L) 0.8 - 1.5 MG/DL    GFR est AA >60 >60 ml/min/1.73m2    GFR est non-AA >60 >60 ml/min/1.73m2    Calcium 8.5 8.3 - 10.4 MG/DL    Bilirubin, total 1.3 (H) 0.2 - 1.1 MG/DL    ALT (SGPT) 42 12 - 65 U/L    AST (SGOT) 23 15 - 37 U/L    Alk. phosphatase 92 50 - 136 U/L    Protein, total 5.3 (L) 6.3 - 8.2 g/dL    Albumin 1.4 (L) 3.5 - 5.0 g/dL    Globulin 3.9 (H) 2.3 - 3.5 g/dL    A-G Ratio 0.4 (L) 1.2 - 3.5     MAGNESIUM    Collection Time: 01/19/20  4:22 AM   Result Value Ref Range    Magnesium 1.9 1.8 - 2.4 mg/dL      1/14:  ABDOMINAL ULTRASOUND: . Hepatic steatosis.  Nonvisualization of the pancreas due to overlying bowel gas.     1/14:  CT ABDOMEN: Acute pancreatitis and mild ascites     1/15:  MRI ABDOMEN:  Persistent acute pancreatitis without MR evidence of choledocholithiasis.  Findings suspicious for necrotizing pancreatitis involving the head neck and body of the pancreas, although not fully evaluated on this noncontrast MRI.  Other chronic findings as above.      1/16:  ECHOCARDIOGRAM:    -  Left ventricle: Systolic function was normal. Ejection fraction was  estimated in the range of 55 % to 60 %. There were no regional wall motion  abnormalities. Wall thickness was mildly increased. Assessment/Plan:   Acute alcoholic pancreatitis, first episode: Concern for necrotizing pancreatitis              Daily labs              Monitor pain and nausea              Tolerating full liquids 1/17              Continue IVF              Bentyl ordered for cramping               GI on board               Lipase trended down quickly      Elevated LFTs:  Slowly improving               Multiple tests pending per GI     Polysubstance abuse:  Attends suboxone clinic, last dose 1/13              Follow up on discharge     Acute UTI:  Culture with NGTD              Begin rocephin 1/16      Chronic pain on suboxone              Continue narcs for pain, avoid tylenol     Alcohol abuse: Monitor for withdrawls              Ativan prn              Scheduled librium begun 1/18     Acute alcohol withdrawls 1/17-1/18 with hallucinations, psychotic features requiring 4 point restraints and ativan   Improved 1/19    Uncontrolled Hypertension               PRN Hydralazine, will most likely need  Chronic treatment               Increasing clonidine to 0.3 mg tid    Slowly improving      Depression:  Home effexor       Longstanding ongoing Tobacco use:               Patch prn               Ativan prn     ENOC:  Continue IVF:  NS at 125 ml/hr               Monitor      Intermittent tachycardia to 150:  EKG with Sinus tach:  No history of same.  ? Etiology.  Most likely due to illness and substance withdrawal               Lopressor prn    Echo above                  Hyponatremia: replace and recheck: Most likely due to volume depletion:  Improved               Continue to hydrate      Hyperglycemia:  A1C: 5.8              Due to disease process               Continue to monitor      Thrombocytopenia:  ? Splenic sequestration              Has not been on any type of [address]              Stable at 89 1/19     Hypokalemia:  Replace and recheck    Familial dysfunction:  Patient unemployed, lives with parents.   Both parents hypervigilant and enabling     Care Plan discussed with: Patient, parents  and Nurse    Signed By: Juan Diego Falcon NP     January 19, 2020

## 2020-01-19 NOTE — PROGRESS NOTES
01/19/20 1046   Pain 1   Pain Scale 1 Numeric (0 - 10)   Pain Intensity 1 8   Pain Location 1 Abdomen   Pain Description 1 Aching

## 2020-01-19 NOTE — PROGRESS NOTES
END OF SHIFT NOTE:    INTAKE/OUTPUT  01/18 0701 - 01/19 0700  In: -   Out: 2800 [Urine:2800]  Voiding: YES  Catheter: NO  Drain:              Flatus: Patient does have flatus present. Stool:  0 occurrences. Characteristics:  Stool Assessment  Stool Color: Green  Stool Appearance: Loose  Stool Amount: Large  Stool Source/Status: Rectum    Emesis: 0 occurrences. Characteristics:        VITAL SIGNS  Patient Vitals for the past 12 hrs:   Temp Pulse Resp BP SpO2   01/19/20 0347 98.4 °F (36.9 °C) (!) 126 23 (!) 178/94 94 %   01/19/20 0240  (!) 110      01/18/20 2337 97.7 °F (36.5 °C) (!) 118 18 133/88 91 %   01/18/20 2057    106/74    01/18/20 2029  (!) 134      01/18/20 2015 98.4 °F (36.9 °C) (!) 135 22 106/74 93 %       Pain Assessment  Pain Intensity 1: 0 (01/18/20 0700)  Pain Location 1: Abdomen  Pain Intervention(s) 1: Emotional support  Patient Stated Pain Goal: 0    Ambulating  Yes    Shift report given to oncoming nurse at the bedside.     Rajwinder Wang RN

## 2020-01-20 LAB
ALBUMIN SERPL-MCNC: 1.8 G/DL (ref 3.5–5)
ALBUMIN/GLOB SERPL: 0.5 {RATIO} (ref 1.2–3.5)
ALP SERPL-CCNC: 83 U/L (ref 50–136)
ALT SERPL-CCNC: 35 U/L (ref 12–65)
ANION GAP SERPL CALC-SCNC: 7 MMOL/L (ref 7–16)
AST SERPL-CCNC: 19 U/L (ref 15–37)
BILIRUB SERPL-MCNC: 0.8 MG/DL (ref 0.2–1.1)
BUN SERPL-MCNC: 4 MG/DL (ref 6–23)
CALCIUM SERPL-MCNC: 8.5 MG/DL (ref 8.3–10.4)
CHLORIDE SERPL-SCNC: 106 MMOL/L (ref 98–107)
CO2 SERPL-SCNC: 28 MMOL/L (ref 21–32)
CREAT SERPL-MCNC: 0.6 MG/DL (ref 0.8–1.5)
ERYTHROCYTE [DISTWIDTH] IN BLOOD BY AUTOMATED COUNT: 13.2 % (ref 11.9–14.6)
GLOBULIN SER CALC-MCNC: 3.3 G/DL (ref 2.3–3.5)
GLUCOSE SERPL-MCNC: 96 MG/DL (ref 65–100)
HCT VFR BLD AUTO: 29 % (ref 41.1–50.3)
HGB BLD-MCNC: 9.7 G/DL (ref 13.6–17.2)
MCH RBC QN AUTO: 32.3 PG (ref 26.1–32.9)
MCHC RBC AUTO-ENTMCNC: 33.4 G/DL (ref 31.4–35)
MCV RBC AUTO: 96.7 FL (ref 79.6–97.8)
NRBC # BLD: 0.02 K/UL (ref 0–0.2)
PLATELET # BLD AUTO: 114 K/UL (ref 150–450)
PMV BLD AUTO: 9.6 FL (ref 9.4–12.3)
POTASSIUM SERPL-SCNC: 3.4 MMOL/L (ref 3.5–5.1)
PROT SERPL-MCNC: 5.1 G/DL (ref 6.3–8.2)
RBC # BLD AUTO: 3 M/UL (ref 4.23–5.6)
SODIUM SERPL-SCNC: 141 MMOL/L (ref 136–145)
WBC # BLD AUTO: 5.4 K/UL (ref 4.3–11.1)

## 2020-01-20 PROCEDURE — 74011250636 HC RX REV CODE- 250/636: Performed by: NURSE PRACTITIONER

## 2020-01-20 PROCEDURE — 80053 COMPREHEN METABOLIC PANEL: CPT

## 2020-01-20 PROCEDURE — 65660000000 HC RM CCU STEPDOWN

## 2020-01-20 PROCEDURE — 85027 COMPLETE CBC AUTOMATED: CPT

## 2020-01-20 PROCEDURE — 74011250637 HC RX REV CODE- 250/637: Performed by: INTERNAL MEDICINE

## 2020-01-20 PROCEDURE — 74011250637 HC RX REV CODE- 250/637: Performed by: NURSE PRACTITIONER

## 2020-01-20 PROCEDURE — 36415 COLL VENOUS BLD VENIPUNCTURE: CPT

## 2020-01-20 PROCEDURE — 74011250636 HC RX REV CODE- 250/636: Performed by: INTERNAL MEDICINE

## 2020-01-20 PROCEDURE — 74011000258 HC RX REV CODE- 258: Performed by: NURSE PRACTITIONER

## 2020-01-20 PROCEDURE — 74011250636 HC RX REV CODE- 250/636: Performed by: HOSPITALIST

## 2020-01-20 PROCEDURE — 77030020263 HC SOL INJ SOD CL0.9% LFCR 1000ML

## 2020-01-20 PROCEDURE — 74011250637 HC RX REV CODE- 250/637: Performed by: HOSPITALIST

## 2020-01-20 RX ADMIN — DICYCLOMINE HYDROCHLORIDE 10 MG: 10 CAPSULE ORAL at 16:56

## 2020-01-20 RX ADMIN — VENLAFAXINE HYDROCHLORIDE 150 MG: 75 CAPSULE, EXTENDED RELEASE ORAL at 09:06

## 2020-01-20 RX ADMIN — Medication 10 ML: at 14:28

## 2020-01-20 RX ADMIN — POTASSIUM CHLORIDE 40 MEQ: 20 TABLET, EXTENDED RELEASE ORAL at 09:05

## 2020-01-20 RX ADMIN — Medication 10 ML: at 05:29

## 2020-01-20 RX ADMIN — PIPERACILLIN AND TAZOBACTAM 4.5 G: 4; .5 INJECTION, POWDER, FOR SOLUTION INTRAVENOUS at 12:40

## 2020-01-20 RX ADMIN — CLONIDINE HYDROCHLORIDE 0.3 MG: 0.1 TABLET ORAL at 21:58

## 2020-01-20 RX ADMIN — CHLORDIAZEPOXIDE HYDROCHLORIDE 25 MG: 25 CAPSULE ORAL at 16:53

## 2020-01-20 RX ADMIN — DICYCLOMINE HYDROCHLORIDE 10 MG: 10 CAPSULE ORAL at 21:58

## 2020-01-20 RX ADMIN — Medication 10 ML: at 21:59

## 2020-01-20 RX ADMIN — PIPERACILLIN AND TAZOBACTAM 4.5 G: 4; .5 INJECTION, POWDER, FOR SOLUTION INTRAVENOUS at 02:11

## 2020-01-20 RX ADMIN — CLONIDINE HYDROCHLORIDE 0.3 MG: 0.1 TABLET ORAL at 09:05

## 2020-01-20 RX ADMIN — DICYCLOMINE HYDROCHLORIDE 10 MG: 10 CAPSULE ORAL at 09:05

## 2020-01-20 RX ADMIN — HYDROMORPHONE HYDROCHLORIDE 1 MG: 1 INJECTION, SOLUTION INTRAMUSCULAR; INTRAVENOUS; SUBCUTANEOUS at 14:34

## 2020-01-20 RX ADMIN — SODIUM CHLORIDE 125 ML/HR: 900 INJECTION, SOLUTION INTRAVENOUS at 05:28

## 2020-01-20 RX ADMIN — CHLORDIAZEPOXIDE HYDROCHLORIDE 25 MG: 25 CAPSULE ORAL at 21:58

## 2020-01-20 RX ADMIN — Medication 100 MG: at 09:06

## 2020-01-20 RX ADMIN — CHLORDIAZEPOXIDE HYDROCHLORIDE 25 MG: 25 CAPSULE ORAL at 09:06

## 2020-01-20 RX ADMIN — DICYCLOMINE HYDROCHLORIDE 10 MG: 10 CAPSULE ORAL at 14:28

## 2020-01-20 RX ADMIN — PIPERACILLIN AND TAZOBACTAM 4.5 G: 4; .5 INJECTION, POWDER, FOR SOLUTION INTRAVENOUS at 19:00

## 2020-01-20 RX ADMIN — CLONIDINE HYDROCHLORIDE 0.3 MG: 0.1 TABLET ORAL at 16:53

## 2020-01-20 NOTE — PROGRESS NOTES
END OF SHIFT NOTE:    INTAKE/OUTPUT  01/19 0701 - 01/20 0700  In: 8320 [I.V.:4847]  Out: 1626 [Urine:3200]  Voiding: YES  Catheter: NO  Drain:              Flatus: Patient does have flatus present. Stool:  0 occurrences. Characteristics:  Stool Assessment  Stool Color: Brown  Stool Appearance: Loose  Stool Amount: Large  Stool Source/Status: Rectum    Emesis: 0 occurrences. Characteristics:        VITAL SIGNS  Patient Vitals for the past 12 hrs:   Temp Pulse Resp BP SpO2   01/20/20 0353 98.1 °F (36.7 °C) 91 20 156/89 93 %   01/19/20 2325 98.6 °F (37 °C) 91 20 (!) 146/91 95 %   01/19/20 1952 98.2 °F (36.8 °C) 95 20 129/84    01/19/20 1940  98          Pain Assessment  Pain Intensity 1: 0 (01/19/20 2100)  Pain Location 1: Abdomen  Pain Intervention(s) 1: Medication (see MAR)  Patient Stated Pain Goal: 0    Ambulating  Yes    Shift report given to oncoming nurse at the bedside.     Margaret Miranda RN

## 2020-01-20 NOTE — PROGRESS NOTES
Nutrition  Reason for assessment: LOS Day 5    Assessment:   Diet: DIET FULL LIQUID    Food/Nutrition Patient History:    Pt is 46 yo male who presented with severe abdominal pain. Pt found to have pancreatitis due to excessive alcohol intake. Pt has history HTN, ENOC and alcohol abuse. Pt was drinking at least 6 beers daily prior to admission. Pt and mother where present in room. Pt reports that he is still experiencing bloating but no longer abdominal pain. He states he has no N/V but did have some about a week prior to coming to the hospital and thought it was just some bad food. Pt reports he is consuming everything on the tray including the ensure enlives. Explained the importance of Ensure Enlives currently as excellent source of protein, and pt should aim to have those at every meal. He does not need to force himself to have everything on the tray if it is causing bloating. Explained to pt and mother how diet advancement would happen and the time line. Explained some common things to avoid to prevent pancreatitis flare ups. Pt and mother and no other questions at this time. Labs: Potassium 3.4 (L); BUN 4 (L), Creatinine 0.60 (L)    Anthropometrics:   Height 1.854m, Weight: 109.3 kg (241 lb), Weight Source: Bed, Body mass index is 31.8 kg/m². BMI class of overweight. Macronutrient needs: Pancreatitis MSJ (109.3kg)  EER: 2236-5888 kcal/day (20-22 kcals/kg)  EPR: 109-131 g/day (1-1.2 g/kg )     Intake/Comparative Standards: Per patient, consuming 100% most meals. Pt is on full liquids diet with ensure enlives. Pt only meeting needs if consuming all items of full liquid tray and 3 EE per day.    Patient Vitals for the past 100 hrs:   % Diet Eaten   01/16/20 1820 100 %   01/16/20 1256 100 %   01/16/20 0820 100 %       Nutrition Diagnosis:  Predicted inadequate oral intake related to acute pancreatitis and decreased intake as evidenced by full liquids diet and pt using alcohol as primary fuel source. Nutrition Intervention:  Meals and Snacks: Continue with current diet. GI to advance diet as medically appropriate.    Commercial Beverages and Supplements: Continue with Ensure Enlive TID  Discharge Nutrition Plan: Too soon to determine    Sherwin Kasper [address], [address] [place] [address],    [phone]

## 2020-01-20 NOTE — PROGRESS NOTES
Hospitalist Progress Note    Subjective:   Daily Progress Note: 1/20/2020 1:26 PM    Patient is a 46 yo male with PMH of  Alcoholism, suboxone use, polysubstance use, and depression who was admitted through the ER on 1/14/2020 with reports of abdominal pain x 24 hours and diaphoresis. In ER BP was 170/117 with heart rate of 135.  LFTs were elevated,  lipase of 2180, Na 127, and Creatinine 1.67. Patient admitted with pancreatitis. GI consulted. CT with acute pancreatitis and mild ascites. MRCP with evidence of necrotizing pancreatitis with no biliary dilation. LDH elevated. Patient placed on CIWA protocol. Patient experiencing withdraw with reported episode of  Code June Malling called. Patient was placed in \" 4 point restraints attempting to get up, cursing, spitting, and striking out at parents and staff. Hallucinating at times\", started on librium. Subjective  Patient alert and oriented. Reports improvement in abdominal pain and distention. Tolerating FLD. Denies nausea or vomiting, HA, tremors. Pitting edema noted on BLE.        ADDITIONAL HISTORY:  Alcoholism, suboxone use, polysubstance use, depression.     Current Facility-Administered Medications   Medication Dose Route Frequency    chlordiazePOXIDE (LIBRIUM) capsule 25 mg  25 mg Oral TID    LORazepam (ATIVAN) injection 2 mg  2 mg IntraVENous Q2H PRN    cloNIDine HCL (CATAPRES) tablet 0.3 mg  0.3 mg Oral TID    piperacillin-tazobactam (ZOSYN) 4.5 g in 0.9% sodium chloride (MBP/ADV) 100 mL  4.5 g IntraVENous Q8H    thiamine HCL (B-1) tablet 100 mg  100 mg Oral DAILY    bisacodyL (DULCOLAX) suppository 10 mg  10 mg Rectal DAILY PRN    dicyclomine (BENTYL) capsule 10 mg  10 mg Oral QID    sodium chloride (NS) flush 5-40 mL  5-40 mL IntraVENous Q8H    sodium chloride (NS) flush 5-40 mL  5-40 mL IntraVENous PRN    HYDROmorphone (PF) (DILAUDID) injection 1 mg  1 mg IntraVENous Q4H PRN    naloxone (NARCAN) injection 0.4 mg  0.4 mg IntraVENous PRN    ondansetron (ZOFRAN) injection 4 mg  4 mg IntraVENous Q4H PRN    0.9% sodium chloride infusion  75 mL/hr IntraVENous CONTINUOUS    nicotine (NICODERM CQ) 21 mg/24 hr patch 1 Patch  1 Patch TransDERmal Q24H    hydrALAZINE (APRESOLINE) 20 mg/mL injection 10 mg  10 mg IntraVENous Q6H PRN    venlafaxine-SR (EFFEXOR-XR) capsule 150 mg  150 mg Oral DAILY WITH BREAKFAST    albuterol (PROVENTIL VENTOLIN) nebulizer solution 2.5 mg  2.5 mg Nebulization Q4H PRN    metoprolol (LOPRESSOR) injection 5 mg  5 mg IntraVENous Q4H PRN      Review of Systems  A comprehensive review of systems was negative except for that written in the HPI. Objective:     Visit Vitals  BP (!) 153/92   Pulse 91   Temp 98.5 °F (36.9 °C)   Resp 20   Wt 109.3 kg (241 lb)   SpO2 94%   BMI 31.80 kg/m²      O2 Device: Room air    Temp (24hrs), Av.4 °F (36.9 °C), Min:98.1 °F (36.7 °C), Max:98.7 °F (37.1 °C)     07 - 1900  In: -   Out: 229 [YSYRT:697]  1901 -  0700  In: 9712 [I.V.:5456]  Out: 4400 [VDYZW:2407]    General appearance: resting quietly in bed. No distress noted.     Head: Normocephalic, without obvious abnormality, atraumatic  Eyes: conjunctivae/corneas clear. PERRL  Neck: supple, symmetrical, trachea midline, and no JVD  Lungs: clear to auscultation bilaterally  Heart:  Heart rate remains erratic, intermittently tachy. regular rate and rhythm, S1, S2 normal, no murmur, click, rub or gallop  Abdomen: Distended. Mild constipation.  Bowel sounds normal. No masses,  no organomegaly  Extremities: All extremities normal, atraumatic, no cyanosis. BLE pitting edema.    Skin: Skin color, texture, turgor normal. No rashes or lesions  Neuro:  No signs of tremor, disorientation etc.      Additional comments: Notes,orders, test results, vitals reviewed    Data Review  Recent Results (from the past 24 hour(s))   CBC W/O DIFF    Collection Time: 20  5:15 AM   Result Value Ref Range    WBC 5.4 4.3 - 11.1 K/uL    RBC 3.00 (L) 4.23 - 5.6 M/uL    HGB 9.7 (L) 13.6 - 17.2 g/dL    HCT 29.0 (L) 41.1 - 50.3 %    MCV 96.7 79.6 - 97.8 FL    MCH 32.3 26.1 - 32.9 PG    MCHC 33.4 31.4 - 35.0 g/dL    RDW 13.2 11.9 - 14.6 %    PLATELET 420 (L) 871 - 450 K/uL    MPV 9.6 9.4 - 12.3 FL    ABSOLUTE NRBC 0.02 0.0 - 0.2 K/uL   METABOLIC PANEL, COMPREHENSIVE    Collection Time: 01/20/20  5:15 AM   Result Value Ref Range    Sodium 141 136 - 145 mmol/L    Potassium 3.4 (L) 3.5 - 5.1 mmol/L    Chloride 106 98 - 107 mmol/L    CO2 28 21 - 32 mmol/L    Anion gap 7 7 - 16 mmol/L    Glucose 96 65 - 100 mg/dL    BUN 4 (L) 6 - 23 MG/DL    Creatinine 0.60 (L) 0.8 - 1.5 MG/DL    GFR est AA >60 >60 ml/min/1.73m2    GFR est non-AA >60 >60 ml/min/1.73m2    Calcium 8.5 8.3 - 10.4 MG/DL    Bilirubin, total 0.8 0.2 - 1.1 MG/DL    ALT (SGPT) 35 12 - 65 U/L    AST (SGOT) 19 15 - 37 U/L    Alk. phosphatase 83 50 - 136 U/L    Protein, total 5.1 (L) 6.3 - 8.2 g/dL    Albumin 1.8 (L) 3.5 - 5.0 g/dL    Globulin 3.3 2.3 - 3.5 g/dL    A-G Ratio 0.5 (L) 1.2 - 3.5        1/14:  ABDOMINAL ULTRASOUND: . Hepatic steatosis. Nonvisualization of the pancreas due to overlying bowel gas.     1/14:  CT ABDOMEN: Acute pancreatitis and mild ascites     1/15:  MRI ABDOMEN:  Persistent acute pancreatitis without MR evidence of choledocholithiasis.  Findings suspicious for necrotizing pancreatitis involving the head neck and body of the pancreas, although not fully evaluated on this noncontrast MRI.  Other chronic findings as above.      1/16:  ECHOCARDIOGRAM:    -  Left ventricle: Systolic function was normal. Ejection fraction was  estimated in the range of 55 % to 60 %. There were no regional wall motion  abnormalities. Wall thickness was mildly increased.      Assessment/Plan:   Acute alcoholic pancreatitis, first episode: Concern for necrotizing pancreatitis              Daily labs              Monitor pain and nausea              Tolerating full liquids               decrease IVF due to BLE pitting edema               Bentyl ordered for cramping               GI on board               Lipase trended down quickly      Elevated LFTs:  Slowly improving               Multiple tests pending per GI     Polysubstance abuse:  Attends suboxone clinic, last dose 1/13              Follow up on discharge     Acute UTI:  Culture with NGTD              Begin rocephin 1/16      Chronic pain on suboxone              Continue narcs for pain, avoid tylenol     Alcohol abuse: Monitor for withdrawls              Ativan prn              Scheduled librium begun 1/18     Acute alcohol withdrawls 1/17-1/18 with hallucinations, psychotic features requiring 4 point restraints and ativan   Improved 1/19    Uncontrolled Hypertension               PRN Hydralazine, will most likely need Chronic treatment               clonidine to 0.3 mg tid    Slowly improving      Depression:  Home effexor       Longstanding ongoing Tobacco use:               Patch prn               Ativan prn     ENOC: resolved     Intermittent tachycardia to 150:  EKG with Sinus tach:  No history of same.  ? Etiology. Most likely due to illness and substance withdrawal               Lopressor prn    Echo above                  Hyponatremia: replace and recheck: Most likely due to volume depletion:  Improved               Continue to hydrate      Hyperglycemia:  A1C: 5.8              Due to disease process               Continue to monitor      Thrombocytopenia:  ? Splenic sequestration              Has not been on any type of OAC              Stable- cbc daily     Hypokalemia:  Replace and recheck    Familial dysfunction:  Patient unemployed, lives with parents.   Both parents hypervigilant and enabling     Discussed plan of care with Dr. Sreekanth Joshi,   Signed By: Missy Lomax, NP-C    January 20, 2020

## 2020-01-21 LAB
ALBUMIN SERPL-MCNC: 1.9 G/DL (ref 3.5–5)
ALBUMIN/GLOB SERPL: 0.5 {RATIO} (ref 1.2–3.5)
ALP SERPL-CCNC: 86 U/L (ref 50–136)
ALT SERPL-CCNC: 35 U/L (ref 12–65)
ANION GAP SERPL CALC-SCNC: 6 MMOL/L (ref 7–16)
AST SERPL-CCNC: 22 U/L (ref 15–37)
BILIRUB SERPL-MCNC: 0.7 MG/DL (ref 0.2–1.1)
BUN SERPL-MCNC: 3 MG/DL (ref 6–23)
CALCIUM SERPL-MCNC: 8.8 MG/DL (ref 8.3–10.4)
CHLORIDE SERPL-SCNC: 106 MMOL/L (ref 98–107)
CHOLEST SERPL-MCNC: 133 MG/DL
CO2 SERPL-SCNC: 30 MMOL/L (ref 21–32)
CREAT SERPL-MCNC: 0.61 MG/DL (ref 0.8–1.5)
ERYTHROCYTE [DISTWIDTH] IN BLOOD BY AUTOMATED COUNT: 13.2 % (ref 11.9–14.6)
GLOBULIN SER CALC-MCNC: 3.5 G/DL (ref 2.3–3.5)
GLUCOSE SERPL-MCNC: 91 MG/DL (ref 65–100)
HCT VFR BLD AUTO: 29.7 % (ref 41.1–50.3)
HDLC SERPL-MCNC: 29 MG/DL (ref 40–60)
HDLC SERPL: 4.6 {RATIO}
HGB BLD-MCNC: 9.8 G/DL (ref 13.6–17.2)
LDLC SERPL CALC-MCNC: 81.6 MG/DL
LIPID PROFILE,FLP: ABNORMAL
MCH RBC QN AUTO: 31.9 PG (ref 26.1–32.9)
MCHC RBC AUTO-ENTMCNC: 33 G/DL (ref 31.4–35)
MCV RBC AUTO: 96.7 FL (ref 79.6–97.8)
NRBC # BLD: 0 K/UL (ref 0–0.2)
PLATELET # BLD AUTO: 158 K/UL (ref 150–450)
PMV BLD AUTO: 9.4 FL (ref 9.4–12.3)
POTASSIUM SERPL-SCNC: 3.6 MMOL/L (ref 3.5–5.1)
PROT SERPL-MCNC: 5.4 G/DL (ref 6.3–8.2)
RBC # BLD AUTO: 3.07 M/UL (ref 4.23–5.6)
SODIUM SERPL-SCNC: 142 MMOL/L (ref 136–145)
TRIGL SERPL-MCNC: 112 MG/DL (ref 35–150)
VLDLC SERPL CALC-MCNC: 22.4 MG/DL (ref 6–23)
WBC # BLD AUTO: 5.9 K/UL (ref 4.3–11.1)

## 2020-01-21 PROCEDURE — 74011250637 HC RX REV CODE- 250/637: Performed by: INTERNAL MEDICINE

## 2020-01-21 PROCEDURE — 74011250636 HC RX REV CODE- 250/636: Performed by: INTERNAL MEDICINE

## 2020-01-21 PROCEDURE — 74011250637 HC RX REV CODE- 250/637: Performed by: NURSE PRACTITIONER

## 2020-01-21 PROCEDURE — 74011250636 HC RX REV CODE- 250/636: Performed by: NURSE PRACTITIONER

## 2020-01-21 PROCEDURE — 36415 COLL VENOUS BLD VENIPUNCTURE: CPT

## 2020-01-21 PROCEDURE — 74011000258 HC RX REV CODE- 258: Performed by: NURSE PRACTITIONER

## 2020-01-21 PROCEDURE — 77030020263 HC SOL INJ SOD CL0.9% LFCR 1000ML

## 2020-01-21 PROCEDURE — 80053 COMPREHEN METABOLIC PANEL: CPT

## 2020-01-21 PROCEDURE — 74011250637 HC RX REV CODE- 250/637: Performed by: HOSPITALIST

## 2020-01-21 PROCEDURE — 80061 LIPID PANEL: CPT

## 2020-01-21 PROCEDURE — 85027 COMPLETE CBC AUTOMATED: CPT

## 2020-01-21 PROCEDURE — 74011250636 HC RX REV CODE- 250/636: Performed by: HOSPITALIST

## 2020-01-21 PROCEDURE — 65660000000 HC RM CCU STEPDOWN

## 2020-01-21 RX ORDER — CHLORDIAZEPOXIDE HYDROCHLORIDE 25 MG/1
25 CAPSULE, GELATIN COATED ORAL 2 TIMES DAILY
Status: DISCONTINUED | OUTPATIENT
Start: 2020-01-21 | End: 2020-01-22 | Stop reason: HOSPADM

## 2020-01-21 RX ORDER — CHLORDIAZEPOXIDE HYDROCHLORIDE 25 MG/1
25 CAPSULE, GELATIN COATED ORAL DAILY
Status: DISCONTINUED | OUTPATIENT
Start: 2020-01-23 | End: 2020-01-22 | Stop reason: HOSPADM

## 2020-01-21 RX ADMIN — LORAZEPAM 2 MG: 2 INJECTION INTRAMUSCULAR; INTRAVENOUS at 04:41

## 2020-01-21 RX ADMIN — PIPERACILLIN AND TAZOBACTAM 4.5 G: 4; .5 INJECTION, POWDER, FOR SOLUTION INTRAVENOUS at 02:40

## 2020-01-21 RX ADMIN — Medication 10 ML: at 04:52

## 2020-01-21 RX ADMIN — CLONIDINE HYDROCHLORIDE 0.3 MG: 0.1 TABLET ORAL at 08:44

## 2020-01-21 RX ADMIN — LORAZEPAM 2 MG: 2 INJECTION INTRAMUSCULAR; INTRAVENOUS at 22:04

## 2020-01-21 RX ADMIN — HYDROMORPHONE HYDROCHLORIDE 1 MG: 1 INJECTION, SOLUTION INTRAMUSCULAR; INTRAVENOUS; SUBCUTANEOUS at 18:56

## 2020-01-21 RX ADMIN — DICYCLOMINE HYDROCHLORIDE 10 MG: 10 CAPSULE ORAL at 22:04

## 2020-01-21 RX ADMIN — VENLAFAXINE HYDROCHLORIDE 150 MG: 75 CAPSULE, EXTENDED RELEASE ORAL at 08:43

## 2020-01-21 RX ADMIN — DICYCLOMINE HYDROCHLORIDE 10 MG: 10 CAPSULE ORAL at 17:01

## 2020-01-21 RX ADMIN — DICYCLOMINE HYDROCHLORIDE 10 MG: 10 CAPSULE ORAL at 08:43

## 2020-01-21 RX ADMIN — Medication 100 MG: at 08:43

## 2020-01-21 RX ADMIN — CHLORDIAZEPOXIDE HYDROCHLORIDE 25 MG: 25 CAPSULE ORAL at 08:43

## 2020-01-21 RX ADMIN — Medication 10 ML: at 22:04

## 2020-01-21 RX ADMIN — DICYCLOMINE HYDROCHLORIDE 10 MG: 10 CAPSULE ORAL at 13:44

## 2020-01-21 RX ADMIN — CLONIDINE HYDROCHLORIDE 0.3 MG: 0.1 TABLET ORAL at 17:01

## 2020-01-21 RX ADMIN — Medication 10 ML: at 13:45

## 2020-01-21 RX ADMIN — CLONIDINE HYDROCHLORIDE 0.3 MG: 0.1 TABLET ORAL at 22:04

## 2020-01-21 RX ADMIN — SODIUM CHLORIDE 75 ML/HR: 900 INJECTION, SOLUTION INTRAVENOUS at 07:08

## 2020-01-21 RX ADMIN — CHLORDIAZEPOXIDE HYDROCHLORIDE 25 MG: 25 CAPSULE ORAL at 17:01

## 2020-01-21 NOTE — PROGRESS NOTES
Hospitalist Progress Note     Admit Date:  2020 12:33 PM   Name:  Megan Navarro   Age:  45 y.o.  :  1975   MRN:  [id number]   PCP:  None  Treatment Team: Attending Provider: Ella Hines MD; Care Manager: Kenya Talbot RN; Hospitalist: Nayla Grier NP    Subjective:   Patient is a 44 yo male with PMH of  Alcoholism, suboxone use, polysubstance use, and depression who was admitted through the ER on 2020 with reports of abdominal pain x 24 hours and diaphoresis. In ER BP was 170/117 with heart rate of 135.  LFTs were elevated,  lipase of 2180, Na 127, and Creatinine 1.67. Patient admitted with pancreatitis. GI consulted. CT with acute pancreatitis and mild ascites.  MRCP with evidence of necrotizing pancreatitis with no biliary dilation.  [address] elevated. Patient placed on CIWA protocol. Patient experiencing withdraw with reported episode of  Code Estel Budge called. Patient was placed in \" 4 point restraints attempting to get up, cursing, spitting, and striking out at parents and staff. Asaf Lowing at times\", started on librium.  patient seen examined at bedside. Family is in the room. He says he is feeling much better and is tolerating his full liquid diet. He complains of some mild back pain likely due to the bed. I encouraged him to walk around as tolerated. I informed him and his family today that we will discontinue antibiotics and IV fluids as he no longer needs them. Nursing notes and chart reviewed. Review of Systems negative with exception of pertinent positives noted above.       Objective:     Patient Vitals for the past 24 hrs:   Temp Pulse Resp BP SpO2   20 0733 98 °F (36.7 °C) 83 20 158/88 94 %   20 0421 98.4 °F (36.9 °C) 81 20 (!) 152/97 95 %   20 2347 98.6 °F (37 °C) 80 20 (!) 142/91 94 %   20 2005 97.9 °F (36.6 °C) 81 20 (!) 140/92 96 %   20 1544 98.4 °F (36.9 °C) 91 20 130/83 96 %   20 1117 98.2 °F (36.8 °C) 96 20 (!) 157/99 96 %     Oxygen Therapy  O2 Sat (%): 94 % (01/21/20 0733)  Pulse via Oximetry: 123 beats per minute (01/14/20 1729)  O2 Device: Room air (01/18/20 0038)    Intake/Output Summary (Last 24 hours) at 1/21/2020 0924  Last data filed at 1/21/2020 0703  Gross per 24 hour   Intake 2063.74 ml   Output 7450 ml   Net -5386.26 ml         General:    Well nourished. Alert to person place time and situation. CV:   RRR. No murmur, rub, or gallop. Lungs:   Poor air movement. No wheezing, rhonchi, or rales. Abdomen:   Soft, nontender, nondistended. No Moreira Saxena's or Jadiel sign  Extremities: Warm and dry. No cyanosis. Bilateral pitting edema, +2 to the knees  Skin:     No rashes or jaundice. Data Review:  I have reviewed all labs, meds, telemetry events, and studies from the last 24 hours. Recent Results (from the past 24 hour(s))   CBC W/O DIFF    Collection Time: 01/21/20  5:36 AM   Result Value Ref Range    WBC 5.9 4.3 - 11.1 K/uL    RBC 3.07 (L) 4.23 - 5.6 M/uL    HGB 9.8 (L) 13.6 - 17.2 g/dL    HCT 29.7 (L) 41.1 - 50.3 %    MCV 96.7 79.6 - 97.8 FL    MCH 31.9 26.1 - 32.9 PG    MCHC 33.0 31.4 - 35.0 g/dL    RDW 13.2 11.9 - 14.6 %    PLATELET 575 309 - 440 K/uL    MPV 9.4 9.4 - 12.3 FL    ABSOLUTE NRBC 0.00 0.0 - 0.2 K/uL   METABOLIC PANEL, COMPREHENSIVE    Collection Time: 01/21/20  5:36 AM   Result Value Ref Range    Sodium 142 136 - 145 mmol/L    Potassium 3.6 3.5 - 5.1 mmol/L    Chloride 106 98 - 107 mmol/L    CO2 30 21 - 32 mmol/L    Anion gap 6 (L) 7 - 16 mmol/L    Glucose 91 65 - 100 mg/dL    BUN 3 (L) 6 - 23 MG/DL    Creatinine 0.61 (L) 0.8 - 1.5 MG/DL    GFR est AA >60 >60 ml/min/1.73m2    GFR est non-AA >60 >60 ml/min/1.73m2    Calcium 8.8 8.3 - 10.4 MG/DL    Bilirubin, total 0.7 0.2 - 1.1 MG/DL    ALT (SGPT) 35 12 - 65 U/L    AST (SGOT) 22 15 - 37 U/L    Alk.  phosphatase 86 50 - 136 U/L    Protein, total 5.4 (L) 6.3 - 8.2 g/dL    Albumin 1.9 (L) 3.5 - 5.0 g/dL    Globulin 3.5 2.3 - 3.5 g/dL    A-G Ratio 0.5 (L) 1.2 - 3.5     LIPID PANEL    Collection Time: 01/21/20  5:36 AM   Result Value Ref Range    LIPID PROFILE          Cholesterol, total 133 <200 MG/DL    Triglyceride 112 35 - 150 MG/DL    HDL Cholesterol 29 (L) 40 - 60 MG/DL    LDL, calculated 81.6 <100 MG/DL    VLDL, calculated 22.4 6.0 - 23.0 MG/DL    CHOL/HDL Ratio 4.6          All Micro Results     Procedure Component Value Units Date/Time    CULTURE, URINE [[id number]] Collected:  01/16/20 1300    Order Status:  Completed Specimen:  Urine from Clean catch Updated:  01/18/20 0732     Special Requests: NO SPECIAL REQUESTS        Culture result: NO GROWTH 2 DAYS       CULTURE, BLOOD [[id number]]     Order Status:  Canceled Specimen:  Blood     CULTURE, BLOOD [[id number]]     Order Status:  Canceled Specimen:  Blood     CULTURE, URINE [[id number]]     Order Status:  Canceled Specimen:  Urine from Clean catch           Current Meds:  Current Facility-Administered Medications   Medication Dose Route Frequency    chlordiazePOXIDE (LIBRIUM) capsule 25 mg  25 mg Oral BID    LORazepam (ATIVAN) injection 2 mg  2 mg IntraVENous Q2H PRN    cloNIDine HCL (CATAPRES) tablet 0.3 mg  0.3 mg Oral TID    thiamine HCL (B-1) tablet 100 mg  100 mg Oral DAILY    bisacodyL (DULCOLAX) suppository 10 mg  10 mg Rectal DAILY PRN    dicyclomine (BENTYL) capsule 10 mg  10 mg Oral QID    sodium chloride (NS) flush 5-40 mL  5-40 mL IntraVENous Q8H    sodium chloride (NS) flush 5-40 mL  5-40 mL IntraVENous PRN    HYDROmorphone (PF) (DILAUDID) injection 1 mg  1 mg IntraVENous Q4H PRN    naloxone (NARCAN) injection 0.4 mg  0.4 mg IntraVENous PRN    ondansetron (ZOFRAN) injection 4 mg  4 mg IntraVENous Q4H PRN    nicotine (NICODERM CQ) 21 mg/24 hr patch 1 Patch  1 Patch TransDERmal Q24H    hydrALAZINE (APRESOLINE) 20 mg/mL injection 10 mg  10 mg IntraVENous Q6H PRN    venlafaxine-SR (EFFEXOR-XR) capsule 150 mg  150 mg Oral DAILY WITH BREAKFAST    albuterol (PROVENTIL VENTOLIN) nebulizer solution 2.5 mg  2.5 mg Nebulization Q4H PRN    metoprolol (LOPRESSOR) injection 5 mg  5 mg IntraVENous Q4H PRN       Other Studies (last 24 hours):  No results found. Assessment and Plan:     Hospital Problems as of 1/21/2020 Never Reviewed          Codes Class Noted - Resolved POA    * (Principal) Pancreatitis ICD-10-CM: K85.90  ICD-9-CM: 495.2  1/14/2020 - Present Yes        Chronic pain (Chronic) ICD-10-CM: C98.71  ICD-9-CM: 338.29  1/14/2020 - Present Yes        Alcohol abuse (Chronic) ICD-10-CM: F10.10  ICD-9-CM: 305.00  1/14/2020 - Present Yes        Hypertension ICD-10-CM: I10  ICD-9-CM: 401.9  1/14/2020 - Present Yes        Depression (Chronic) ICD-10-CM: F32.9  ICD-9-CM: 961  1/14/2020 - Present Yes        Tobacco use (Chronic) ICD-10-CM: Z72.0  ICD-9-CM: 305.1  1/14/2020 - Present Yes        ENOC (acute kidney injury) ([facility] [address] ICD-10-CM: N17.9  ICD-9-CM: 584.9  1/14/2020 - Present Yes        Hyponatremia ICD-10-CM: E87.1  ICD-9-CM: 276.1  1/14/2020 - Present Yes        Hyperglycemia ICD-10-CM: R73.9  ICD-9-CM: 790.29  1/14/2020 - Present Yes              PLAN:    Acute alcoholic pancreatitis, improving, first episode: Concern for necrotizing pancreatitis   Lipid panel checked and triglycerides within normal limits.             Daily labs              Monitor pain and nausea              Tolerating full liquids, advance as tolerated              Stop IV fluids    Stop antibiotics              Bentyl ordered for cramping               GI on board and recommendations appreciated.   No longer requiring antibiotics.     Elevated LFTs: Resolved     Polysubstance abuse:  Attends suboxone clinic, last dose 1/13              Follow up on discharge     Acute UTI:  Culture with NGTD              Asymptomatic and will no longer continue antibiotics     Chronic pain on suboxone              Continue narcs for pain, avoid tylenol     Alcohol abuse: Monitor for withdrawls              Ativan prn              Scheduled librium begun 1/18      Acute alcohol withdrawls 1/17-1/18 with hallucinations, psychotic features requiring 4 point restraints and ativan              Librium taper. 25 mg twice daily for 2 days (1/21-1/22), 25 mg daily for 2 days (1/23-1/24) then discontinue     Uncontrolled Hypertension               PRN Hydralazine, will most likely need Chronic treatment               clonidine to 0.3 mg tid               Slowly improving      Depression:  Home effexor       Longstanding ongoing Tobacco use:               Patch prn               Ativan prn     ENOC: resolved     Intermittent tachycardia to 150:  EKG with Sinus tach:  No history of same.  ? Etiology. Most likely due to illness and substance withdrawal               Lopressor prn                   Hyponatremia: Resolved      Hyperglycemia:  A1C: 5.8              Due to disease process               Continue to monitor      Thrombocytopenia: Resolved    Hypokalemia:  Replace and recheck     Familial dysfunction:  Patient unemployed, lives with parents.   Both parents hypervigilant and enabling     DC planning/Dispo: Home when stable  DVT ppx: SCDs    Signed:  Dhruv Victor MD

## 2020-01-21 NOTE — PROGRESS NOTES
Spiritual Care Visit, initial visit. Visited with patient and his father at bedside. Patient did not respond to . His father stated that everything was alright.  was somewhat puzzled by the situation. Visit by Marylen Cruz Marny [place]oka, Staff .  BELA.Aaron., Th.B., B.A.

## 2020-01-22 VITALS
HEART RATE: 87 BPM | SYSTOLIC BLOOD PRESSURE: 144 MMHG | OXYGEN SATURATION: 97 % | DIASTOLIC BLOOD PRESSURE: 97 MMHG | WEIGHT: 241 LBS | BODY MASS INDEX: 31.8 KG/M2 | RESPIRATION RATE: 18 BRPM | TEMPERATURE: 98.5 F

## 2020-01-22 LAB
ALBUMIN SERPL-MCNC: 2.2 G/DL (ref 3.5–5)
ALBUMIN/GLOB SERPL: 0.6 {RATIO} (ref 1.2–3.5)
ALP SERPL-CCNC: 93 U/L (ref 50–136)
ALT SERPL-CCNC: 36 U/L (ref 12–65)
ANION GAP SERPL CALC-SCNC: 6 MMOL/L (ref 7–16)
AST SERPL-CCNC: 23 U/L (ref 15–37)
BILIRUB SERPL-MCNC: 0.5 MG/DL (ref 0.2–1.1)
BUN SERPL-MCNC: 4 MG/DL (ref 6–23)
CALCIUM SERPL-MCNC: 9.4 MG/DL (ref 8.3–10.4)
CHLORIDE SERPL-SCNC: 103 MMOL/L (ref 98–107)
CO2 SERPL-SCNC: 30 MMOL/L (ref 21–32)
CREAT SERPL-MCNC: 0.72 MG/DL (ref 0.8–1.5)
ERYTHROCYTE [DISTWIDTH] IN BLOOD BY AUTOMATED COUNT: 13.3 % (ref 11.9–14.6)
GLOBULIN SER CALC-MCNC: 4 G/DL (ref 2.3–3.5)
GLUCOSE SERPL-MCNC: 120 MG/DL (ref 65–100)
HCT VFR BLD AUTO: 33.2 % (ref 41.1–50.3)
HGB BLD-MCNC: 10.7 G/DL (ref 13.6–17.2)
MCH RBC QN AUTO: 31.8 PG (ref 26.1–32.9)
MCHC RBC AUTO-ENTMCNC: 32.2 G/DL (ref 31.4–35)
MCV RBC AUTO: 98.5 FL (ref 79.6–97.8)
NRBC # BLD: 0 K/UL (ref 0–0.2)
PLATELET # BLD AUTO: 211 K/UL (ref 150–450)
PMV BLD AUTO: 9.5 FL (ref 9.4–12.3)
POTASSIUM SERPL-SCNC: 4.1 MMOL/L (ref 3.5–5.1)
PROT SERPL-MCNC: 6.2 G/DL (ref 6.3–8.2)
RBC # BLD AUTO: 3.37 M/UL (ref 4.23–5.6)
SODIUM SERPL-SCNC: 139 MMOL/L (ref 136–145)
WBC # BLD AUTO: 6.4 K/UL (ref 4.3–11.1)

## 2020-01-22 PROCEDURE — 36415 COLL VENOUS BLD VENIPUNCTURE: CPT

## 2020-01-22 PROCEDURE — 74011250637 HC RX REV CODE- 250/637: Performed by: HOSPITALIST

## 2020-01-22 PROCEDURE — 74011250637 HC RX REV CODE- 250/637: Performed by: NURSE PRACTITIONER

## 2020-01-22 PROCEDURE — 74011250636 HC RX REV CODE- 250/636: Performed by: INTERNAL MEDICINE

## 2020-01-22 PROCEDURE — 74011250637 HC RX REV CODE- 250/637: Performed by: INTERNAL MEDICINE

## 2020-01-22 PROCEDURE — 74011250636 HC RX REV CODE- 250/636: Performed by: HOSPITALIST

## 2020-01-22 PROCEDURE — 80053 COMPREHEN METABOLIC PANEL: CPT

## 2020-01-22 PROCEDURE — 85027 COMPLETE CBC AUTOMATED: CPT

## 2020-01-22 RX ORDER — LANOLIN ALCOHOL/MO/W.PET/CERES
100 CREAM (GRAM) TOPICAL DAILY
Qty: 30 TAB | Refills: 0 | Status: SHIPPED | OUTPATIENT
Start: 2020-01-23 | End: 2020-02-22

## 2020-01-22 RX ORDER — LORAZEPAM 2 MG/ML
1 INJECTION INTRAMUSCULAR ONCE
Status: COMPLETED | OUTPATIENT
Start: 2020-01-22 | End: 2020-01-22

## 2020-01-22 RX ORDER — CLONIDINE HYDROCHLORIDE 0.1 MG/1
TABLET ORAL
Qty: 12 TAB | Refills: 0 | Status: SHIPPED | OUTPATIENT
Start: 2020-01-23 | End: 2020-01-27

## 2020-01-22 RX ORDER — CHLORDIAZEPOXIDE HYDROCHLORIDE 25 MG/1
25 CAPSULE, GELATIN COATED ORAL DAILY
Qty: 2 CAP | Refills: 0 | Status: SHIPPED | OUTPATIENT
Start: 2020-01-23 | End: 2020-01-25

## 2020-01-22 RX ORDER — CHLORDIAZEPOXIDE HYDROCHLORIDE 25 MG/1
25 CAPSULE, GELATIN COATED ORAL 2 TIMES DAILY
Qty: 1 CAP | Refills: 0 | Status: SHIPPED | OUTPATIENT
Start: 2020-01-22 | End: 2020-01-23

## 2020-01-22 RX ORDER — LORAZEPAM 2 MG/ML
0.5 INJECTION INTRAMUSCULAR
Status: DISCONTINUED | OUTPATIENT
Start: 2020-01-22 | End: 2020-01-22 | Stop reason: HOSPADM

## 2020-01-22 RX ADMIN — VENLAFAXINE HYDROCHLORIDE 150 MG: 75 CAPSULE, EXTENDED RELEASE ORAL at 09:22

## 2020-01-22 RX ADMIN — CLONIDINE HYDROCHLORIDE 0.3 MG: 0.1 TABLET ORAL at 09:22

## 2020-01-22 RX ADMIN — LORAZEPAM 2 MG: 2 INJECTION INTRAMUSCULAR; INTRAVENOUS at 04:05

## 2020-01-22 RX ADMIN — Medication 100 MG: at 09:22

## 2020-01-22 RX ADMIN — LORAZEPAM 1 MG: 2 INJECTION INTRAMUSCULAR; INTRAVENOUS at 05:47

## 2020-01-22 RX ADMIN — CHLORDIAZEPOXIDE HYDROCHLORIDE 25 MG: 25 CAPSULE ORAL at 09:22

## 2020-01-22 RX ADMIN — Medication 10 ML: at 05:04

## 2020-01-22 RX ADMIN — Medication 10 ML: at 13:16

## 2020-01-22 RX ADMIN — DICYCLOMINE HYDROCHLORIDE 10 MG: 10 CAPSULE ORAL at 09:22

## 2020-01-22 RX ADMIN — DICYCLOMINE HYDROCHLORIDE 10 MG: 10 CAPSULE ORAL at 13:16

## 2020-01-22 NOTE — DISCHARGE SUMMARY
Hospitalist Discharge Summary     Admit Date:  2020 12:33 PM   Name:  Hiral Hawk   Age:  45 y.o.  :  1975   MRN:  [id number]   PCP:  None  Treatment Team: Attending Provider: Gregg Bocanegra MD; Care Manager: Florentino Salmeron RN; Hospitalist: Camilo Henning NP    Problem List for this Hospitalization:  Hospital Problems as of 2020 Never Reviewed          Codes Class Noted - Resolved POA    * (Principal) Pancreatitis ICD-10-CM: K85.90  ICD-9-CM: 318.3  2020 - Present Yes        Chronic pain (Chronic) ICD-10-CM: C47.33  ICD-9-CM: 338.29  2020 - Present Yes        Alcohol abuse (Chronic) ICD-10-CM: F10.10  ICD-9-CM: 305.00  2020 - Present Yes        Hypertension ICD-10-CM: I10  ICD-9-CM: 401.9  2020 - Present Yes        Depression (Chronic) ICD-10-CM: F32.9  ICD-9-CM: 559  2020 - Present Yes        Tobacco use (Chronic) ICD-10-CM: Z72.0  ICD-9-CM: 305.1  2020 - Present Yes        ENOC (acute kidney injury) ([facility] [address] ICD-10-CM: N17.9  ICD-9-CM: 584.9  2020 - Present Yes        Hyponatremia ICD-10-CM: E87.1  ICD-9-CM: 276.1  2020 - Present Yes        Hyperglycemia ICD-10-CM: R73.9  ICD-9-CM: 790.29  2020 - Present Yes                Admission HPI from 2020:    \" Patient is a 44 yo male with PMH of  Alcoholism, suboxone use, polysubstance use, and depression who was admitted through the ER on 2020 with reports of abdominal pain x 24 hours and diaphoresis. In ER BP was 170/117 with heart rate of 135.  LFTs were elevated,  lipase of 2180, CC 433, and Creatinine 1.67. Patient admitted with pancreatitis. GI consulted. CT with acute pancreatitis and mild ascites.  MRCP with evidence of necrotizing pancreatitis with no biliary dilation. [address] elevated. Patient placed on CIWA protocol. Patient experiencing withdraw with reported episode of Jeet Kevin called.  Patient was placed in \" 4 point restraints attempting to get up, cursing, spitting, and striking out at parents and staff. Isaias Martinez at times\", started on librium. \"    Hospital Course:  He was admitted to the general medical floor for abdominal pain and diaphoresis. It was found that he had alcoholic pancreatitis. Gastroenterology was consulted and they confirmed the same. He was given intravenous fluids kept n.p.o. and monitor for alcohol throughout his stay. His stay was complicated by alcohol withdrawal requiring large doses of Ativan to prevent him from going into delirium tremens. His diet was slowly advanced and he tolerated a regular diet on the day of discharge. He tolerated it well. He will be sent home on a short course of Librium taper along with clonidine. He is to establish care with a primary physician. He is to abstain from alcohol. He will resume his Suboxone treatment. He is hemodynamically stable for discharge to home. Follow up instructions below. Plan was discussed with patient and father. All questions answered. Patient was stable at time of discharge and was instructed to call or return if there are any concerns or recurrence of symptoms. Diagnostic Imaging/Tests:   Mri Abd Wo Cont    Result Date: 1/15/2020  Examination: MRI abdomen without contrast, MRCP protocol Indication: pancreatitis, elevated LFT, 45 years Male 46 y/o here for pancreatitis, elevated LFT. No hx of surgery or cancer Comparison: CT abdomen pelvis January 14, 2020, ultrasound abdomen. Technique:  Multiplanar, multisequence MR imaging was performed of the abdomen without intravenous contrast administration. 3-dimensional MRCP was performed using maximum intensity projection reconstruction. Findings: Persistent trace left pleural effusion. The liver is unremarkable in contour without evidence of T2 hyperintense lesion. There is diffuse loss of signal of the liver parenchyma on out of phase imaging, consistent with hepatic steatosis.   Normal-appearing intrahepatic and extrahepatic ducts. No definite filling defect is seen in the common bile duct to suggest MR evidence of choledocholithiasis. Normal-appearing pancreatic duct. Visualized hepatic venous and portal venous branches appear patent. The gallbladder, spleen, adrenal glands, and kidneys are unremarkable. Again visualized is mild inflammatory fat stranding surrounding the pancreas with small volume free fluid and ascites, similar to appearance on recent CT, consistent with acute pancreatitis. No definite evidence of walled off fluid collection to suggest pseudocyst formation at this time. There does appear to be relative T1 hypointensity of the pancreatic head neck and body on T1-weighted imaging, this may indicate necrotizing pancreatitis, although not fully evaluated here on this noncontrast MRI. No evidence of significant lymphadenopathy. The aorta, IVC, portal veins, and hepatic veins appear patent. The visualized small and large bowel appear unremarkable. No evidence of aggressive osseous lesion. IMPRESSION: 1. Persistent acute pancreatitis without MR evidence of choledocholithiasis. Findings suspicious for necrotizing pancreatitis involving the head neck and body of the pancreas, although not fully evaluated on this noncontrast MRI. 2.  Other chronic findings as above. Ct Abd Pelv Wo Cont    Result Date: 1/14/2020  CT OF THE ABDOMEN AND PELVIS INDICATION:  Pancreatitis and abdominal pain Multiple axial images were obtained through the abdomen and pelvis without IV contrast.  Oral contrast was used for bowel opacification. Radiation dose reduction techniques were used for this study: All CT scans performed at this facility use one or all of the following: Automated exposure control, adjustment of the mA and/or kVp according to patient's size, iterative reconstruction. COMPARISON: None FINDINGS: -LUNG BASES: There is a tiny left pleural effusion. Lung bases are otherwise clear.  -LIVER: Mild fatty infiltration. No discrete liver mass. -GALLBLADDER/BILE DUCTS: No gallstones or bile duct dilatation. -PANCREAS: There is diffuse inflammation of the pancreas consistent with acute pancreatitis. There is peripancreatic edema. There is no definite pseudocyst or abscess. -SPLEEN: Normal. -ADRENALS: Normal. -KIDNEYS/URETERS: No hydronephrosis or significant mass. -BLADDER: Normal. -REPRODUCTIVE ORGANS: No pelvic masses. -BOWEL: Mild gastric and colon distention, likely ileus. -LYMPH NODES: No significant retroperitoneal, mesenteric, or pelvic adenopathy. -BONES: No fracture or significant bone lesion. -VASCULATURE: Normal -OTHER: Mild ascites, most prominent in the pelvis. IMPRESSION: Acute pancreatitis and mild ascites     [employer]    Result Date: 1/14/2020  Right upper quadrant ultrasound: 01/14/2020 HISTORY: Moderate epigastric pain x7 days. Pancreatitis. . Real-time sonography of the right upper quadrant was performed. Comparison: None FINDINGS: There is increased echotexture throughout the liver. There is no intrahepatic biliary ductal dilatation. There is no focal hepatic mass. The gallbladder is anechoic without evidence of cholelithiasis. There is no gallbladder wall thickening or pericholecystic fluid to suggest acute cholecystitis. The common bile duct is normal  measuring 4 mm. The pancreas is unable to be visualized as it was obscured by overlying bowel gas. The right kidney is unremarkable without hydronephrosis or solid mass. The right kidney was measured at approximately 12.9 cm. The visualized portions of the abdominal aorta and inferior vena cava are unremarkable. There is normal hepatopetal flow within the main portal vein. No free fluid is seen within the right upper quadrant. IMPRESSION:  1. Hepatic steatosis. 2. Nonvisualization of the pancreas due to overlying bowel gas.        Echocardiogram results:  Results for orders placed or performed during the hospital encounter of 20   2D ECHO COMPLETE ADULT (TTE) W OR WO CONTR    Narrative    Nayeli Carroll [address] [facility], [address] [facility]  [phone]    Transthoracic Echocardiogram  2D, M-mode, Doppler, and Color Doppler    Patient: Ana Maria Lyon  MR #: [id number]  : [id number]  Age: 45 years  Gender: Male  Study date: 2020  Account #: [de-identified]  Height: 73 in  Weight: 239.6 lb  BSA: 2.33 mï¾²  Status:Routine  Location: 205  BP: 135/ 96    Allergies: NO KNOWN ALLERGENS    Sonographer:  Jared Zavaleta RDCS  Group:  [id number] Cardiology  Referring Physician:  LYNN Strauss  Reading Physician:  Talia Pang MD    INDICATIONS: Persistent tachycardia, history of cocaine use    PROCEDURE: This was a routine study. A transthoracic echocardiogram was  performed. The study included complete 2D imaging, M-mode, complete spectral  Doppler, and color Doppler. Echocardiographic views were limited by poor  acoustic window availability. This was a technically difficult study. LEFT VENTRICLE: Size was normal. Systolic function was normal. Ejection  fraction was estimated in the range of 55 % to 60 %. There were no regional  wall motion abnormalities. Wall thickness was mildly increased. Left  ventricular diastolic function parameters were normal. E/e' av.55. RIGHT VENTRICLE: The size was normal. Systolic function was normal.    LEFT ATRIUM: Size was normal.    RIGHT ATRIUM: Size was normal.    SYSTEMIC VEINS: IVC: The inferior vena cava was not well visualized. AORTIC VALVE: The valve was structurally normal, tri-commissural. There was   no  evidence for stenosis. There was no insufficiency. MITRAL VALVE: Valve structure was normal. There was no evidence for stenosis. There was no regurgitation. TRICUSPID VALVE: The valve structure was normal. There was no evidence for  stenosis. There was no regurgitation.     PULMONIC VALVE: The valve structure was normal. There was no evidence for  stenosis. There was no insufficiency. PERICARDIUM: There was no pericardial effusion. AORTA: The root exhibited normal size. SUMMARY:    -  Left ventricle: Systolic function was normal. Ejection fraction was  estimated in the range of 55 % to 60 %. There were no regional wall motion  abnormalities. Wall thickness was mildly increased. SYSTEM MEASUREMENT TABLES    2D mode  AoR Diam (2D): 3.6 cm  LA Dimension (2D): 3.7 cm  IVS/LVPW (2D): 0.9  IVSd (2D): 1.1 cm  LVIDd (2D): 4.6 cm  LVIDs (2D): 3 cm  LVOT Area (2D): 4.9 cm2  LVPWd (2D): 1.2 cm  RVIDd (2D): 3 cm    Unspecified Scan Mode  Peak Grad; Mean; Antegrade Flow: 5 mm[Hg]  Vmax;  Antegrade Flow: 110 cm/s  LVOT Diam: 2.5 cm    Prepared and signed by    Mattie Fox MD  Signed 16-Jan-2020 16:00:54           All Micro Results     Procedure Component Value Units Date/Time    CULTURE, URINE [[id number]] Collected:  01/16/20 1300    Order Status:  Completed Specimen:  Urine from Clean catch Updated:  01/18/20 0732     Special Requests: NO SPECIAL REQUESTS        Culture result: NO GROWTH 2 DAYS       CULTURE, BLOOD [[id number]]     Order Status:  Canceled Specimen:  Blood     CULTURE, BLOOD [[id number]]     Order Status:  Canceled Specimen:  Blood     CULTURE, URINE [[id number]]     Order Status:  Canceled Specimen:  Urine from Clean catch           Labs: Results:       BMP, Mg, Phos Recent Labs     01/22/20  0522 01/21/20  0536 01/20/20  0515    142 141   K 4.1 3.6 3.4*    106 106   CO2 30 30 28   AGAP 6* 6* 7   BUN 4* 3* 4*   CREA 0.72* 0.61* 0.60*   CA 9.4 8.8 8.5   * 91 96      CBC Recent Labs     01/22/20  0522 01/21/20  0536 01/20/20  0515   WBC 6.4 5.9 5.4   RBC 3.37* 3.07* 3.00*   HGB 10.7* 9.8* 9.7*   HCT 33.2* 29.7* 29.0*    158 114*      LFT Recent Labs     01/22/20  0522 01/21/20  0536 01/20/20  0515   SGOT 23 22 19   ALT 36 35 35   AP 93 86 83   TP 6.2* 5.4* 5.1*   ALB 2.2* 1.9* 1.8* GLOB 4.0* 3.5 3.3   AGRAT 0.6* 0.5* 0.5*      Cardiac Testing No results found for: BNPP, BNP, CPK, RCK1, RCK2, RCK3, RCK4, CKMB, CKNDX, CKND1, TROPT, TROIQ   Coagulation Tests Lab Results   Component Value Date/Time    Prothrombin time 12.4 01/16/2020 11:15 AM    Prothrombin time 12.4 01/14/2020 12:51 PM    INR 0.9 01/16/2020 11:15 AM    INR 0.9 01/14/2020 12:51 PM    aPTT 29.7 01/16/2020 11:15 AM      A1c Lab Results   Component Value Date/Time    Hemoglobin A1c 5.8 01/14/2020 07:14 PM      Lipid Panel Lab Results   Component Value Date/Time    Cholesterol, total 133 01/21/2020 05:36 AM    HDL Cholesterol 29 (L) 01/21/2020 05:36 AM    LDL, calculated 81.6 01/21/2020 05:36 AM    VLDL, calculated 22.4 01/21/2020 05:36 AM    Triglyceride 112 01/21/2020 05:36 AM    CHOL/HDL Ratio 4.6 01/21/2020 05:36 AM      Thyroid Panel No results found for: T4, T3U, TSH, TSHEXT     Most Recent UA Lab Results   Component Value Date/Time    Color ORANGE 01/14/2020 07:35 PM    Appearance CLOUDY 01/14/2020 07:35 PM    Specific gravity 1.022 01/14/2020 07:35 PM    pH (UA) 6.0 01/14/2020 07:35 PM    Protein 30 (A) 01/14/2020 07:35 PM    Glucose 100 01/14/2020 07:35 PM    Ketone TRACE (A) 01/14/2020 07:35 PM    Bilirubin SMALL (A) 01/14/2020 07:35 PM    Blood NEGATIVE  01/14/2020 07:35 PM    Urobilinogen 1.0 01/14/2020 07:35 PM    Nitrites POSITIVE (A) 01/14/2020 07:35 PM    Leukocyte Esterase SMALL (A) 01/14/2020 07:35 PM        No Known Allergies    There is no immunization history on file for this patient.     All Labs from Last 24 Hrs:  Recent Results (from the past 24 hour(s))   CBC W/O DIFF    Collection Time: 01/22/20  5:22 AM   Result Value Ref Range    WBC 6.4 4.3 - 11.1 K/uL    RBC 3.37 (L) 4.23 - 5.6 M/uL    HGB 10.7 (L) 13.6 - 17.2 g/dL    HCT 33.2 (L) 41.1 - 50.3 %    MCV 98.5 (H) 79.6 - 97.8 FL    MCH 31.8 26.1 - 32.9 PG    MCHC 32.2 31.4 - 35.0 g/dL    RDW 13.3 11.9 - 14.6 %    PLATELET 739 317 - 506 K/uL    MPV 9.5 9.4 - 12.3 FL    ABSOLUTE NRBC 0.00 0.0 - 0.2 K/uL   METABOLIC PANEL, COMPREHENSIVE    Collection Time: 01/22/20  5:22 AM   Result Value Ref Range    Sodium 139 136 - 145 mmol/L    Potassium 4.1 3.5 - 5.1 mmol/L    Chloride 103 98 - 107 mmol/L    CO2 30 21 - 32 mmol/L    Anion gap 6 (L) 7 - 16 mmol/L    Glucose 120 (H) 65 - 100 mg/dL    BUN 4 (L) 6 - 23 MG/DL    Creatinine 0.72 (L) 0.8 - 1.5 MG/DL    GFR est AA >60 >60 ml/min/1.73m2    GFR est non-AA >60 >60 ml/min/1.73m2    Calcium 9.4 8.3 - 10.4 MG/DL    Bilirubin, total 0.5 0.2 - 1.1 MG/DL    ALT (SGPT) 36 12 - 65 U/L    AST (SGOT) 23 15 - 37 U/L    Alk. phosphatase 93 50 - 136 U/L    Protein, total 6.2 (L) 6.3 - 8.2 g/dL    Albumin 2.2 (L) 3.5 - 5.0 g/dL    Globulin 4.0 (H) 2.3 - 3.5 g/dL    A-G Ratio 0.6 (L) 1.2 - 3.5         Discharge Exam:  Patient Vitals for the past 24 hrs:   Temp Pulse Resp BP SpO2   01/22/20 1118 98.5 °F (36.9 °C) 87 18 (!) 144/97 97 %   01/22/20 0742 98.1 °F (36.7 °C) 80 18 (!) 146/91 94 %   01/22/20 0356 98.2 °F (36.8 °C) 81 19 128/70 97 %   01/21/20 2325 97.8 °F (36.6 °C) 80 19 (!) 148/96 95 %   01/21/20 1539 98.3 °F (36.8 °C) 83 19 140/89 94 %     Oxygen Therapy  O2 Sat (%): 97 % (01/22/20 1118)  Pulse via Oximetry: 123 beats per minute (01/14/20 1729)  O2 Device: Room air (01/18/20 0038)    Intake/Output Summary (Last 24 hours) at 1/22/2020 1143  Last data filed at 1/22/2020 1118  Gross per 24 hour   Intake 250 ml   Output 5675 ml   Net -5425 ml       General:    Well nourished. Alert. No distress. Eyes:   Normal sclera. Extraocular movements intact. ENT:  Normocephalic, atraumatic. Moist mucous membranes  CV:   Regular rate and rhythm. No murmur, rub, or gallop. Lungs:  Clear to auscultation bilaterally. No wheezing, rhonchi, or rales. Abdomen: Soft, nontender, nondistended. Bowel sounds normal.   Extremities: Warm and dry. No cyanosis or edema. Neurologic: CN II-XII grossly intact. Sensation intact.   Skin:     No rashes or jaundice. Psych:  Normal mood and affect. Discharge Info:   Current Discharge Medication List      START taking these medications    Details   !! chlordiazePOXIDE (LIBRIUM) 25 mg capsule Take 1 Cap by mouth two (2) times a day for 1 dose. Max Daily Amount: 50 mg.  Qty: 1 Cap, Refills: 0    Associated Diagnoses: Alcohol abuse      !! chlordiazePOXIDE (LIBRIUM) 25 mg capsule Take 1 Cap by mouth daily for 2 days. Max Daily Amount: 25 mg.  Qty: 2 Cap, Refills: 0    Associated Diagnoses: Alcohol abuse      thiamine HCL (B-1) 100 mg tablet Take 1 Tab by mouth daily for 30 days. Qty: 30 Tab, Refills: 0      cloNIDine HCL (CATAPRES) 0.1 mg tablet Take 2 Tabs by mouth two (2) times a day for 2 days, THEN 1 Tab two (2) times a day for 2 days. Qty: 12 Tab, Refills: 0       !! - Potential duplicate medications found. Please discuss with provider. CONTINUE these medications which have NOT CHANGED    Details   venlafaxine-SR (EFFEXOR-XR) 150 mg capsule Take 150 mg by mouth daily. buprenorphine-naloxone (SUBOXONE) 8-2 mg film sublingaul film 1 Film by SubLINGual route two (2) times a day. Comments: . Disposition: home    Activity: Activity as tolerated  Diet: DIET NUTRITIONAL SUPPLEMENTS All Meals; Ensure Enlive ( )  DIET CARDIAC Regular    No orders of the defined types were placed in this encounter. Follow-up Information     Follow up With Specialties Details Why Contact Info    PCP  In 2 weeks Establish care. Hospital follow-up for pancreatitis and alcohol withdrawal           Time spent in patient discharge planning and coordination 35 minutes.     Signed:  Estefany Newberry MD

## 2020-01-22 NOTE — PROGRESS NOTES
Writer spoke with Dr. Shearer Party concering perfect serve message. New orders received. One time dose of Ativan 1mg IV.  Given

## 2020-01-22 NOTE — PROGRESS NOTES
65: Patient came to the nurses station stating he was agitated and could not sleep. Writer encouraged patient to walk around the halls and writer would get patient some medication. Patient did 3.5 laps in the hallway, returned to the room, voided. Medication given at [facility] 2: Patient again came to the nurses station. Writer asked what is wrong. Patient states \"I just can not get back to sleep and I just feel agitated. \" Writer encouraged patient to kind of make rounds in the hallway while contacting the Dr. Inman Rho:  Message sent to the hospitalist.

## 2020-01-22 NOTE — DISCHARGE INSTRUCTIONS
Pancreatitis: Care Instructions  Your Care Instructions    The pancreas is an organ behind the stomach. It makes hormones and enzymes to help your body digest food. But if these enzymes attack the pancreas, it can get inflamed. This is called pancreatitis. Most cases are caused by gallstones or by heavy alcohol use. If you take care of yourself at home, it will help you get better. It will also help you avoid more problems with your pancreas. Follow-up care is a key part of your treatment and safety. Be sure to make and go to all appointments, and call your doctor if you are having problems. It's also a good idea to know your test results and keep a list of the medicines you take. How can you care for yourself at home? · Drink clear liquids and eat bland foods until you feel better. Gibson foods include rice, dry toast, and crackers. They also include bananas and applesauce. · Eat a low-fat diet until your doctor says your pancreas is healed. · Do not drink alcohol. Tell your doctor if you need help to quit. Counseling, support groups, and sometimes medicines can help you stay sober. · Be safe with medicines. Read and follow all instructions on the label. ? If the doctor gave you a prescription medicine for pain, take it as prescribed. ? If you are not taking a prescription pain medicine, ask your doctor if you can take an over-the-counter medicine. · If your doctor prescribed antibiotics, take them as directed. Do not stop taking them just because you feel better. You need to take the full course of antibiotics. · Get extra rest until you feel better. To prevent future problems with your pancreas  · Do not drink alcohol. · Tell your doctors and pharmacist that you've had pancreatitis. They can help you avoid medicines that may cause this problem again. When should you call for help? Call 911 anytime you think you may need emergency care.  For example, call if:    · You vomit blood or what looks like coffee grounds.     · Your stools are maroon or very bloody.    Call your doctor now or seek immediate medical care if:    · You have new or worse belly pain.     · Your stools are black and look like tar, or they have streaks of blood.     · You are vomiting.    Watch closely for changes in your health, and be sure to contact your doctor if:    · You do not get better as expected. Where can you learn more? Go to http://roxana-glenn.info/. Enter T743 in the search box to learn more about \"Pancreatitis: Care Instructions. \"  Current as of: November 7, 2018  Content Version: 12.2  © 2055-3456 [employer]. Care instructions adapted under license by [employer] (which disclaims liability or warranty for this information). If you have questions about a medical condition or this instruction, always ask your healthcare professional. [place] [address] any warranty or liability for your use of this information. DISCHARGE SUMMARY from Nurse    PATIENT INSTRUCTIONS:    After general anesthesia or intravenous sedation, for 24 hours or while taking prescription Narcotics:  · Limit your activities  · Do not drive and operate hazardous machinery  · Do not make important personal or business decisions  · Do  not drink alcoholic beverages  · If you have not urinated within 8 hours after discharge, please contact your surgeon on call. Report the following to your surgeon:  · Excessive pain, swelling, redness or odor of or around the surgical area  · Temperature over 100.5  · Nausea and vomiting lasting longer than 4 hours or if unable to take medications  · Any signs of decreased circulation or nerve impairment to extremity: change in color, persistent  numbness, tingling, coldness or increase pain  · Any questions    What to do at Home:  Recommended activity: Activity as tolerated.     If you experience any of the following symptoms:  Uncontrolled pain or nausea/ vomiting,  please follow up with your doctor. *  Please give a list of your current medications to your Primary Care Provider. *  Please update this list whenever your medications are discontinued, doses are      changed, or new medications (including over-the-counter products) are added. *  Please carry medication information at all times in case of emergency situations. These are general instructions for a healthy lifestyle:    No smoking/ No tobacco products/ Avoid exposure to second hand smoke  Surgeon General's Warning:  Quitting smoking now greatly reduces serious risk to your health. Obesity, smoking, and sedentary lifestyle greatly increases your risk for illness    A healthy diet, regular physical exercise & weight monitoring are important for maintaining a healthy lifestyle    You may be retaining fluid if you have a history of heart failure or if you experience any of the following symptoms:  Weight gain of 3 pounds or more overnight or 5 pounds in a week, increased swelling in our hands or feet or shortness of breath while lying flat in bed. Please call your doctor as soon as you notice any of these symptoms; do not wait until your next office visit. The discharge information has been reviewed with the patient. The patient verbalized understanding. Discharge medications reviewed with the patient and appropriate educational materials and side effects teaching were provided.   ___________________________________________________________________________________________________________________________________

## 2021-07-30 LAB
BASOPHILS # BLD: 0 K/UL (ref 0–0.2)
BASOPHILS NFR BLD: 0 % (ref 0–2)
DIFFERENTIAL METHOD BLD: ABNORMAL
EOSINOPHIL # BLD: 0.2 K/UL (ref 0–0.8)
EOSINOPHIL NFR BLD: 2 % (ref 0.5–7.8)
ERYTHROCYTE [DISTWIDTH] IN BLOOD BY AUTOMATED COUNT: 13 % (ref 11.9–14.6)
HCT VFR BLD AUTO: 52.7 % (ref 41.1–50.3)
HGB BLD-MCNC: 17.9 G/DL (ref 13.6–17.2)
IMM GRANULOCYTES # BLD AUTO: 0 K/UL (ref 0–0.5)
IMM GRANULOCYTES NFR BLD AUTO: 0 % (ref 0–5)
LYMPHOCYTES # BLD: 0.9 K/UL (ref 0.5–4.6)
LYMPHOCYTES NFR BLD: 9 % (ref 13–44)
MCH RBC QN AUTO: 32 PG (ref 26.1–32.9)
MCHC RBC AUTO-ENTMCNC: 34 G/DL (ref 31.4–35)
MCV RBC AUTO: 94.3 FL (ref 79.6–97.8)
MONOCYTES # BLD: 0.6 K/UL (ref 0.1–1.3)
MONOCYTES NFR BLD: 6 % (ref 4–12)
NEUTS SEG # BLD: 8.3 K/UL (ref 1.7–8.2)
NEUTS SEG NFR BLD: 82 % (ref 43–78)
NRBC # BLD: 0 K/UL (ref 0–0.2)
PLATELET # BLD AUTO: 145 K/UL (ref 150–450)
PMV BLD AUTO: 13.4 FL (ref 9.4–12.3)
RBC # BLD AUTO: 5.59 M/UL (ref 4.23–5.6)
WBC # BLD AUTO: 9.7 K/UL (ref 4.3–11.1)

## 2022-03-18 PROBLEM — K85.90 PANCREATITIS: Status: ACTIVE | Noted: 2020-01-14

## 2022-03-18 PROBLEM — G89.29 CHRONIC PAIN: Status: ACTIVE | Noted: 2020-01-14

## 2022-03-18 PROBLEM — E87.1 HYPONATREMIA: Status: ACTIVE | Noted: 2020-01-14

## 2022-03-19 PROBLEM — F32.A DEPRESSION: Status: ACTIVE | Noted: 2020-01-14

## 2022-03-19 PROBLEM — R73.9 HYPERGLYCEMIA: Status: ACTIVE | Noted: 2020-01-14

## 2022-03-19 PROBLEM — N17.9 AKI (ACUTE KIDNEY INJURY) (HCC): Status: ACTIVE | Noted: 2020-01-14

## 2022-03-19 PROBLEM — I10 HYPERTENSION: Status: ACTIVE | Noted: 2020-01-14

## 2022-03-20 ENCOUNTER — HOSPITAL ENCOUNTER (EMERGENCY)
Age: 47
Discharge: HOME | End: 2022-03-21
Payer: COMMERCIAL

## 2022-03-20 DIAGNOSIS — F10.930 ALCOHOL WITHDRAWAL SEIZURE WITHOUT COMPLICATION (HCC): Primary | ICD-10-CM

## 2022-03-20 DIAGNOSIS — K85.20 ALCOHOL-INDUCED ACUTE PANCREATITIS, UNSPECIFIED COMPLICATION STATUS: ICD-10-CM

## 2022-03-20 DIAGNOSIS — R56.9 ALCOHOL WITHDRAWAL SEIZURE WITHOUT COMPLICATION (HCC): Primary | ICD-10-CM

## 2022-03-20 PROBLEM — F10.10 ALCOHOL ABUSE: Status: ACTIVE | Noted: 2020-01-14

## 2022-03-20 PROBLEM — Z72.0 TOBACCO USE: Status: ACTIVE | Noted: 2020-01-14

## 2022-03-20 PROCEDURE — 80053 COMPREHEN METABOLIC PANEL: CPT

## 2022-03-20 PROCEDURE — 85025 COMPLETE CBC W/AUTO DIFF WBC: CPT

## 2022-03-20 PROCEDURE — 96375 TX/PRO/DX INJ NEW DRUG ADDON: CPT

## 2022-03-20 PROCEDURE — 74011000250 HC RX REV CODE- 250

## 2022-03-20 PROCEDURE — 96365 THER/PROPH/DIAG IV INF INIT: CPT

## 2022-03-20 PROCEDURE — 96366 THER/PROPH/DIAG IV INF ADDON: CPT

## 2022-03-20 PROCEDURE — 83690 ASSAY OF LIPASE: CPT

## 2022-03-20 PROCEDURE — 83735 ASSAY OF MAGNESIUM: CPT

## 2022-03-20 PROCEDURE — 99284 EMERGENCY DEPT VISIT MOD MDM: CPT

## 2022-03-20 PROCEDURE — 82077 ASSAY SPEC XCP UR&BREATH IA: CPT

## 2022-03-20 PROCEDURE — 96368 THER/DIAG CONCURRENT INF: CPT

## 2022-03-20 RX ORDER — SODIUM CHLORIDE 0.9 % (FLUSH) 0.9 %
5-10 SYRINGE (ML) INJECTION EVERY 8 HOURS
Status: DISCONTINUED | OUTPATIENT
Start: 2022-03-20 | End: 2022-03-21 | Stop reason: HOSPADM

## 2022-03-20 RX ORDER — SODIUM CHLORIDE 0.9 % (FLUSH) 0.9 %
5-10 SYRINGE (ML) INJECTION AS NEEDED
Status: DISCONTINUED | OUTPATIENT
Start: 2022-03-20 | End: 2022-03-21 | Stop reason: HOSPADM

## 2022-03-20 RX ADMIN — SODIUM CHLORIDE, PRESERVATIVE FREE 10 ML: 5 INJECTION INTRAVENOUS at 23:54

## 2022-03-21 ENCOUNTER — APPOINTMENT (OUTPATIENT)
Dept: CT IMAGING | Age: 47
End: 2022-03-21
Payer: COMMERCIAL

## 2022-03-21 ENCOUNTER — APPOINTMENT (OUTPATIENT)
Dept: GENERAL RADIOLOGY | Age: 47
End: 2022-03-21
Payer: COMMERCIAL

## 2022-03-21 VITALS
WEIGHT: 230 LBS | RESPIRATION RATE: 23 BRPM | HEIGHT: 73 IN | OXYGEN SATURATION: 95 % | DIASTOLIC BLOOD PRESSURE: 125 MMHG | HEART RATE: 115 BPM | SYSTOLIC BLOOD PRESSURE: 204 MMHG | TEMPERATURE: 98.3 F | BODY MASS INDEX: 30.48 KG/M2

## 2022-03-21 LAB
ALBUMIN SERPL-MCNC: 3.7 G/DL (ref 3.5–5)
ALBUMIN/GLOB SERPL: 1 {RATIO} (ref 1.2–3.5)
ALP SERPL-CCNC: 147 U/L (ref 50–136)
ALT SERPL-CCNC: 74 U/L (ref 12–65)
ANION GAP SERPL CALC-SCNC: 11 MMOL/L (ref 7–16)
AST SERPL-CCNC: 56 U/L (ref 15–37)
BASOPHILS # BLD: 0 K/UL (ref 0–0.2)
BASOPHILS NFR BLD: 0 % (ref 0–2)
BILIRUB SERPL-MCNC: 1.4 MG/DL (ref 0.2–1.1)
BUN SERPL-MCNC: 7 MG/DL (ref 6–23)
CALCIUM SERPL-MCNC: 9.2 MG/DL (ref 8.3–10.4)
CHLORIDE SERPL-SCNC: 88 MMOL/L (ref 98–107)
CO2 SERPL-SCNC: 26 MMOL/L (ref 21–32)
CREAT SERPL-MCNC: 0.9 MG/DL (ref 0.8–1.5)
DIFFERENTIAL METHOD BLD: ABNORMAL
EOSINOPHIL # BLD: 0 K/UL (ref 0–0.8)
EOSINOPHIL NFR BLD: 0 % (ref 0.5–7.8)
ERYTHROCYTE [DISTWIDTH] IN BLOOD BY AUTOMATED COUNT: 11.4 % (ref 11.9–14.6)
ETHANOL SERPL-MCNC: <3 MG/DL
GLOBULIN SER CALC-MCNC: 3.6 G/DL (ref 2.3–3.5)
GLUCOSE SERPL-MCNC: 224 MG/DL (ref 65–100)
HCT VFR BLD AUTO: 42.6 % (ref 41.1–50.3)
HGB BLD-MCNC: 14.7 G/DL (ref 13.6–17.2)
IMM GRANULOCYTES # BLD AUTO: 0.1 K/UL (ref 0–0.5)
IMM GRANULOCYTES NFR BLD AUTO: 1 % (ref 0–5)
LIPASE SERPL-CCNC: 1359 U/L (ref 73–393)
LYMPHOCYTES # BLD: 0.8 K/UL (ref 0.5–4.6)
LYMPHOCYTES NFR BLD: 8 % (ref 13–44)
MAGNESIUM SERPL-MCNC: 1.9 MG/DL (ref 1.8–2.4)
MCH RBC QN AUTO: 32 PG (ref 26.1–32.9)
MCHC RBC AUTO-ENTMCNC: 34.5 G/DL (ref 31.4–35)
MCV RBC AUTO: 92.6 FL (ref 79.6–97.8)
MONOCYTES # BLD: 0.8 K/UL (ref 0.1–1.3)
MONOCYTES NFR BLD: 8 % (ref 4–12)
NEUTS SEG # BLD: 7.8 K/UL (ref 1.7–8.2)
NEUTS SEG NFR BLD: 83 % (ref 43–78)
NRBC # BLD: 0 K/UL (ref 0–0.2)
PLATELET # BLD AUTO: 88 K/UL (ref 150–450)
PMV BLD AUTO: 10.3 FL (ref 9.4–12.3)
POTASSIUM SERPL-SCNC: 3.5 MMOL/L (ref 3.5–5.1)
PROT SERPL-MCNC: 7.3 G/DL (ref 6.3–8.2)
RBC # BLD AUTO: 4.6 M/UL (ref 4.23–5.6)
SODIUM SERPL-SCNC: 125 MMOL/L (ref 138–145)
WBC # BLD AUTO: 9.5 K/UL (ref 4.3–11.1)

## 2022-03-21 PROCEDURE — 74022 RADEX COMPL AQT ABD SERIES: CPT

## 2022-03-21 PROCEDURE — 74011000250 HC RX REV CODE- 250

## 2022-03-21 PROCEDURE — 74011250636 HC RX REV CODE- 250/636

## 2022-03-21 PROCEDURE — 70450 CT HEAD/BRAIN W/O DYE: CPT

## 2022-03-21 RX ORDER — SODIUM CHLORIDE 9 MG/ML
1000 INJECTION, SOLUTION INTRAVENOUS ONCE
Status: COMPLETED | OUTPATIENT
Start: 2022-03-21 | End: 2022-03-21

## 2022-03-21 RX ORDER — LORAZEPAM 2 MG/ML
1 INJECTION INTRAMUSCULAR
Status: COMPLETED | OUTPATIENT
Start: 2022-03-21 | End: 2022-03-21

## 2022-03-21 RX ORDER — MAGNESIUM SULFATE HEPTAHYDRATE 40 MG/ML
2 INJECTION, SOLUTION INTRAVENOUS
Status: COMPLETED | OUTPATIENT
Start: 2022-03-21 | End: 2022-03-21

## 2022-03-21 RX ADMIN — SODIUM CHLORIDE 1000 ML: 9 INJECTION, SOLUTION INTRAVENOUS at 00:38

## 2022-03-21 RX ADMIN — LORAZEPAM 1 MG: 2 INJECTION INTRAMUSCULAR at 01:12

## 2022-03-21 RX ADMIN — FOLIC ACID: 5 INJECTION, SOLUTION INTRAMUSCULAR; INTRAVENOUS; SUBCUTANEOUS at 01:49

## 2022-03-21 RX ADMIN — MAGNESIUM SULFATE 2 G: 2 INJECTION INTRAVENOUS at 01:12

## 2022-03-21 NOTE — ED NOTES
I have reviewed discharge instructions with the patient. The patient verbalized understanding. Patient left ED via Discharge Method: ambulatory to Home with Mother and father. Opportunity for questions and clarification provided. Patient given 0 scripts.

## 2022-03-21 NOTE — ED TRIAGE NOTES
Patient arrives via EMS from home after having a possible seizure. EMS states family reported hearing the patient in the bathroom loudly breathing and when they went to check on him he wasn't responding, was foaming at the mouth, and had snoring respirations. Upon EMS arrival patient was confused but became more alert while in route. Patient arrives alert and oriented. States no history of seizures. EMS .

## 2022-03-21 NOTE — ED PROVIDER NOTES
44-year-old male possible seizure. Family heard a loud noise in his room came in and he was frothing at the mouth altered mental status. They believe he may have had a seizure although no tonic clonic activity was witnessed. Patient did not lose control of bowels or bladder. Patient had another seizure in the past due to alcohol withdrawal patient drinks daily but has not had anything to drink for 24 hours. Seizure   This is a recurrent problem. The current episode started less than 1 hour ago. There was 1 seizure. Associated symptoms include confusion. Pertinent negatives include no neck stiffness, no chest pain, no cough, no diarrhea and no muscle weakness. Unwitnessed The episode was not witnessed. Possible causes include change in alcohol use. There has been no fever. He reports confusion. He reports no chest pain, no diarrhea, no muscle weakness, no stiff neck, no cough. There were no medications administered prior to arrival. Home seizure medications include: no seizure medications. No past medical history on file. No past surgical history on file. No family history on file.     Social History     Socioeconomic History    Marital status: SINGLE     Spouse name: Not on file    Number of children: Not on file    Years of education: Not on file    Highest education level: Not on file   Occupational History    Not on file   Tobacco Use    Smoking status: Not on file    Smokeless tobacco: Not on file   Substance and Sexual Activity    Alcohol use: Not on file    Drug use: Not on file    Sexual activity: Not on file   Other Topics Concern    Not on file   Social History Narrative    Not on file     Social Determinants of Health     Financial Resource Strain:     Difficulty of Paying Living Expenses: Not on file   Food Insecurity:     Worried About Running Out of Food in the Last Year: Not on file    Devan of Food in the Last Year: Not on file   Transportation Needs:     Lack of Transportation (Medical): Not on file    Lack of Transportation (Non-Medical): Not on file   Physical Activity:     Days of Exercise per Week: Not on file    Minutes of Exercise per Session: Not on file   Stress:     Feeling of Stress : Not on file   Social Connections:     Frequency of Communication with Friends and Family: Not on file    Frequency of Social Gatherings with Friends and Family: Not on file    Attends Oriental orthodox Services: Not on file    Active Member of [address] [place] [address] or Organizations: Not on file    Attends Club or Organization Meetings: Not on file    Marital Status: Not on file   Intimate Partner Violence:     Fear of Current or Ex-Partner: Not on file    Emotionally Abused: Not on file    Physically Abused: Not on file    Sexually Abused: Not on file   Housing Stability:     Unable to Pay for Housing in the Last Year: Not on file    Number of [place] in the Last Year: Not on file    Unstable Housing in the Last Year: Not on file         ALLERGIES: Patient has no known allergies. Review of Systems   Constitutional: Negative. Negative for activity change. HENT: Negative. Eyes: Negative. Respiratory: Negative. Negative for cough. Cardiovascular: Negative. Negative for chest pain. Gastrointestinal: Negative. Negative for diarrhea. Genitourinary: Negative. Musculoskeletal: Negative. Skin: Negative. Neurological: Negative. Psychiatric/Behavioral: Positive for confusion. All other systems reviewed and are negative. Vitals:    03/20/22 2342 03/20/22 2350   BP: (!) 170/99    Pulse: (!) 118    Resp: 20    Temp: 98.3 °F (36.8 °C)    SpO2: 94% 94%   Weight: 104.3 kg (230 lb)    Height: 6' 1\" (1.854 m)             Physical Exam  Vitals and nursing note reviewed. Constitutional:       General: He is not in acute distress. Appearance: He is well-developed. HENT:      Head: Normocephalic and atraumatic.       Right Ear: External ear normal. Left Ear: External ear normal.      Nose: Nose normal.   Eyes:      General: No scleral icterus. Right eye: No discharge. Left eye: No discharge. Conjunctiva/sclera: Conjunctivae normal.      Pupils: Pupils are equal, round, and reactive to light. Cardiovascular:      Rate and Rhythm: Regular rhythm. Tachycardia present. Pulmonary:      Effort: Pulmonary effort is normal. No respiratory distress. Breath sounds: Normal breath sounds. No stridor. No wheezing or rales. Abdominal:      General: Bowel sounds are normal. There is no distension. Palpations: Abdomen is soft. Tenderness: There is no abdominal tenderness. Musculoskeletal:         General: Normal range of motion. Cervical back: Normal range of motion. Skin:     General: Skin is warm and dry. Findings: No rash. Neurological:      Mental Status: He is alert and oriented to person, place, and time. Motor: No abnormal muscle tone. Coordination: Coordination normal.   Psychiatric:         Behavior: Behavior normal.          MDM  Number of Diagnoses or Management Options  Alcohol withdrawal seizure without complication ([facility] [address]  Alcohol-induced acute pancreatitis, unspecified complication status  Diagnosis management comments: Family at bedside and related he has not been eating or drinking well for last few days family's been out of town. Patient states he drinks every day but since he was having some abdominal cramping he has not drunk since Friday. Arrangements be made admit patient to hospital for withdrawal seizures however patient stated he did not want to stay he would take care of himself. He was not interested in stopping drinking at this point he was warned that this could be very detrimental and could lead to permanent problems. He said \"I will be all right\". Per \"I been in this hospital too long already I want to leave\".   Patient informed of his lipase being high most likely secondary to alcohol. He again refused to stay.        Amount and/or Complexity of Data Reviewed  Clinical lab tests: ordered and reviewed  Tests in the radiology section of CPT®: ordered and reviewed  Tests in the medicine section of CPT®: ordered and reviewed  Decide to obtain previous medical records or to obtain history from someone other than the patient: yes  Review and summarize past medical records: yes  Discuss the patient with other providers: yes  Independent visualization of images, tracings, or specimens: yes    Risk of Complications, Morbidity, and/or Mortality  Presenting problems: high  Diagnostic procedures: high  Management options: high           EKG    Date/Time: 3/21/2022 5:12 AM  Performed by: Darian Quintana MD  Authorized by: Darian Quintana MD     ECG reviewed by ED Physician in the absence of a cardiologist: yes    Previous ECG:     Previous ECG:  Unavailable  Interpretation:     Interpretation: abnormal    Rate:     ECG rate:  117    ECG rate assessment: tachycardic    Rhythm:     Rhythm: sinus rhythm and atrial fibrillation

## 2024-03-22 ENCOUNTER — HOSPITAL ENCOUNTER (INPATIENT)
Age: 49
LOS: 6 days | Discharge: HOME | DRG: 871 | End: 2024-03-28
Attending: INTERNAL MEDICINE | Admitting: STUDENT IN AN ORGANIZED HEALTH CARE EDUCATION/TRAINING PROGRAM
Payer: COMMERCIAL

## 2024-03-22 ENCOUNTER — APPOINTMENT (OUTPATIENT)
Dept: GENERAL RADIOLOGY | Age: 49
DRG: 871 | End: 2024-03-22
Payer: COMMERCIAL

## 2024-03-22 ENCOUNTER — APPOINTMENT (OUTPATIENT)
Dept: CT IMAGING | Age: 49
DRG: 871 | End: 2024-03-22
Payer: COMMERCIAL

## 2024-03-22 ENCOUNTER — APPOINTMENT (OUTPATIENT)
Dept: ULTRASOUND IMAGING | Age: 49
DRG: 871 | End: 2024-03-22
Payer: COMMERCIAL

## 2024-03-22 DIAGNOSIS — N17.9 AKI (ACUTE KIDNEY INJURY) (HCC): Primary | ICD-10-CM

## 2024-03-22 DIAGNOSIS — F10.10 ETOH ABUSE: ICD-10-CM

## 2024-03-22 DIAGNOSIS — R18.8 OTHER ASCITES: ICD-10-CM

## 2024-03-22 DIAGNOSIS — K74.60 ESOPHAGEAL VARICES IN CIRRHOSIS (HCC): ICD-10-CM

## 2024-03-22 DIAGNOSIS — R19.02 LEFT UPPER QUADRANT ABDOMINAL MASS: ICD-10-CM

## 2024-03-22 DIAGNOSIS — I85.10 ESOPHAGEAL VARICES IN CIRRHOSIS (HCC): ICD-10-CM

## 2024-03-22 PROBLEM — D73.5 PERISPLENIC HEMATOMA: Status: ACTIVE | Noted: 2024-03-22

## 2024-03-22 PROBLEM — A41.9 SEPSIS (HCC): Status: ACTIVE | Noted: 2024-03-22

## 2024-03-22 PROBLEM — E80.6 HYPERBILIRUBINEMIA: Status: ACTIVE | Noted: 2024-03-22

## 2024-03-22 PROBLEM — K70.31 ASCITES DUE TO ALCOHOLIC CIRRHOSIS (HCC): Status: ACTIVE | Noted: 2024-03-22

## 2024-03-22 PROBLEM — R19.00 INTRAABDOMINAL MASS: Status: ACTIVE | Noted: 2024-03-22

## 2024-03-22 LAB
ALBUMIN SERPL-MCNC: 3.5 G/DL (ref 3.5–5)
ALBUMIN/GLOB SERPL: 0.9 (ref 0.4–1.6)
ALP SERPL-CCNC: 86 U/L (ref 50–136)
ALT SERPL-CCNC: 39 U/L (ref 12–65)
AMPHET UR QL SCN: NEGATIVE
ANION GAP SERPL CALC-SCNC: 10 MMOL/L (ref 2–11)
ANION GAP SERPL CALC-SCNC: 7 MMOL/L (ref 2–11)
AST SERPL-CCNC: 28 U/L (ref 15–37)
BACTERIA URNS QL MICRO: ABNORMAL /HPF
BASOPHILS # BLD: 0.1 K/UL (ref 0–0.2)
BASOPHILS NFR BLD: 1 % (ref 0–2)
BENZODIAZ UR QL: NEGATIVE
BILIRUB SERPL-MCNC: 1.4 MG/DL (ref 0.2–1.1)
BILIRUB UR QL: ABNORMAL
BUN SERPL-MCNC: 27 MG/DL (ref 6–23)
BUN SERPL-MCNC: 28 MG/DL (ref 6–23)
CALCIUM SERPL-MCNC: 8.5 MG/DL (ref 8.3–10.4)
CALCIUM SERPL-MCNC: 9 MG/DL (ref 8.3–10.4)
CANNABINOIDS UR QL SCN: NEGATIVE
CASTS URNS QL MICRO: ABNORMAL /LPF
CHLORIDE SERPL-SCNC: 89 MMOL/L (ref 103–113)
CHLORIDE SERPL-SCNC: 90 MMOL/L (ref 103–113)
CO2 SERPL-SCNC: 24 MMOL/L (ref 21–32)
CO2 SERPL-SCNC: 29 MMOL/L (ref 21–32)
COCAINE UR QL SCN: NEGATIVE
CREAT SERPL-MCNC: 1.6 MG/DL (ref 0.8–1.5)
CREAT SERPL-MCNC: 2.7 MG/DL (ref 0.8–1.5)
CRYSTALS URNS QL MICRO: 0 /LPF
DIFFERENTIAL METHOD BLD: ABNORMAL
EOSINOPHIL # BLD: 0.1 K/UL (ref 0–0.8)
EOSINOPHIL NFR BLD: 0 % (ref 0.5–7.8)
EPI CELLS #/AREA URNS HPF: ABNORMAL /HPF
ERYTHROCYTE [DISTWIDTH] IN BLOOD BY AUTOMATED COUNT: 11.6 % (ref 11.9–14.6)
EST. AVERAGE GLUCOSE BLD GHB EST-MCNC: 160 MG/DL
GLOBULIN SER CALC-MCNC: 4 G/DL (ref 2.8–4.5)
GLUCOSE BLD STRIP.AUTO-MCNC: 257 MG/DL (ref 65–100)
GLUCOSE SERPL-MCNC: 227 MG/DL (ref 65–100)
GLUCOSE SERPL-MCNC: 332 MG/DL (ref 65–100)
GLUCOSE UR QL STRIP.AUTO: 100 MG/DL
HAV IGM SER QL: NONREACTIVE
HBA1C MFR BLD: 7.2 % (ref 4.8–5.6)
HBV CORE IGM SER QL: NONREACTIVE
HBV SURFACE AG SER QL: NONREACTIVE
HCT VFR BLD AUTO: 31.5 % (ref 41.1–50.3)
HCV AB SER QL: NONREACTIVE
HGB BLD-MCNC: 10.8 G/DL (ref 13.6–17.2)
HGB BLD-MCNC: 8.5 G/DL (ref 13.6–17.2)
IMM GRANULOCYTES # BLD AUTO: 0.1 K/UL (ref 0–0.5)
IMM GRANULOCYTES NFR BLD AUTO: 1 % (ref 0–5)
INR PPP: 1.2
KETONES UR-MCNC: ABNORMAL MG/DL
LACTATE SERPL-SCNC: 2.3 MMOL/L (ref 0.4–2)
LACTATE SERPL-SCNC: 2.6 MMOL/L (ref 0.4–2)
LACTATE SERPL-SCNC: 2.8 MMOL/L (ref 0.4–2)
LEUKOCYTE ESTERASE UR QL STRIP: NEGATIVE
LIPASE SERPL-CCNC: 148 U/L (ref 73–393)
LYMPHOCYTES # BLD: 2.6 K/UL (ref 0.5–4.6)
LYMPHOCYTES NFR BLD: 18 % (ref 13–44)
MAGNESIUM SERPL-MCNC: 1.9 MG/DL (ref 1.8–2.4)
MCH RBC QN AUTO: 32.1 PG (ref 26.1–32.9)
MCHC RBC AUTO-ENTMCNC: 34.3 G/DL (ref 31.4–35)
MCV RBC AUTO: 93.8 FL (ref 82–102)
MONOCYTES # BLD: 2 K/UL (ref 0.1–1.3)
MONOCYTES NFR BLD: 13 % (ref 4–12)
MUCOUS THREADS URNS QL MICRO: ABNORMAL /LPF
NEUTS SEG # BLD: 9.9 K/UL (ref 1.7–8.2)
NEUTS SEG NFR BLD: 67 % (ref 43–78)
NITRITE UR QL: NEGATIVE
NRBC # BLD: 0 K/UL (ref 0–0.2)
OPIATES UR QL: NEGATIVE
OTHER OBSERVATIONS: ABNORMAL
PH UR: 5 (ref 5–9)
PLATELET # BLD AUTO: 119 K/UL (ref 150–450)
PMV BLD AUTO: 11.3 FL (ref 9.4–12.3)
POTASSIUM SERPL-SCNC: 3.8 MMOL/L (ref 3.5–5.1)
POTASSIUM SERPL-SCNC: 4.2 MMOL/L (ref 3.5–5.1)
PROT SERPL-MCNC: 7.5 G/DL (ref 6.3–8.2)
PROT UR QL: 100 MG/DL
PROTHROMBIN TIME: 16.1 SEC (ref 11.3–14.9)
RBC # BLD AUTO: 3.36 M/UL (ref 4.23–5.6)
RBC # UR STRIP: NEGATIVE
RBC #/AREA URNS HPF: 0 /HPF
SERVICE CMNT-IMP: ABNORMAL
SERVICE CMNT-IMP: ABNORMAL
SODIUM SERPL-SCNC: 123 MMOL/L (ref 136–146)
SODIUM SERPL-SCNC: 126 MMOL/L (ref 136–146)
SP GR UR: >1.03 (ref 1–1.02)
UROBILINOGEN UR QL: 0.2 EU/DL (ref 0.2–1)
WBC # BLD AUTO: 14.7 K/UL (ref 4.3–11.1)
WBC URNS QL MICRO: ABNORMAL /HPF

## 2024-03-22 PROCEDURE — 83935 ASSAY OF URINE OSMOLALITY: CPT

## 2024-03-22 PROCEDURE — 93005 ELECTROCARDIOGRAM TRACING: CPT | Performed by: INTERNAL MEDICINE

## 2024-03-22 PROCEDURE — 81003 URINALYSIS AUTO W/O SCOPE: CPT

## 2024-03-22 PROCEDURE — 99285 EMERGENCY DEPT VISIT HI MDM: CPT

## 2024-03-22 PROCEDURE — 99255 IP/OBS CONSLTJ NEW/EST HI 80: CPT | Performed by: SURGERY

## 2024-03-22 PROCEDURE — 74176 CT ABD & PELVIS W/O CONTRAST: CPT

## 2024-03-22 PROCEDURE — 80053 COMPREHEN METABOLIC PANEL: CPT

## 2024-03-22 PROCEDURE — 6370000000 HC RX 637 (ALT 250 FOR IP): Performed by: FAMILY MEDICINE

## 2024-03-22 PROCEDURE — 71045 X-RAY EXAM CHEST 1 VIEW: CPT

## 2024-03-22 PROCEDURE — 84300 ASSAY OF URINE SODIUM: CPT

## 2024-03-22 PROCEDURE — 83036 HEMOGLOBIN GLYCOSYLATED A1C: CPT

## 2024-03-22 PROCEDURE — 83690 ASSAY OF LIPASE: CPT

## 2024-03-22 PROCEDURE — 96374 THER/PROPH/DIAG INJ IV PUSH: CPT

## 2024-03-22 PROCEDURE — 86900 BLOOD TYPING SEROLOGIC ABO: CPT

## 2024-03-22 PROCEDURE — 85025 COMPLETE CBC W/AUTO DIFF WBC: CPT

## 2024-03-22 PROCEDURE — 81015 MICROSCOPIC EXAM OF URINE: CPT

## 2024-03-22 PROCEDURE — 83735 ASSAY OF MAGNESIUM: CPT

## 2024-03-22 PROCEDURE — 83930 ASSAY OF BLOOD OSMOLALITY: CPT

## 2024-03-22 PROCEDURE — 84443 ASSAY THYROID STIM HORMONE: CPT

## 2024-03-22 PROCEDURE — 83605 ASSAY OF LACTIC ACID: CPT

## 2024-03-22 PROCEDURE — 85018 HEMOGLOBIN: CPT

## 2024-03-22 PROCEDURE — 80074 ACUTE HEPATITIS PANEL: CPT

## 2024-03-22 PROCEDURE — 76705 ECHO EXAM OF ABDOMEN: CPT

## 2024-03-22 PROCEDURE — 80048 BASIC METABOLIC PNL TOTAL CA: CPT

## 2024-03-22 PROCEDURE — 80307 DRUG TEST PRSMV CHEM ANLYZR: CPT

## 2024-03-22 PROCEDURE — 2580000003 HC RX 258: Performed by: PHYSICIAN ASSISTANT

## 2024-03-22 PROCEDURE — 6360000002 HC RX W HCPCS: Performed by: INTERNAL MEDICINE

## 2024-03-22 PROCEDURE — 82962 GLUCOSE BLOOD TEST: CPT

## 2024-03-22 PROCEDURE — 1100000000 HC RM PRIVATE

## 2024-03-22 PROCEDURE — 36415 COLL VENOUS BLD VENIPUNCTURE: CPT

## 2024-03-22 PROCEDURE — 6370000000 HC RX 637 (ALT 250 FOR IP): Performed by: PHYSICIAN ASSISTANT

## 2024-03-22 PROCEDURE — 85610 PROTHROMBIN TIME: CPT

## 2024-03-22 PROCEDURE — 86850 RBC ANTIBODY SCREEN: CPT

## 2024-03-22 PROCEDURE — 2580000003 HC RX 258: Performed by: INTERNAL MEDICINE

## 2024-03-22 PROCEDURE — 86901 BLOOD TYPING SEROLOGIC RH(D): CPT

## 2024-03-22 PROCEDURE — 2580000003 HC RX 258: Performed by: SURGERY

## 2024-03-22 PROCEDURE — 6360000002 HC RX W HCPCS: Performed by: PHYSICIAN ASSISTANT

## 2024-03-22 PROCEDURE — 87040 BLOOD CULTURE FOR BACTERIA: CPT

## 2024-03-22 RX ORDER — MORPHINE SULFATE 2 MG/ML
2 INJECTION, SOLUTION INTRAMUSCULAR; INTRAVENOUS EVERY 4 HOURS PRN
Status: DISCONTINUED | OUTPATIENT
Start: 2024-03-22 | End: 2024-03-24

## 2024-03-22 RX ORDER — DEXTROSE MONOHYDRATE 100 MG/ML
INJECTION, SOLUTION INTRAVENOUS CONTINUOUS PRN
Status: DISCONTINUED | OUTPATIENT
Start: 2024-03-22 | End: 2024-03-28 | Stop reason: HOSPADM

## 2024-03-22 RX ORDER — LORAZEPAM 1 MG/1
1 TABLET ORAL
Status: DISCONTINUED | OUTPATIENT
Start: 2024-03-22 | End: 2024-03-28 | Stop reason: HOSPADM

## 2024-03-22 RX ORDER — SODIUM CHLORIDE 0.9 % (FLUSH) 0.9 %
5-40 SYRINGE (ML) INJECTION EVERY 12 HOURS SCHEDULED
Status: DISCONTINUED | OUTPATIENT
Start: 2024-03-22 | End: 2024-03-28 | Stop reason: HOSPADM

## 2024-03-22 RX ORDER — LORAZEPAM 1 MG/1
4 TABLET ORAL
Status: DISCONTINUED | OUTPATIENT
Start: 2024-03-22 | End: 2024-03-28 | Stop reason: HOSPADM

## 2024-03-22 RX ORDER — POLYETHYLENE GLYCOL 3350 17 G/17G
17 POWDER, FOR SOLUTION ORAL DAILY PRN
Status: DISCONTINUED | OUTPATIENT
Start: 2024-03-22 | End: 2024-03-22

## 2024-03-22 RX ORDER — LORAZEPAM 1 MG/1
2 TABLET ORAL
Status: DISCONTINUED | OUTPATIENT
Start: 2024-03-22 | End: 2024-03-28 | Stop reason: HOSPADM

## 2024-03-22 RX ORDER — ONDANSETRON 2 MG/ML
4 INJECTION INTRAMUSCULAR; INTRAVENOUS EVERY 6 HOURS PRN
Status: DISCONTINUED | OUTPATIENT
Start: 2024-03-22 | End: 2024-03-22

## 2024-03-22 RX ORDER — LANOLIN ALCOHOL/MO/W.PET/CERES
3 CREAM (GRAM) TOPICAL ONCE
Status: COMPLETED | OUTPATIENT
Start: 2024-03-22 | End: 2024-03-22

## 2024-03-22 RX ORDER — INSULIN LISPRO 100 [IU]/ML
0-4 INJECTION, SOLUTION INTRAVENOUS; SUBCUTANEOUS
Status: DISCONTINUED | OUTPATIENT
Start: 2024-03-22 | End: 2024-03-28 | Stop reason: HOSPADM

## 2024-03-22 RX ORDER — FAMOTIDINE 20 MG/1
20 TABLET, FILM COATED ORAL 2 TIMES DAILY
Status: DISCONTINUED | OUTPATIENT
Start: 2024-03-22 | End: 2024-03-25

## 2024-03-22 RX ORDER — IBUPROFEN 600 MG/1
1 TABLET ORAL PRN
Status: DISCONTINUED | OUTPATIENT
Start: 2024-03-22 | End: 2024-03-28 | Stop reason: HOSPADM

## 2024-03-22 RX ORDER — 0.9 % SODIUM CHLORIDE 0.9 %
1000 INTRAVENOUS SOLUTION INTRAVENOUS
Status: COMPLETED | OUTPATIENT
Start: 2024-03-22 | End: 2024-03-22

## 2024-03-22 RX ORDER — LORAZEPAM 1 MG/1
3 TABLET ORAL
Status: DISCONTINUED | OUTPATIENT
Start: 2024-03-22 | End: 2024-03-28 | Stop reason: HOSPADM

## 2024-03-22 RX ORDER — ONDANSETRON 4 MG/1
4 TABLET, ORALLY DISINTEGRATING ORAL EVERY 8 HOURS PRN
Status: DISCONTINUED | OUTPATIENT
Start: 2024-03-22 | End: 2024-03-22

## 2024-03-22 RX ORDER — NICOTINE 21 MG/24HR
1 PATCH, TRANSDERMAL 24 HOURS TRANSDERMAL DAILY
Status: DISCONTINUED | OUTPATIENT
Start: 2024-03-22 | End: 2024-03-28 | Stop reason: HOSPADM

## 2024-03-22 RX ORDER — INSULIN LISPRO 100 [IU]/ML
0-4 INJECTION, SOLUTION INTRAVENOUS; SUBCUTANEOUS NIGHTLY
Status: DISCONTINUED | OUTPATIENT
Start: 2024-03-22 | End: 2024-03-28 | Stop reason: HOSPADM

## 2024-03-22 RX ORDER — LANOLIN ALCOHOL/MO/W.PET/CERES
100 CREAM (GRAM) TOPICAL DAILY
Status: DISCONTINUED | OUTPATIENT
Start: 2024-03-23 | End: 2024-03-22

## 2024-03-22 RX ORDER — SODIUM CHLORIDE 9 MG/ML
INJECTION, SOLUTION INTRAVENOUS PRN
Status: DISCONTINUED | OUTPATIENT
Start: 2024-03-22 | End: 2024-03-23

## 2024-03-22 RX ORDER — SODIUM CHLORIDE 0.9 % (FLUSH) 0.9 %
5-40 SYRINGE (ML) INJECTION PRN
Status: DISCONTINUED | OUTPATIENT
Start: 2024-03-22 | End: 2024-03-28 | Stop reason: HOSPADM

## 2024-03-22 RX ORDER — SODIUM CHLORIDE 9 MG/ML
INJECTION, SOLUTION INTRAVENOUS CONTINUOUS
Status: DISCONTINUED | OUTPATIENT
Start: 2024-03-22 | End: 2024-03-22

## 2024-03-22 RX ORDER — MAGNESIUM SULFATE IN WATER 40 MG/ML
2000 INJECTION, SOLUTION INTRAVENOUS ONCE
Status: COMPLETED | OUTPATIENT
Start: 2024-03-22 | End: 2024-03-23

## 2024-03-22 RX ORDER — SODIUM CHLORIDE, SODIUM LACTATE, POTASSIUM CHLORIDE, CALCIUM CHLORIDE 600; 310; 30; 20 MG/100ML; MG/100ML; MG/100ML; MG/100ML
INJECTION, SOLUTION INTRAVENOUS CONTINUOUS
Status: DISCONTINUED | OUTPATIENT
Start: 2024-03-22 | End: 2024-03-23

## 2024-03-22 RX ORDER — LANOLIN ALCOHOL/MO/W.PET/CERES
100 CREAM (GRAM) TOPICAL
Status: COMPLETED | OUTPATIENT
Start: 2024-03-22 | End: 2024-03-22

## 2024-03-22 RX ORDER — VENLAFAXINE HYDROCHLORIDE 150 MG/1
150 CAPSULE, EXTENDED RELEASE ORAL DAILY
Status: DISCONTINUED | OUTPATIENT
Start: 2024-03-23 | End: 2024-03-28 | Stop reason: HOSPADM

## 2024-03-22 RX ADMIN — SODIUM CHLORIDE 1000 ML: 9 INJECTION, SOLUTION INTRAVENOUS at 14:55

## 2024-03-22 RX ADMIN — CEFTRIAXONE 1000 MG: 1 INJECTION, POWDER, FOR SOLUTION INTRAMUSCULAR; INTRAVENOUS at 15:46

## 2024-03-22 RX ADMIN — Medication 100 MG: at 14:52

## 2024-03-22 RX ADMIN — PIPERACILLIN AND TAZOBACTAM 4500 MG: 4; .5 INJECTION, POWDER, FOR SOLUTION INTRAVENOUS at 23:40

## 2024-03-22 RX ADMIN — Medication 3 MG: at 23:59

## 2024-03-22 RX ADMIN — SODIUM CHLORIDE, POTASSIUM CHLORIDE, SODIUM LACTATE AND CALCIUM CHLORIDE: 600; 310; 30; 20 INJECTION, SOLUTION INTRAVENOUS at 21:39

## 2024-03-22 RX ADMIN — SODIUM CHLORIDE, PRESERVATIVE FREE 10 ML: 5 INJECTION INTRAVENOUS at 21:49

## 2024-03-22 ASSESSMENT — PAIN - FUNCTIONAL ASSESSMENT: PAIN_FUNCTIONAL_ASSESSMENT: 0-10

## 2024-03-22 ASSESSMENT — PAIN SCALES - GENERAL: PAINLEVEL_OUTOF10: 0

## 2024-03-22 ASSESSMENT — LIFESTYLE VARIABLES
HOW OFTEN DO YOU HAVE A DRINK CONTAINING ALCOHOL: NEVER
HOW MANY STANDARD DRINKS CONTAINING ALCOHOL DO YOU HAVE ON A TYPICAL DAY: PATIENT DOES NOT DRINK

## 2024-03-22 NOTE — PROGRESS NOTES
TRANSFER - IN REPORT:    Verbal report received from YOVANI Arrieta on Papito Morillo Jr  being received from ED for routine progression of patient care      Report consisted of patient's Situation, Background, Assessment and   Recommendations(SBAR).     Information from the following report(s) ED Encounter Summary and ED SBAR was reviewed with the receiving nurse.    Opportunity for questions and clarification was provided.      Assessment completed upon patient's arrival to unit and care assumed.       SBAR received and given to primary receiving RNNeida.

## 2024-03-22 NOTE — ED PROVIDER NOTES
Emergency Department Provider Note       PCP: No primary care provider on file.   Age: 48 y.o.   Sex: male     DISPOSITION       No diagnosis found.    Medical Decision Making     48-year-old male history of alcohol abuse presents to the ER with mid to lower abdominal pain for the past week.  Has a history of pancreatitis.  He denies any fever he has had some nausea no vomiting no diarrhea.  Patient was afebrile in the ER he was slightly tachycardic on presentation CBC shows a white count of 14.7 urine unremarkable CMP with bun 28 cr 2.70, bili 1.4, lipase and mag normal, lactic acid 2.8 fluids given repeat 2.3, ct abd with ascites and mass to left abd area, pt was admitted by hosptialist for further evaluation pt also seen by er [facility] rep for alcohol abuse      1 or more acute illnesses that pose a threat to life or bodily function.   Shared medical decision making was utilized in creating the patients health plan today.    I independently ordered and reviewed each unique test.       a ct abd pelvis, us, labs    The patient was admitted and I have discussed patient management with the admitting provider.          History     Pt  c/o abd pain for past 3 days, was getting better but worse last night , took gas x and pepcid with little relief, no bm in 3 days, no meds taken, last etoh last night, drinks about 12 beers daily, no fever, passing urine normally, eatting and drinking ok, no vomiting    No past medical history on file.     No past surgical history on file.           ROS     Review of Systems   All other systems reviewed and are negative.       Physical Exam     Vitals signs and nursing note reviewed:  Vitals:    03/22/24 1041   BP: 129/72   Pulse: (!) 120   Resp: 16   Temp: 97.8 °F (36.6 °C)   TempSrc: Oral   SpO2: 98%      Physical Exam  Vitals reviewed.   Constitutional:       Appearance: Normal appearance. He is normal weight.   HENT:      Head: Normocephalic and atraumatic.      Right Ear: External  \"COVID19\" in the last 72 hours.    Voice dictation software was used during the making of this note.  This software is not perfect and grammatical and other typographical errors may be present.  This note has not been completely proofread for errors.      Heidi Rivera PA  03/22/24 9220

## 2024-03-22 NOTE — ED NOTES
TRANSFER - OUT REPORT:    Verbal report given to Lay PINA on Papito Morillo Jr  being transferred to [facility] for routine progression of patient care       Report consisted of patient's Situation, Background, Assessment and   Recommendations(SBAR).     Information from the following report(s) Nurse Handoff Report was reviewed with the receiving nurse.    Maeve Fall Assessment:    Presents to emergency department  because of falls (Syncope, seizure, or loss of consciousness): No  Age > 70: No  Altered Mental Status, Intoxication with alcohol or substance confusion (Disorientation, impaired judgment, poor safety awaremess, or inability to follow instructions): No  Impaired Mobility: Ambulates or transfers with assistive devices or assistance; Unable to ambulate or transer.: No  Nursing Judgement: No          Lines:   Peripheral IV 03/22/24 Left Hand (Active)   Site Assessment Clean, dry & intact 03/22/24 1703   Line Status Blood return noted 03/22/24 1703   Line Care Cap changed 03/22/24 1703   Phlebitis Assessment No symptoms 03/22/24 1703   Infiltration Assessment 0 03/22/24 1703   Dressing Status Clean, dry & intact 03/22/24 1703   Dressing Type Transparent 03/22/24 1703       Peripheral IV 03/22/24 Distal;Left Cephalic (Active)        Opportunity for questions and clarification was provided.      Patient transported with:  Berny Richards RN  03/22/24 1487

## 2024-03-22 NOTE — ED TRIAGE NOTES
Pt arrives to the ER with c/o abdominal pain x 4 days. Pt states that pain progressed. Pt states last night breaking out in sweat. No bowel movement since Tuesday.

## 2024-03-22 NOTE — ED NOTES
Blood cultures delayed d/t pt being in US and hard stick.     Berny Castellanos, YOVANI  03/22/24 9621

## 2024-03-23 ENCOUNTER — APPOINTMENT (OUTPATIENT)
Dept: CT IMAGING | Age: 49
DRG: 871 | End: 2024-03-23
Payer: COMMERCIAL

## 2024-03-23 LAB
ALBUMIN SERPL-MCNC: 3.1 G/DL (ref 3.5–5)
ALBUMIN/GLOB SERPL: 1 (ref 0.4–1.6)
ALP SERPL-CCNC: 74 U/L (ref 50–136)
ALT SERPL-CCNC: 41 U/L (ref 12–65)
ANION GAP SERPL CALC-SCNC: 3 MMOL/L (ref 2–11)
ANION GAP SERPL CALC-SCNC: 3 MMOL/L (ref 2–11)
ANION GAP SERPL CALC-SCNC: 5 MMOL/L (ref 2–11)
ANION GAP SERPL CALC-SCNC: 5 MMOL/L (ref 2–11)
ANION GAP SERPL CALC-SCNC: 7 MMOL/L (ref 2–11)
AST SERPL-CCNC: 42 U/L (ref 15–37)
BASOPHILS # BLD: 0.1 K/UL (ref 0–0.2)
BASOPHILS NFR BLD: 1 % (ref 0–2)
BILIRUB SERPL-MCNC: 0.9 MG/DL (ref 0.2–1.1)
BUN SERPL-MCNC: 18 MG/DL (ref 6–23)
BUN SERPL-MCNC: 19 MG/DL (ref 6–23)
BUN SERPL-MCNC: 20 MG/DL (ref 6–23)
BUN SERPL-MCNC: 21 MG/DL (ref 6–23)
BUN SERPL-MCNC: 25 MG/DL (ref 6–23)
CALCIUM SERPL-MCNC: 8.2 MG/DL (ref 8.3–10.4)
CALCIUM SERPL-MCNC: 8.3 MG/DL (ref 8.3–10.4)
CALCIUM SERPL-MCNC: 8.4 MG/DL (ref 8.3–10.4)
CALCIUM SERPL-MCNC: 8.5 MG/DL (ref 8.3–10.4)
CALCIUM SERPL-MCNC: 9 MG/DL (ref 8.3–10.4)
CHLORIDE SERPL-SCNC: 91 MMOL/L (ref 103–113)
CHLORIDE SERPL-SCNC: 96 MMOL/L (ref 103–113)
CHLORIDE SERPL-SCNC: 97 MMOL/L (ref 103–113)
CHLORIDE SERPL-SCNC: 99 MMOL/L (ref 103–113)
CHLORIDE SERPL-SCNC: 99 MMOL/L (ref 103–113)
CO2 SERPL-SCNC: 28 MMOL/L (ref 21–32)
CO2 SERPL-SCNC: 30 MMOL/L (ref 21–32)
CO2 SERPL-SCNC: 32 MMOL/L (ref 21–32)
CORTIS AM PEAK SERPL-MCNC: 14.5 UG/DL (ref 7–25)
CREAT SERPL-MCNC: 0.8 MG/DL (ref 0.8–1.5)
CREAT SERPL-MCNC: 0.8 MG/DL (ref 0.8–1.5)
CREAT SERPL-MCNC: 1 MG/DL (ref 0.8–1.5)
CREAT SERPL-MCNC: 1 MG/DL (ref 0.8–1.5)
CREAT SERPL-MCNC: 1.2 MG/DL (ref 0.8–1.5)
DIFFERENTIAL METHOD BLD: ABNORMAL
EKG ATRIAL RATE: 90 BPM
EKG ATRIAL RATE: 91 BPM
EKG DIAGNOSIS: NORMAL
EKG DIAGNOSIS: NORMAL
EKG P AXIS: 60 DEGREES
EKG P AXIS: 66 DEGREES
EKG P-R INTERVAL: 163 MS
EKG P-R INTERVAL: 168 MS
EKG Q-T INTERVAL: 392 MS
EKG Q-T INTERVAL: 413 MS
EKG QRS DURATION: 100 MS
EKG QRS DURATION: 96 MS
EKG QTC CALCULATION (BAZETT): 479 MS
EKG QTC CALCULATION (BAZETT): 509 MS
EKG R AXIS: -7 DEGREES
EKG R AXIS: 14 DEGREES
EKG T AXIS: 44 DEGREES
EKG T AXIS: 45 DEGREES
EKG VENTRICULAR RATE: 90 BPM
EKG VENTRICULAR RATE: 91 BPM
EOSINOPHIL # BLD: 0.3 K/UL (ref 0–0.8)
EOSINOPHIL NFR BLD: 2 % (ref 0.5–7.8)
ERYTHROCYTE [DISTWIDTH] IN BLOOD BY AUTOMATED COUNT: 11.4 % (ref 11.9–14.6)
FERRITIN SERPL-MCNC: 308 NG/ML (ref 8–388)
FOLATE SERPL-MCNC: 17.9 NG/ML (ref 3.1–17.5)
GLOBULIN SER CALC-MCNC: 3.2 G/DL (ref 2.8–4.5)
GLUCOSE BLD STRIP.AUTO-MCNC: 110 MG/DL (ref 65–100)
GLUCOSE BLD STRIP.AUTO-MCNC: 136 MG/DL (ref 65–100)
GLUCOSE BLD STRIP.AUTO-MCNC: 192 MG/DL (ref 65–100)
GLUCOSE SERPL-MCNC: 110 MG/DL (ref 65–100)
GLUCOSE SERPL-MCNC: 133 MG/DL (ref 65–100)
GLUCOSE SERPL-MCNC: 159 MG/DL (ref 65–100)
GLUCOSE SERPL-MCNC: 160 MG/DL (ref 65–100)
GLUCOSE SERPL-MCNC: 224 MG/DL (ref 65–100)
HCT VFR BLD AUTO: 22 % (ref 41.1–50.3)
HCT VFR BLD AUTO: 22.5 % (ref 41.1–50.3)
HCT VFR BLD AUTO: 23.7 % (ref 41.1–50.3)
HCT VFR BLD AUTO: 23.9 % (ref 41.1–50.3)
HGB BLD-MCNC: 7.5 G/DL (ref 13.6–17.2)
HGB BLD-MCNC: 7.6 G/DL (ref 13.6–17.2)
HGB BLD-MCNC: 8.2 G/DL (ref 13.6–17.2)
HGB BLD-MCNC: 8.2 G/DL (ref 13.6–17.2)
IMM GRANULOCYTES # BLD AUTO: 0 K/UL (ref 0–0.5)
IMM GRANULOCYTES NFR BLD AUTO: 0 % (ref 0–5)
IRON SATN MFR SERPL: 12 %
IRON SERPL-MCNC: 38 UG/DL (ref 35–150)
LACTATE SERPL-SCNC: 1.3 MMOL/L (ref 0.4–2)
LACTATE SERPL-SCNC: 1.4 MMOL/L (ref 0.4–2)
LACTATE SERPL-SCNC: 1.4 MMOL/L (ref 0.4–2)
LYMPHOCYTES # BLD: 2.7 K/UL (ref 0.5–4.6)
LYMPHOCYTES NFR BLD: 26 % (ref 13–44)
MCH RBC QN AUTO: 32.4 PG (ref 26.1–32.9)
MCHC RBC AUTO-ENTMCNC: 34.6 G/DL (ref 31.4–35)
MCV RBC AUTO: 93.7 FL (ref 82–102)
MONOCYTES # BLD: 1.6 K/UL (ref 0.1–1.3)
MONOCYTES NFR BLD: 15 % (ref 4–12)
NEUTS SEG # BLD: 5.9 K/UL (ref 1.7–8.2)
NEUTS SEG NFR BLD: 56 % (ref 43–78)
NRBC # BLD: 0 K/UL (ref 0–0.2)
OSMOLALITY SERPL: 276 MOSM/KG H2O (ref 275–295)
OSMOLALITY UR: 260 MOSM/KG H2O (ref 50–1400)
PLATELET # BLD AUTO: 88 K/UL (ref 150–450)
PMV BLD AUTO: 11.1 FL (ref 9.4–12.3)
POTASSIUM SERPL-SCNC: 3.7 MMOL/L (ref 3.5–5.1)
POTASSIUM SERPL-SCNC: 3.8 MMOL/L (ref 3.5–5.1)
POTASSIUM SERPL-SCNC: 3.9 MMOL/L (ref 3.5–5.1)
PROCALCITONIN SERPL-MCNC: 0.22 NG/ML (ref 0–0.49)
PROT SERPL-MCNC: 6.3 G/DL (ref 6.3–8.2)
RBC # BLD AUTO: 2.53 M/UL (ref 4.23–5.6)
SERVICE CMNT-IMP: ABNORMAL
SODIUM SERPL-SCNC: 126 MMOL/L (ref 136–146)
SODIUM SERPL-SCNC: 131 MMOL/L (ref 136–146)
SODIUM SERPL-SCNC: 132 MMOL/L (ref 136–146)
SODIUM SERPL-SCNC: 132 MMOL/L (ref 136–146)
SODIUM SERPL-SCNC: 134 MMOL/L (ref 136–146)
SODIUM UR-SCNC: <5 MMOL/L
TIBC SERPL-MCNC: 316 UG/DL (ref 250–450)
TSH W FREE THYROID IF ABNORMAL: 2.59 UIU/ML (ref 0.36–3.74)
VIT B12 SERPL-MCNC: 949 PG/ML (ref 193–986)
WBC # BLD AUTO: 10.5 K/UL (ref 4.3–11.1)

## 2024-03-23 PROCEDURE — 82746 ASSAY OF FOLIC ACID SERUM: CPT

## 2024-03-23 PROCEDURE — 6370000000 HC RX 637 (ALT 250 FOR IP): Performed by: SURGERY

## 2024-03-23 PROCEDURE — 80048 BASIC METABOLIC PNL TOTAL CA: CPT

## 2024-03-23 PROCEDURE — 2580000003 HC RX 258: Performed by: INTERNAL MEDICINE

## 2024-03-23 PROCEDURE — 85025 COMPLETE CBC W/AUTO DIFF WBC: CPT

## 2024-03-23 PROCEDURE — 83605 ASSAY OF LACTIC ACID: CPT

## 2024-03-23 PROCEDURE — 6360000002 HC RX W HCPCS: Performed by: INTERNAL MEDICINE

## 2024-03-23 PROCEDURE — 87641 MR-STAPH DNA AMP PROBE: CPT

## 2024-03-23 PROCEDURE — 36415 COLL VENOUS BLD VENIPUNCTURE: CPT

## 2024-03-23 PROCEDURE — 6360000004 HC RX CONTRAST MEDICATION: Performed by: STUDENT IN AN ORGANIZED HEALTH CARE EDUCATION/TRAINING PROGRAM

## 2024-03-23 PROCEDURE — 82607 VITAMIN B-12: CPT

## 2024-03-23 PROCEDURE — 93010 ELECTROCARDIOGRAM REPORT: CPT | Performed by: INTERNAL MEDICINE

## 2024-03-23 PROCEDURE — 82962 GLUCOSE BLOOD TEST: CPT

## 2024-03-23 PROCEDURE — 74177 CT ABD & PELVIS W/CONTRAST: CPT

## 2024-03-23 PROCEDURE — 2500000003 HC RX 250 WO HCPCS: Performed by: SURGERY

## 2024-03-23 PROCEDURE — 85014 HEMATOCRIT: CPT

## 2024-03-23 PROCEDURE — 6370000000 HC RX 637 (ALT 250 FOR IP): Performed by: STUDENT IN AN ORGANIZED HEALTH CARE EDUCATION/TRAINING PROGRAM

## 2024-03-23 PROCEDURE — 1100000000 HC RM PRIVATE

## 2024-03-23 PROCEDURE — 93005 ELECTROCARDIOGRAM TRACING: CPT | Performed by: INTERNAL MEDICINE

## 2024-03-23 PROCEDURE — 83550 IRON BINDING TEST: CPT

## 2024-03-23 PROCEDURE — 80053 COMPREHEN METABOLIC PANEL: CPT

## 2024-03-23 PROCEDURE — 84145 PROCALCITONIN (PCT): CPT

## 2024-03-23 PROCEDURE — 6360000002 HC RX W HCPCS: Performed by: SURGERY

## 2024-03-23 PROCEDURE — 82728 ASSAY OF FERRITIN: CPT

## 2024-03-23 PROCEDURE — 85018 HEMOGLOBIN: CPT

## 2024-03-23 PROCEDURE — 6370000000 HC RX 637 (ALT 250 FOR IP): Performed by: INTERNAL MEDICINE

## 2024-03-23 PROCEDURE — 99233 SBSQ HOSP IP/OBS HIGH 50: CPT | Performed by: SURGERY

## 2024-03-23 PROCEDURE — 83540 ASSAY OF IRON: CPT

## 2024-03-23 PROCEDURE — 82533 TOTAL CORTISOL: CPT

## 2024-03-23 PROCEDURE — 6370000000 HC RX 637 (ALT 250 FOR IP): Performed by: FAMILY MEDICINE

## 2024-03-23 PROCEDURE — 2580000003 HC RX 258: Performed by: SURGERY

## 2024-03-23 RX ORDER — SENNA AND DOCUSATE SODIUM 50; 8.6 MG/1; MG/1
2 TABLET, FILM COATED ORAL DAILY
Status: DISCONTINUED | OUTPATIENT
Start: 2024-03-23 | End: 2024-03-28 | Stop reason: HOSPADM

## 2024-03-23 RX ORDER — LANOLIN ALCOHOL/MO/W.PET/CERES
3 CREAM (GRAM) TOPICAL ONCE
Status: COMPLETED | OUTPATIENT
Start: 2024-03-23 | End: 2024-03-23

## 2024-03-23 RX ORDER — SODIUM CHLORIDE AND POTASSIUM CHLORIDE 150; 450 MG/100ML; MG/100ML
INJECTION, SOLUTION INTRAVENOUS CONTINUOUS
Status: DISCONTINUED | OUTPATIENT
Start: 2024-03-23 | End: 2024-03-24

## 2024-03-23 RX ADMIN — FAMOTIDINE 20 MG: 20 TABLET, FILM COATED ORAL at 21:38

## 2024-03-23 RX ADMIN — DIATRIZOATE MEGLUMINE AND DIATRIZOATE SODIUM 15 ML: 660; 100 LIQUID ORAL; RECTAL at 09:25

## 2024-03-23 RX ADMIN — MAGNESIUM SULFATE HEPTAHYDRATE 2000 MG: 40 INJECTION, SOLUTION INTRAVENOUS at 00:27

## 2024-03-23 RX ADMIN — Medication 2500 MG: at 00:34

## 2024-03-23 RX ADMIN — PIPERACILLIN AND TAZOBACTAM 3375 MG: 3; .375 INJECTION, POWDER, FOR SOLUTION INTRAVENOUS at 06:06

## 2024-03-23 RX ADMIN — SODIUM CHLORIDE, PRESERVATIVE FREE 10 ML: 5 INJECTION INTRAVENOUS at 09:26

## 2024-03-23 RX ADMIN — FAMOTIDINE 20 MG: 20 TABLET, FILM COATED ORAL at 00:04

## 2024-03-23 RX ADMIN — SODIUM CHLORIDE, POTASSIUM CHLORIDE, SODIUM LACTATE AND CALCIUM CHLORIDE: 600; 310; 30; 20 INJECTION, SOLUTION INTRAVENOUS at 06:47

## 2024-03-23 RX ADMIN — POTASSIUM CHLORIDE AND SODIUM CHLORIDE: 450; 150 INJECTION, SOLUTION INTRAVENOUS at 22:36

## 2024-03-23 RX ADMIN — THIAMINE HYDROCHLORIDE: 100 INJECTION, SOLUTION INTRAMUSCULAR; INTRAVENOUS at 11:29

## 2024-03-23 RX ADMIN — VANCOMYCIN HYDROCHLORIDE 1250 MG: 10 INJECTION, POWDER, LYOPHILIZED, FOR SOLUTION INTRAVENOUS at 12:21

## 2024-03-23 RX ADMIN — SODIUM CHLORIDE, PRESERVATIVE FREE 10 ML: 5 INJECTION INTRAVENOUS at 21:39

## 2024-03-23 RX ADMIN — THIAMINE HYDROCHLORIDE: 100 INJECTION, SOLUTION INTRAMUSCULAR; INTRAVENOUS at 00:21

## 2024-03-23 RX ADMIN — POTASSIUM CHLORIDE AND SODIUM CHLORIDE: 450; 150 INJECTION, SOLUTION INTRAVENOUS at 12:18

## 2024-03-23 RX ADMIN — IOPAMIDOL 100 ML: 755 INJECTION, SOLUTION INTRAVENOUS at 13:02

## 2024-03-23 RX ADMIN — FAMOTIDINE 20 MG: 20 TABLET, FILM COATED ORAL at 09:24

## 2024-03-23 RX ADMIN — Medication 3 MG: at 22:32

## 2024-03-23 RX ADMIN — VENLAFAXINE HYDROCHLORIDE 150 MG: 150 CAPSULE, EXTENDED RELEASE ORAL at 09:24

## 2024-03-23 RX ADMIN — DOCUSATE SODIUM 50 MG AND SENNOSIDES 8.6 MG 2 TABLET: 8.6; 5 TABLET, FILM COATED ORAL at 21:38

## 2024-03-23 NOTE — PROGRESS NOTES
VANCO DAILY FOLLOW UP NOTE  [facility]   Pharmacy Pharmacokinetic Monitoring Service - Vancomycin    Consulting Provider: Dr. Dill   Indication: sepsis  Target Concentration: Goal AUC/CHEYENNE 400-600 mg*hr/L  Day of Therapy: 1 of 7  Additional Antimicrobials: pip/tazo    Pertinent Laboratory Values:   Wt Readings from Last 1 Encounters:   03/22/24 102.1 kg (225 lb)     Temp Readings from Last 1 Encounters:   03/23/24 98.2 °F (36.8 °C)     Recent Labs     03/22/24  1118 03/22/24  1452 03/22/24  1842 03/22/24  2033 03/22/24  2337 03/23/24  0234 03/23/24  0451   BUN 28*  --  27*  --  25*  --  21   CREATININE 2.70*  --  1.60*  --  1.20  --  1.00   WBC 14.7*  --   --   --   --   --  10.5   PROCAL  --   --   --   --   --   --  0.22   LACACIDPL  --    < >  --    < > 1.4 1.4 1.3    < > = values in this interval not displayed.     Estimated Creatinine Clearance: 116 mL/min (based on SCr of 1 mg/dL).    No results found for: \"VANCOTROUGH\", \"VANCORANDOM\"    MRSA Nasal Swab: N/A. Non-respiratory infection    Assessment:  Date/Time Dose Concentration AUC         Note: Serum concentrations collected for AUC dosing may appear elevated if collected in close proximity to the dose administered, this is not necessarily an indication of toxicity    Plan:  Dosing recommendations based on Bayesian software  Continue vancomycin 1250 mg every 12 hours  Renal labs as indicated 04  Vancomycin concentration ordered for 3/24 @ 0400    Pharmacy will continue to monitor patient and adjust therapy as indicated    Thank you for the consult,  [facility], located within [facility]

## 2024-03-23 NOTE — PROGRESS NOTES
4 Eyes Skin Assessment     NAME:  Papito Morillo Jr  YOB: 1975  MEDICAL RECORD NUMBER:  [id number]    The patient is being assessed for  Admission    I agree that at least one RN has performed a thorough Head to Toe Skin Assessment on the patient. ALL assessment sites listed below have been assessed.      Areas assessed by both nurses:    Other All        Does the Patient have a Wound? No noted wound(s)       Roel Prevention initiated by RN: No  Wound Care Orders initiated by RN: No    Pressure Injury (Stage 3,4, Unstageable, DTI, NWPT, and Complex wounds) if present, place Wound referral order by RN under : No    New Ostomies, if present place, Ostomy referral order under : No     Nurse 1 eSignature: Electronically signed by Beverley Foote RN on 3/23/24 at 2:40 AM EDT    **SHARE this note so that the co-signing nurse can place an eSignature**    Nurse 2 eSignature: Electronically signed by CHANTEL FAULKNER RN on 3/23/24 at 2:43 AM EDT

## 2024-03-23 NOTE — PROGRESS NOTES
PM assessment done. No changes noted. Respiration even and unlabored 20/min at rest. No s/s of pain noted at present. Encouraged to call with needs.

## 2024-03-23 NOTE — PROGRESS NOTES
VANCO DAILY NOTE  [facility]   Pharmacy Pharmacokinetic Monitoring Service - Vancomycin    Consulting Provider: Dora   Indication: Sepsis  Target Concentration: Goal AUC/CHEYENNE 400-600 mg*hr/L  Day of Therapy: 1  Additional Antimicrobials: Zosyn    Pertinent Laboratory Values:   Wt Readings from Last 1 Encounters:   03/22/24 102.1 kg (225 lb)     Temp Readings from Last 1 Encounters:   03/22/24 99 °F (37.2 °C) (Oral)     Recent Labs     03/22/24  1118 03/22/24  1452 03/22/24  1707 03/22/24  1842 03/22/24  2033 03/22/24  2337   BUN 28*  --   --  27*  --  25*   CREATININE 2.70*  --   --  1.60*  --  1.20   WBC 14.7*  --   --   --   --   --    LACACIDPL  --    < > 2.3*  --  2.6* 1.4    < > = values in this interval not displayed.     Estimated Creatinine Clearance: 97 mL/min (based on SCr of 1.2 mg/dL).    No results found for: \"VANCOTROUGH\", \"VANCORANDOM\"    MRSA Nasal Swab: N/A. Non-respiratory infection    Assessment:  Date/Time Dose Concentration AUC         Note: Serum concentrations collected for AUC dosing may appear elevated if collected in close proximity to the dose administered, this is not necessarily an indication of toxicity    Plan:  Dosing recommendations based on Bayesian software  Start vancomycin 2500 mg x1, then 1250 mg q 12 hours  Anticipated AUC of 541 and trough concentration of 16 at steady state  Renal labs as indicated   Vancomycin concentrations will be ordered as clinically appropriate   Pharmacy will continue to monitor patient and adjust therapy as indicated    Thank you for the consult,  Kaitlyn Alexander, PharmD, BCPS

## 2024-03-23 NOTE — PROGRESS NOTES
Resting quietly at present. NAD noted. Safety maintained through out the shift. To report off to on coming nurse.

## 2024-03-23 NOTE — PROGRESS NOTES
Returns back to room from CT via stretcher and transport. Denies needs at present. Family at bedside. Remains NPO per order. Aware to call the desk with needs.

## 2024-03-23 NOTE — PROGRESS NOTES
Bedside report received from night nurse Beverley. Assessment done as noted  Respiration even and unlabored 20/min; denies pain or nausea at present. NPO per order. Encouraged to call with needs.

## 2024-03-23 NOTE — PROGRESS NOTES
Hospitalist Progress Note   Admit Date:  3/22/2024 11:03 AM   Name:  Papito Morillo Jr   Age:  48 y.o.  Sex:  male  :  1975   MRN:  [id number]   Room:  [facility]/    Presenting/Chief Complaint: No chief complaint on file.     Reason(s) for Admission: ETOH abuse [F10.10]  Other ascites [R18.8]  LEANDRA (acute kidney injury) (HCC) [N17.9]  Left upper quadrant abdominal mass [R19.02]     Hospital Course:     Papito Morillo Jr is a 48 y.o. male with medical history of ETOH use daily 8-10 nightly, chronic pain on suboxone, tobacco use, who is evaluated with lower abdominal pain 5-6/10  No nausea or emesis  Eating  No trauma  Has been told has prior liver issues  No kristina cirrhosis  Not taking anticoagulation  Has darker urine  No fever  No dyspnea            No prior diabetes      - corrected for hyperglycemia at 129     Creatinine 2.70     CTAP shows in determinant area hypodensity along medial spleen, LLQ mass possibly hematoma along gastrosplenic region with mass effect on stomach to pancreas , moderate ascites         US ABD confirms ascites         HGB 10.8  Total bilirubin 1.4  Platelets 119  WBC elevated         Meets sepsis criteria due to leukocytosis, lactic acidosis            Mother Kaitlyn [phone]  FULL CODE    Subjective & 24hr Events:   Patient was seen and examined at the bedside.  He was constipated and last BM was on Tuesday. Family was at the bedside.      Assessment & Plan:   LEANDRA (acute kidney injury) (HCC)  Resolved  Continue IV fluids    Mass between stomach, pancreas and spleen on CT   Possible hematoma vs  mass vs pseudocyst  Follow-up with repeat CT abdomen pelvis with IV contrast  No need of emergent surgery   Pain management with IV morphine  Surgery following, appreciate recommendations      Ascites:  IR consulted for paracentesis     Chronic pain:      Holding suboxone  Added IV morphine every 4 hours prn        Acute hyponatremia in the setting

## 2024-03-23 NOTE — PROGRESS NOTES
inpatients): 3/22/2024   * No surgery found *  * No surgery found *        ASSESSMENT/PLAN:  [unfilled]  Principal Problem:    LEANDRA (acute kidney injury) (HCC)  Active Problems:    Hyponatremia    Chronic pain    Hypertension    Depression    Hyperglycemia    Tobacco use    Alcohol abuse    Ascites due to alcoholic cirrhosis (HCC)    Hyperbilirubinemia    Perisplenic hematoma    Intraabdominal mass on CT 3/22/24 (between stomach spleen and pancreas)    Sepsis (HCC)  Resolved Problems:    * No resolved hospital problems. *     Patient Active Problem List    Diagnosis Date Noted    Ascites due to alcoholic cirrhosis (HCC) 03/22/2024    Hyperbilirubinemia 03/22/2024    Perisplenic hematoma 03/22/2024    Intraabdominal mass on CT 3/22/24 (between stomach spleen and pancreas) 03/22/2024    Sepsis (HCC) 03/22/2024    Pancreatitis 01/14/2020    Hyponatremia 01/14/2020    Chronic pain 01/14/2020    Hypertension 01/14/2020    Depression 01/14/2020    Hyperglycemia 01/14/2020    LEANDRA (acute kidney injury) (HCC) 01/14/2020    Tobacco use 01/14/2020    Alcohol abuse 01/14/2020          Number and Complexity of Problems addressed and   Risks of complications and/or morbidity of management          Acute renal insufficiency likely related to dehydration  Hydrate with 1/2NS + 20 KCL (changed from LR)  Daily BMP    Cr normalized this am Cr 1.6-->1.0 with 1800cc urine/24hrs  CT abd/pelvis with oral and IV contrast this am      Mass between stomach, pancreas and spleen on CT   Possible pancreatic pseudocyst vs mass vs hematoma  Repeat contrasted CT this am  Serial Hgb 10.8-->8.2  Transfuse as needed  No emergent surgery at this time      Possible perisplenic hematoma on CT (separate from the above mass)  Serial Hgb  Repeat CT imaging when Creatinine normalizes  Avoid NSAIDs  Parenteral morphine IV ordered as needed for pain      Suspected ETOH cirrhosis, portal HTN, and ascites  Thrombocytopenia  Portosystemic collaterals on CT with  Tests, documents, or independent historian(s)  ?Any combination of 3 from the following:   ?Review of prior external note(s) from each unique source*;  ?Review of the result(s) of each unique test*;  ?Ordering of each unique test*;  ?Assessment requiring an independent historian(s)    or  Category 2: Independent interpretation of tests   ?Independent interpretation of a test performed by another physician/other qualified health care professional (not separately reported);     or  Category 3: Discussion of management or test interpretation  ?Discussion of management or test interpretation with external physician/other qualified health care professional/appropriate source (not separately reported)   Moderate risk of morbidity from additional diagnostic testing or treatment  Examples only:  ?Prescription drug management   ?Decision regarding minor surgery with identified patient or procedure risk factors  ?Decision regarding elective major surgery without identified patient or procedure risk factors   ?Diagnosis or treatment significantly limited by social determinants of health       [id number]  [id number] High High  ?1or more chronic illnesses with severe exacerbation, progression, or side effects of treatment;    or  ?1 acute or chronic illness or injury that poses a threat to life or bodily function - abd mass with cirrhosis, portal HTN and acute renal failure   Extensive  (Must meet the requirements of at least 2 out of 3 categories)  Category 1: Tests, documents, or independent historian(s)  ?Any combination of 3 from the following:   ?Review of prior external note(s) from each unique source*;  ?Review of the result(s) of each unique test*;   ?Ordering of each unique test*;   ?Assessment requiring an independent historian(s)    or   Category 2: Independent interpretation of tests   ?Independent interpretation of a test performed by another physician/other qualified health care professional (not separately reported);

## 2024-03-23 NOTE — H&P
Hospitalist History and Physical   Admit Date:  3/22/2024 11:03 AM   Name:  Papito Morillo Jr   Age:  48 y.o.  Sex:  male  :  1975   MRN:  [id number]   Room:  [facility]/    Presenting/Chief Complaint: No chief complaint on file.     Reason(s) for Admission: ETOH abuse [F10.10]  Other ascites [R18.8]  LEANDRA (acute kidney injury) (HCC) [N17.9]  Left upper quadrant abdominal mass [R19.02]     History of Present Illness:       Papito Morillo Jr is a 48 y.o. male with medical history of ETOH use daily 8-10 nightly, chronic pain on suboxone, tobacco use, who is evaluated with lower abdominal pain 5-6/10  No nausea or emesis  Eating  No trauma  Has been told has prior liver issues  No kristina cirrhosis  Not taking anticoagulation  Has darker urine  No fever  No dyspnea         No prior diabetes           - corrected for hyperglycemia at 129    Creatinine 2.70    CTAP shows in determinant area hypodensity along medial spleen, LLQ mass possibly hematoma along gastrosplenic region with mass effect on stomach to pancreas , moderate ascites       US ABD confirms ascites       HGB 10.8  Total bilirubin 1.4  Platelets 119  WBC elevated       Meets sepsis criteria due to leukocytosis, lactic acidosis         Mother Kaitlyn [phone]  FULL CODE         Assessment & Plan:     Principal Problem:    LEANDRA (acute kidney injury) (HCC)  Plan:   Admit remote tele   cc/hr   Followup BMP tomorrow           Possible intra abdominal hematoma and splenic lesion:  NPO  Consulted surgery- message sent to on call NP  No anticoagulation  Trend HGB  Checked INR- 1.2   D1 zosyn for intra abdominal source         Ascites:  New onset  Likely ETOH related  May need paracentesis pending course           Chronic pain:    Holding suboxone  Added IV morphine every 4 hours as needed       Acute hyponatremia:  Corrected for hyperglycemia to 129  Trend BMP every 6 hours   Avoid rapid overcorrection, 4-6 points  note has been proof read but may still contain some grammatical/other typographical errors.

## 2024-03-24 PROBLEM — F10.10 ETOH ABUSE: Status: ACTIVE | Noted: 2024-03-24

## 2024-03-24 LAB
ABO + RH BLD: NORMAL
ANION GAP SERPL CALC-SCNC: 2 MMOL/L (ref 2–11)
ANION GAP SERPL CALC-SCNC: 3 MMOL/L (ref 2–11)
ANION GAP SERPL CALC-SCNC: 3 MMOL/L (ref 2–11)
ANION GAP SERPL CALC-SCNC: 4 MMOL/L (ref 2–11)
BLOOD GROUP ANTIBODIES SERPL: NORMAL
BUN SERPL-MCNC: 11 MG/DL (ref 6–23)
BUN SERPL-MCNC: 12 MG/DL (ref 6–23)
BUN SERPL-MCNC: 14 MG/DL (ref 6–23)
BUN SERPL-MCNC: 15 MG/DL (ref 6–23)
CALCIUM SERPL-MCNC: 8.3 MG/DL (ref 8.3–10.4)
CALCIUM SERPL-MCNC: 8.5 MG/DL (ref 8.3–10.4)
CALCIUM SERPL-MCNC: 8.6 MG/DL (ref 8.3–10.4)
CALCIUM SERPL-MCNC: 8.8 MG/DL (ref 8.3–10.4)
CHLORIDE SERPL-SCNC: 101 MMOL/L (ref 103–113)
CHLORIDE SERPL-SCNC: 101 MMOL/L (ref 103–113)
CHLORIDE SERPL-SCNC: 103 MMOL/L (ref 103–113)
CHLORIDE SERPL-SCNC: 103 MMOL/L (ref 103–113)
CO2 SERPL-SCNC: 29 MMOL/L (ref 21–32)
CO2 SERPL-SCNC: 29 MMOL/L (ref 21–32)
CO2 SERPL-SCNC: 30 MMOL/L (ref 21–32)
CO2 SERPL-SCNC: 32 MMOL/L (ref 21–32)
CREAT SERPL-MCNC: 0.6 MG/DL (ref 0.8–1.5)
CREAT SERPL-MCNC: 0.7 MG/DL (ref 0.8–1.5)
GLUCOSE BLD STRIP.AUTO-MCNC: 113 MG/DL (ref 65–100)
GLUCOSE BLD STRIP.AUTO-MCNC: 132 MG/DL (ref 65–100)
GLUCOSE BLD STRIP.AUTO-MCNC: 137 MG/DL (ref 65–100)
GLUCOSE BLD STRIP.AUTO-MCNC: 145 MG/DL (ref 65–100)
GLUCOSE SERPL-MCNC: 104 MG/DL (ref 65–100)
GLUCOSE SERPL-MCNC: 125 MG/DL (ref 65–100)
GLUCOSE SERPL-MCNC: 128 MG/DL (ref 65–100)
GLUCOSE SERPL-MCNC: 97 MG/DL (ref 65–100)
HCT VFR BLD AUTO: 21.3 % (ref 41.1–50.3)
HGB BLD-MCNC: 7.1 G/DL (ref 13.6–17.2)
HGB BLD-MCNC: 7.5 G/DL (ref 13.6–17.2)
HISTORY CHECK: NORMAL
MAGNESIUM SERPL-MCNC: 2.1 MG/DL (ref 1.8–2.4)
MRSA DNA SPEC QL NAA+PROBE: NOT DETECTED
POTASSIUM SERPL-SCNC: 3.6 MMOL/L (ref 3.5–5.1)
POTASSIUM SERPL-SCNC: 3.8 MMOL/L (ref 3.5–5.1)
POTASSIUM SERPL-SCNC: 3.9 MMOL/L (ref 3.5–5.1)
POTASSIUM SERPL-SCNC: 3.9 MMOL/L (ref 3.5–5.1)
S AUREUS CPE NOSE QL NAA+PROBE: NOT DETECTED
SERVICE CMNT-IMP: ABNORMAL
SODIUM SERPL-SCNC: 133 MMOL/L (ref 136–146)
SODIUM SERPL-SCNC: 135 MMOL/L (ref 136–146)
SODIUM SERPL-SCNC: 136 MMOL/L (ref 136–146)
SODIUM SERPL-SCNC: 136 MMOL/L (ref 136–146)
SPECIMEN EXP DATE BLD: NORMAL

## 2024-03-24 PROCEDURE — 1100000000 HC RM PRIVATE

## 2024-03-24 PROCEDURE — 99254 IP/OBS CNSLTJ NEW/EST MOD 60: CPT | Performed by: STUDENT IN AN ORGANIZED HEALTH CARE EDUCATION/TRAINING PROGRAM

## 2024-03-24 PROCEDURE — 2580000003 HC RX 258: Performed by: SURGERY

## 2024-03-24 PROCEDURE — 6370000000 HC RX 637 (ALT 250 FOR IP): Performed by: INTERNAL MEDICINE

## 2024-03-24 PROCEDURE — 86923 COMPATIBILITY TEST ELECTRIC: CPT

## 2024-03-24 PROCEDURE — 83735 ASSAY OF MAGNESIUM: CPT

## 2024-03-24 PROCEDURE — 80048 BASIC METABOLIC PNL TOTAL CA: CPT

## 2024-03-24 PROCEDURE — 2580000003 HC RX 258: Performed by: STUDENT IN AN ORGANIZED HEALTH CARE EDUCATION/TRAINING PROGRAM

## 2024-03-24 PROCEDURE — 2580000003 HC RX 258: Performed by: INTERNAL MEDICINE

## 2024-03-24 PROCEDURE — 86900 BLOOD TYPING SEROLOGIC ABO: CPT

## 2024-03-24 PROCEDURE — 99233 SBSQ HOSP IP/OBS HIGH 50: CPT | Performed by: SURGERY

## 2024-03-24 PROCEDURE — 36415 COLL VENOUS BLD VENIPUNCTURE: CPT

## 2024-03-24 PROCEDURE — 2500000003 HC RX 250 WO HCPCS: Performed by: SURGERY

## 2024-03-24 PROCEDURE — 86901 BLOOD TYPING SEROLOGIC RH(D): CPT

## 2024-03-24 PROCEDURE — 82962 GLUCOSE BLOOD TEST: CPT

## 2024-03-24 PROCEDURE — 86850 RBC ANTIBODY SCREEN: CPT

## 2024-03-24 PROCEDURE — 6370000000 HC RX 637 (ALT 250 FOR IP): Performed by: STUDENT IN AN ORGANIZED HEALTH CARE EDUCATION/TRAINING PROGRAM

## 2024-03-24 PROCEDURE — 85018 HEMOGLOBIN: CPT

## 2024-03-24 PROCEDURE — 6360000002 HC RX W HCPCS: Performed by: STUDENT IN AN ORGANIZED HEALTH CARE EDUCATION/TRAINING PROGRAM

## 2024-03-24 PROCEDURE — 85014 HEMATOCRIT: CPT

## 2024-03-24 PROCEDURE — 6360000002 HC RX W HCPCS: Performed by: SURGERY

## 2024-03-24 RX ORDER — BUPRENORPHINE AND NALOXONE 8; 2 MG/1; MG/1
1 FILM, SOLUBLE BUCCAL; SUBLINGUAL 2 TIMES DAILY
Status: DISCONTINUED | OUTPATIENT
Start: 2024-03-24 | End: 2024-03-24 | Stop reason: CLARIF

## 2024-03-24 RX ORDER — PANTOPRAZOLE SODIUM 40 MG/1
40 TABLET, DELAYED RELEASE ORAL
Status: DISCONTINUED | OUTPATIENT
Start: 2024-03-24 | End: 2024-03-27

## 2024-03-24 RX ORDER — LANOLIN ALCOHOL/MO/W.PET/CERES
5 CREAM (GRAM) TOPICAL NIGHTLY
Status: DISCONTINUED | OUTPATIENT
Start: 2024-03-24 | End: 2024-03-28 | Stop reason: HOSPADM

## 2024-03-24 RX ORDER — BUPRENORPHINE HYDROCHLORIDE AND NALOXONE HYDROCHLORIDE DIHYDRATE 8; 2 MG/1; MG/1
1 TABLET SUBLINGUAL 2 TIMES DAILY
Status: DISCONTINUED | OUTPATIENT
Start: 2024-03-24 | End: 2024-03-28 | Stop reason: HOSPADM

## 2024-03-24 RX ORDER — DEXTROSE AND SODIUM CHLORIDE 5; .9 G/100ML; G/100ML
INJECTION, SOLUTION INTRAVENOUS CONTINUOUS
Status: ACTIVE | OUTPATIENT
Start: 2024-03-24 | End: 2024-03-25

## 2024-03-24 RX ADMIN — DOCUSATE SODIUM 50 MG AND SENNOSIDES 8.6 MG 2 TABLET: 8.6; 5 TABLET, FILM COATED ORAL at 09:34

## 2024-03-24 RX ADMIN — Medication 4.5 MG: at 20:39

## 2024-03-24 RX ADMIN — PANTOPRAZOLE SODIUM 40 MG: 40 TABLET, DELAYED RELEASE ORAL at 09:38

## 2024-03-24 RX ADMIN — SODIUM CHLORIDE, PRESERVATIVE FREE 10 ML: 5 INJECTION INTRAVENOUS at 09:34

## 2024-03-24 RX ADMIN — PANTOPRAZOLE SODIUM 40 MG: 40 TABLET, DELAYED RELEASE ORAL at 16:50

## 2024-03-24 RX ADMIN — VENLAFAXINE HYDROCHLORIDE 150 MG: 150 CAPSULE, EXTENDED RELEASE ORAL at 09:34

## 2024-03-24 RX ADMIN — SODIUM CHLORIDE, PRESERVATIVE FREE 10 ML: 5 INJECTION INTRAVENOUS at 20:39

## 2024-03-24 RX ADMIN — DEXTROSE AND SODIUM CHLORIDE: 5; 900 INJECTION, SOLUTION INTRAVENOUS at 09:32

## 2024-03-24 RX ADMIN — DEXTROSE AND SODIUM CHLORIDE: 5; 900 INJECTION, SOLUTION INTRAVENOUS at 23:06

## 2024-03-24 RX ADMIN — BUPRENORPHINE HYDROCHLORIDE AND NALOXONE HYDROCHLORIDE DIHYDRATE 1 TABLET: 8; 2 TABLET SUBLINGUAL at 13:19

## 2024-03-24 RX ADMIN — THIAMINE HYDROCHLORIDE: 100 INJECTION, SOLUTION INTRAMUSCULAR; INTRAVENOUS at 13:16

## 2024-03-24 NOTE — PROGRESS NOTES
Patient is in bed, AxOx4, no pain or distress noted, respirations are even and unlabored on RA, family at bedside, no needs stated, call light is within reach.

## 2024-03-24 NOTE — CONSULTS
Papito Morillo  is 48 y.o. y/o male here for initial evaluation.     PMH of EtOH use disorder presented with abd pain.     CT A&P w contrast:   IMPRESSION:     1. Mild/moderate hemoperitoneum without significant change from yesterday.  2. No active bleeding identified.  3. Cirrhosis with extensive varices in the upper abdomen including prominent  gastric varices.  4. No splenic laceration identified. Changes of chronic pancreatitis without  definite mass.    He reports history of prior GI bleeding but did not have any EGD at that time. Denies any overt signs of GI bleeding at this time.     No past medical history on file.  No past surgical history on file.  No family history on file.     No Known Allergies  Current Outpatient Medications   Medication Instructions    buprenorphine-naloxone (SUBOXONE) 8-2 MG FILM SL film 1 Film, SubLINGual, 2 TIMES DAILY    venlafaxine (EFFEXOR XR) 150 mg, Oral, DAILY     Review of Systems    ROS:    A complete 11 system ROS was performed and was negative aside from the pertinent negative and positives noted above.     PE:   Vitals:    03/24/24 1438   BP: 120/80   Pulse: 66   Resp: 16   Temp: 98.6 °F (37 °C)   SpO2: 94%      General:  The patient appears well-nourished, and is in no acute distress.    Skin:  no rash, ulcers. No Bleeding or signs of infection.  HEENT:  Normocephalic, atraumatic. No sclerae icterus.   Neck:  No pain on palpation or mobilization of the neck.  Respiratory: Respiratory effort is normal. Expansion maintained bilaterally and symmetrically. Normal breath sounds and clear to auscultation bilaterally without wheezes, rales, or rhonchi.    Cardiovascular:  Regular rate and rhythm.     Abdomen:  Soft, non tender to palpation. No distention. Normoactive bowel sounds present.    Extremities: No edema bilaterally. No erythema  Neurologic:  Alert and oriented x3.  No sensory deficits. No asterixis  Psychiatric: Appropriate mood and

## 2024-03-24 NOTE — PROGRESS NOTES
Wants melatonin to help with sleep at night. Spoke with Dr. Smart. New orders to start Melatonin 5mg PO nightly

## 2024-03-24 NOTE — PROGRESS NOTES
Cirrhosis with extensive varices in the upper abdomen including prominent  gastric varices.  4. No splenic laceration identified. Changes of chronic pancreatitis without  definite mass.      If there are any questions about this report, I can be reached on [employer]  or at [phone]    US Result (most recent):  US ABDOMEN LIMITED 03/22/2024    Narrative  RIGHT UPPER QUADRANT  ULTRASOUND    INDICATION: Epigastric pain.    COMPARISON: 1/14/2020.    Transabdominal imaging of the upper abdomen was performed.    FINDINGS:  -LIVER: 19.9 cm. Increased echogenicity and heterogeneity. Perihepatic ascites  is present.  -BILE DUCTS: No intrahepatic bile duct dilatation.  CBD diameter = 5.4 mm.  -GALLBLADDER: Normal size and appearance.  No stones.  -PANCREAS: Poorly visualized.    -RIGHT KIDNEY: 11.5 cm.  No mass or hydronephrosis.  -ABDOMINAL AORTA AND IVC: Normal in size where visualized.  -ASCITES: Mild generalized ascites.    Impression  Enlarged liver with heterogeneity and increased echogenicity. There  is perihepatic and abdominal ascites.      Admission date (for inpatients): 3/22/2024   * No surgery found *  * No surgery found *        ASSESSMENT/PLAN:  [unfilled]  Principal Problem:    LEANDRA (acute kidney injury) (HCC)  Active Problems:    Hyponatremia    Chronic pain    Hypertension    Depression    Hyperglycemia    Tobacco use    Alcohol abuse    Ascites due to alcoholic cirrhosis (HCC)    Hyperbilirubinemia    Perisplenic hematoma    Intraabdominal mass on CT 3/22/24 (between stomach spleen and pancreas)    Sepsis (HCC)  Resolved Problems:    * No resolved hospital problems. *     Patient Active Problem List    Diagnosis Date Noted    Ascites due to alcoholic cirrhosis (HCC) 03/22/2024    Hyperbilirubinemia 03/22/2024    Perisplenic hematoma 03/22/2024    Intraabdominal mass on CT 3/22/24 (between stomach spleen and pancreas) 03/22/2024    Sepsis (HCC) 03/22/2024    Pancreatitis 01/14/2020    Hyponatremia  Risk of Complications and/or Morbidity or Mortality of pt Management     41702  57883 SF Minimal  ?1self-limited or minor problem Minimal or none Minimal risk of morbidity from additional diagnostic testing or Rx   12015  65049 Low Low  ?2or more self-limited or minor problems;    or  ?1stable chronic illness;    or  ?1acute, uncomplicated illness or injury   Limited  (Must meet the requirements of at least 1 of the 2 categories)  Category 1: Tests and documents   ?Any combination of 2 from the following:  ?Review of prior external note(s) from each unique source*;  ?review of the result(s) of each unique test*;   ?ordering of each unique test*    or   Category 2: Assessment requiring an independent historian(s)  (For the categories of independent interpretation of tests and discussion of management or test interpretation, see moderate or high) Low risk of morbidity from additional diagnostic testing or treatment     68947  10740 Mod Moderate  ?1or more chronic illnesses with exacerbation, progression, or side effects of treatment;    or  ?2or more stable chronic illnesses;    or  ?1undiagnosed new problem with uncertain prognosis;    or  ?1acute illness with systemic symptoms;    or  ?1acute complicated injury   Moderate  (Must meet the requirements of at least 1 out of 3 categories)  Category 1: Tests, documents, or independent historian(s)  ?Any combination of 3 from the following:   ?Review of prior external note(s) from each unique source*;  ?Review of the result(s) of each unique test*;  ?Ordering of each unique test*;  ?Assessment requiring an independent historian(s)    or  Category 2: Independent interpretation of tests   ?Independent interpretation of a test performed by another physician/other qualified health care professional (not separately reported);     or  Category 3: Discussion of management or test interpretation  ?Discussion of management or test interpretation with external physician/other

## 2024-03-24 NOTE — PROGRESS NOTES
Bedside report received from night nurse Bing. Assessment done as noted  Respiration even and unlabored 20/min; denies pain or nausea at present. Remains on remote tele with NSR 94 confirmed per monitor tech. Remains NPO per order. Encouraged to call with needs.

## 2024-03-24 NOTE — CARE COORDINATION
Consult placed for rehab.  Patient has requested that CM return later because he has visitors at this time.  CM will continue to follow.

## 2024-03-24 NOTE — PROGRESS NOTES
Hospitalist Progress Note   Admit Date:  3/22/2024 11:03 AM   Name:  Papito Morillo Jr   Age:  48 y.o.  Sex:  male  :  1975   MRN:  [id number]   Room:  [facility]/    Presenting/Chief Complaint: No chief complaint on file.     Reason(s) for Admission: ETOH abuse [F10.10]  Other ascites [R18.8]  LEANDRA (acute kidney injury) (HCC) [N17.9]  Left upper quadrant abdominal mass [R19.02]     Hospital Course:     Papito Morillo Jr is a 48 y.o. male with medical history of ETOH use daily 8-10 nightly, chronic pain on suboxone, tobacco use, who is evaluated with lower abdominal pain 5-6/10  No nausea or emesis  Eating  No trauma  Has been told has prior liver issues  No kristina cirrhosis  Not taking anticoagulation  Has darker urine  No fever  No dyspnea            No prior diabetes      - corrected for hyperglycemia at 129     Creatinine 2.70     CTAP shows in determinant area hypodensity along medial spleen, LLQ mass possibly hematoma along gastrosplenic region with mass effect on stomach to pancreas , moderate ascites         US ABD confirms ascites         HGB 10.8  Total bilirubin 1.4  Platelets 119  WBC elevated         Meets sepsis criteria due to leukocytosis, lactic acidosis.  He was treated with broad-spectrum antibiotics.  Afebrile, no leukocytosis, procalcitonin normal.  Chest x-ray normal.  Blood cultures negative to date.  Discontinued antibiotics.  Repeat CT abdomen pelvis showed hemoperitoneum, cirrhosis and gastric varices.  Hb is 7.1 and s/p  1 unit of PRBC.  GI consulted and started on Protonix..His creatinine improved. IR consulted for paracentesis.     Subjective & 24hr Events:   Patient was seen and examined at the bedside.  He was constipated and last BM was on Tuesday. Family was at the bedside.      Assessment & Plan:   ABLA  Possible gastric varices   No signs of bleeding  Hb is 7.1 and ordered 1 unit of PRBC  Follow-up with posttransfusion CBC  Started Protonix 40 mg

## 2024-03-25 ENCOUNTER — APPOINTMENT (OUTPATIENT)
Dept: MRI IMAGING | Age: 49
DRG: 871 | End: 2024-03-25
Payer: COMMERCIAL

## 2024-03-25 PROBLEM — R19.02 LEFT UPPER QUADRANT ABDOMINAL MASS: Status: ACTIVE | Noted: 2024-03-25

## 2024-03-25 PROBLEM — R18.8 OTHER ASCITES: Status: ACTIVE | Noted: 2024-03-25

## 2024-03-25 PROBLEM — K66.1 HEMOPERITONEUM: Status: ACTIVE | Noted: 2024-03-25

## 2024-03-25 PROBLEM — K74.60 ESOPHAGEAL VARICES IN CIRRHOSIS (HCC): Status: ACTIVE | Noted: 2024-03-22

## 2024-03-25 PROBLEM — I85.10 ESOPHAGEAL VARICES IN CIRRHOSIS (HCC): Status: ACTIVE | Noted: 2024-03-22

## 2024-03-25 PROBLEM — I86.4 GASTRIC VARICES: Status: ACTIVE | Noted: 2024-03-25

## 2024-03-25 LAB
ANION GAP SERPL CALC-SCNC: 2 MMOL/L (ref 2–11)
ANION GAP SERPL CALC-SCNC: 3 MMOL/L (ref 2–11)
BASOPHILS # BLD: 0.1 K/UL (ref 0–0.2)
BASOPHILS NFR BLD: 1 % (ref 0–2)
BUN SERPL-MCNC: 8 MG/DL (ref 6–23)
BUN SERPL-MCNC: 9 MG/DL (ref 6–23)
CALCIUM SERPL-MCNC: 8.7 MG/DL (ref 8.3–10.4)
CALCIUM SERPL-MCNC: 8.8 MG/DL (ref 8.3–10.4)
CHLORIDE SERPL-SCNC: 102 MMOL/L (ref 103–113)
CHLORIDE SERPL-SCNC: 104 MMOL/L (ref 103–113)
CO2 SERPL-SCNC: 30 MMOL/L (ref 21–32)
CO2 SERPL-SCNC: 31 MMOL/L (ref 21–32)
CREAT SERPL-MCNC: 0.7 MG/DL (ref 0.8–1.5)
CREAT SERPL-MCNC: 0.7 MG/DL (ref 0.8–1.5)
DIFFERENTIAL METHOD BLD: ABNORMAL
EOSINOPHIL # BLD: 0.2 K/UL (ref 0–0.8)
EOSINOPHIL NFR BLD: 3 % (ref 0.5–7.8)
ERYTHROCYTE [DISTWIDTH] IN BLOOD BY AUTOMATED COUNT: 11.7 % (ref 11.9–14.6)
GLUCOSE BLD STRIP.AUTO-MCNC: 142 MG/DL (ref 65–100)
GLUCOSE BLD STRIP.AUTO-MCNC: 145 MG/DL (ref 65–100)
GLUCOSE BLD STRIP.AUTO-MCNC: 150 MG/DL (ref 65–100)
GLUCOSE BLD STRIP.AUTO-MCNC: 201 MG/DL (ref 65–100)
GLUCOSE SERPL-MCNC: 109 MG/DL (ref 65–100)
GLUCOSE SERPL-MCNC: 140 MG/DL (ref 65–100)
HCT VFR BLD AUTO: 25.6 % (ref 41.1–50.3)
HGB BLD-MCNC: 8.3 G/DL (ref 13.6–17.2)
IMM GRANULOCYTES # BLD AUTO: 0 K/UL (ref 0–0.5)
IMM GRANULOCYTES NFR BLD AUTO: 0 % (ref 0–5)
LYMPHOCYTES # BLD: 2.3 K/UL (ref 0.5–4.6)
LYMPHOCYTES NFR BLD: 37 % (ref 13–44)
MAGNESIUM SERPL-MCNC: 2.3 MG/DL (ref 1.8–2.4)
MCH RBC QN AUTO: 31.7 PG (ref 26.1–32.9)
MCHC RBC AUTO-ENTMCNC: 32.4 G/DL (ref 31.4–35)
MCV RBC AUTO: 97.7 FL (ref 82–102)
MONOCYTES # BLD: 0.9 K/UL (ref 0.1–1.3)
MONOCYTES NFR BLD: 15 % (ref 4–12)
NEUTS SEG # BLD: 2.6 K/UL (ref 1.7–8.2)
NEUTS SEG NFR BLD: 44 % (ref 43–78)
NRBC # BLD: 0 K/UL (ref 0–0.2)
PHOSPHATE SERPL-MCNC: 4 MG/DL (ref 2.5–4.5)
PLATELET # BLD AUTO: 94 K/UL (ref 150–450)
PMV BLD AUTO: 10 FL (ref 9.4–12.3)
POTASSIUM SERPL-SCNC: 3.8 MMOL/L (ref 3.5–5.1)
POTASSIUM SERPL-SCNC: 4 MMOL/L (ref 3.5–5.1)
RBC # BLD AUTO: 2.62 M/UL (ref 4.23–5.6)
SERVICE CMNT-IMP: ABNORMAL
SODIUM SERPL-SCNC: 134 MMOL/L (ref 136–146)
SODIUM SERPL-SCNC: 138 MMOL/L (ref 136–146)
WBC # BLD AUTO: 6 K/UL (ref 4.3–11.1)

## 2024-03-25 PROCEDURE — 80048 BASIC METABOLIC PNL TOTAL CA: CPT

## 2024-03-25 PROCEDURE — 83735 ASSAY OF MAGNESIUM: CPT

## 2024-03-25 PROCEDURE — 6370000000 HC RX 637 (ALT 250 FOR IP): Performed by: STUDENT IN AN ORGANIZED HEALTH CARE EDUCATION/TRAINING PROGRAM

## 2024-03-25 PROCEDURE — 99232 SBSQ HOSP IP/OBS MODERATE 35: CPT | Performed by: STUDENT IN AN ORGANIZED HEALTH CARE EDUCATION/TRAINING PROGRAM

## 2024-03-25 PROCEDURE — 74183 MRI ABD W/O CNTR FLWD CNTR: CPT

## 2024-03-25 PROCEDURE — 84100 ASSAY OF PHOSPHORUS: CPT

## 2024-03-25 PROCEDURE — 85025 COMPLETE CBC W/AUTO DIFF WBC: CPT

## 2024-03-25 PROCEDURE — 2580000003 HC RX 258: Performed by: STUDENT IN AN ORGANIZED HEALTH CARE EDUCATION/TRAINING PROGRAM

## 2024-03-25 PROCEDURE — 97161 PT EVAL LOW COMPLEX 20 MIN: CPT

## 2024-03-25 PROCEDURE — 2580000003 HC RX 258: Performed by: INTERNAL MEDICINE

## 2024-03-25 PROCEDURE — 36415 COLL VENOUS BLD VENIPUNCTURE: CPT

## 2024-03-25 PROCEDURE — 1100000000 HC RM PRIVATE

## 2024-03-25 PROCEDURE — 6360000002 HC RX W HCPCS: Performed by: STUDENT IN AN ORGANIZED HEALTH CARE EDUCATION/TRAINING PROGRAM

## 2024-03-25 PROCEDURE — 99232 SBSQ HOSP IP/OBS MODERATE 35: CPT | Performed by: SURGERY

## 2024-03-25 PROCEDURE — 2500000003 HC RX 250 WO HCPCS: Performed by: STUDENT IN AN ORGANIZED HEALTH CARE EDUCATION/TRAINING PROGRAM

## 2024-03-25 PROCEDURE — 97165 OT EVAL LOW COMPLEX 30 MIN: CPT

## 2024-03-25 PROCEDURE — 6360000004 HC RX CONTRAST MEDICATION: Performed by: PHYSICIAN ASSISTANT

## 2024-03-25 PROCEDURE — A9579 GAD-BASE MR CONTRAST NOS,1ML: HCPCS | Performed by: PHYSICIAN ASSISTANT

## 2024-03-25 PROCEDURE — 82962 GLUCOSE BLOOD TEST: CPT

## 2024-03-25 PROCEDURE — 6370000000 HC RX 637 (ALT 250 FOR IP): Performed by: INTERNAL MEDICINE

## 2024-03-25 PROCEDURE — 99222 1ST HOSP IP/OBS MODERATE 55: CPT | Performed by: PHYSICIAN ASSISTANT

## 2024-03-25 PROCEDURE — 97530 THERAPEUTIC ACTIVITIES: CPT

## 2024-03-25 RX ADMIN — Medication 4.5 MG: at 21:19

## 2024-03-25 RX ADMIN — GADOTERIDOL 20 ML: 279.3 INJECTION, SOLUTION INTRAVENOUS at 12:18

## 2024-03-25 RX ADMIN — OCTREOTIDE ACETATE 25 MCG/HR: 500 INJECTION, SOLUTION INTRAVENOUS; SUBCUTANEOUS at 09:55

## 2024-03-25 RX ADMIN — SODIUM CHLORIDE, PRESERVATIVE FREE 10 ML: 5 INJECTION INTRAVENOUS at 09:17

## 2024-03-25 RX ADMIN — SODIUM CHLORIDE, PRESERVATIVE FREE 10 ML: 5 INJECTION INTRAVENOUS at 21:21

## 2024-03-25 RX ADMIN — PANTOPRAZOLE SODIUM 40 MG: 40 TABLET, DELAYED RELEASE ORAL at 05:56

## 2024-03-25 RX ADMIN — PANTOPRAZOLE SODIUM 40 MG: 40 TABLET, DELAYED RELEASE ORAL at 17:15

## 2024-03-25 RX ADMIN — INSULIN LISPRO 1 UNITS: 100 INJECTION, SOLUTION INTRAVENOUS; SUBCUTANEOUS at 17:15

## 2024-03-25 RX ADMIN — DOCUSATE SODIUM 50 MG AND SENNOSIDES 8.6 MG 2 TABLET: 8.6; 5 TABLET, FILM COATED ORAL at 08:48

## 2024-03-25 RX ADMIN — BUPRENORPHINE HYDROCHLORIDE AND NALOXONE HYDROCHLORIDE DIHYDRATE 1 TABLET: 8; 2 TABLET SUBLINGUAL at 08:48

## 2024-03-25 RX ADMIN — VENLAFAXINE HYDROCHLORIDE 150 MG: 150 CAPSULE, EXTENDED RELEASE ORAL at 08:48

## 2024-03-25 RX ADMIN — SODIUM CHLORIDE, PRESERVATIVE FREE 10 ML: 5 INJECTION INTRAVENOUS at 12:18

## 2024-03-25 RX ADMIN — TUBERCULIN PURIFIED PROTEIN DERIVATIVE 5 UNITS: 5 INJECTION, SOLUTION INTRADERMAL at 08:49

## 2024-03-25 RX ADMIN — CEFTRIAXONE SODIUM 2000 MG: 2 INJECTION, POWDER, FOR SOLUTION INTRAMUSCULAR; INTRAVENOUS at 08:55

## 2024-03-25 ASSESSMENT — PAIN SCALES - GENERAL: PAINLEVEL_OUTOF10: 4

## 2024-03-25 ASSESSMENT — PAIN DESCRIPTION - LOCATION: LOCATION: BACK

## 2024-03-25 NOTE — CONSULTS
[facility]  Department of Interventional Radiology  [phone]        Consult Note     Patient: Papito Morillo Jr MRN: [id number]  SSN: [id number]    YOB: 1975  Age: 48 y.o.  Sex: male      Referring Physician: Di Smart MD    Consult Date: 3/25/2024     Subjective:     Chief Complaint: Abdominal pain    History of Present Illness: Papito Morillo Jr is a 48 y.o. male who is seen in consultation for hemoperitoneum.    Patient was admitted on 3-22 for EtOH abuse, ascites, LEANDRA, and left upper quadrant abdominal mass.  Patient has history of EtOH abuse drinking 8-10 drinks daily, chronic pain on Suboxone, chronic pancreatitis, and tobacco abuse.  Patient states that he had been having abdominal pain for about a week and thought that it was a flareup of his pancreatitis.      Upon evaluation in the ER he was mildly tachycardic and with a white blood cell count of 14,700, elevated lactic acid, and creatinine of 2.7 up from baseline around 0.6.  CT scan of the abdomen and pelvis on 3- revealed a masslike structure at the gastric fundus/proximal gastric body measuring 7.6 cm.  Compared to the previous CT the day before there is no internal enhancement and it was suspected this may be a mural based hematoma which would fit with the known hemoperitoneum seen on prior imaging.  Other considerations were hemorrhagic gastrointestinal stromal tumor and hemorrhagic pseudocyst.  Our department was consulted to weigh in on potential recommendations for his hemoperitoneum.    Patient states he has not had any blood in his stools and denies hematemesis.  Hemoglobin has improved from 7.1 yesterday to 8.3 today and he denies receiving blood products.    Social History     Tobacco Use    Smoking status: Not on file    Smokeless tobacco: Not on file   Substance Use Topics    Alcohol use: Not on file      No Known Allergies  Current Facility-Administered Medications  Q1H PRN Adwoa John MD        Or    LORazepam (ATIVAN) 3 mg in sodium chloride (PF) 0.9 % 10 mL injection  3 mg IntraVENous Q1H PRN Adwoa John MD        Or    LORazepam (ATIVAN) tablet 4 mg  4 mg Oral Q1H PRN Adwoa John MD        Or    LORazepam (ATIVAN) 4 mg in sodium chloride (PF) 0.9 % 10 mL injection  4 mg IntraVENous Q1H PRN Adwoa John MD        glucose chewable tablet 16 g  4 tablet Oral PRN Adwoa John MD        dextrose bolus 10% 125 mL  125 mL IntraVENous PRN Adwoa John MD        Or    dextrose bolus 10% 250 mL  250 mL IntraVENous PRN Adwoa John MD        Glucagon Emergency KIT 1 mg  1 mg SubCUTAneous PRN Adwoa John MD        dextrose 10 % infusion   IntraVENous Continuous PRN Adwoa John MD        insulin lispro (HUMALOG) injection vial 0-4 Units  0-4 Units SubCUTAneous TID WC Adwoa John MD        insulin lispro (HUMALOG) injection vial 0-4 Units  0-4 Units SubCUTAneous Nightly Adwoa John MD        nicotine (NICODERM CQ) 14 MG/24HR 1 patch  1 patch TransDERmal Daily Adwoa John MD   1 patch at 03/25/24 0848         Prior to Admission medications    Medication Sig Start Date End Date Taking? Authorizing Provider   buprenorphine-naloxone (SUBOXONE) 8-2 MG FILM SL film Place 1 Film under the tongue 2 times daily.   Yes Automatic Reconciliation, Ar   venlafaxine (EFFEXOR XR) 150 MG extended release capsule Take 1 capsule by mouth daily   Yes Automatic Reconciliation, Ar        No Known Allergies    Review of Systems:  Pertinent items are noted in HPI.    Objective:     Physical Exam:  Vitals:    03/25/24 0315 03/25/24 0715 03/25/24 1032 03/25/24 1137   BP: 120/77 126/76  115/80   Pulse: 69 77  66   Resp: 16 16  18   Temp: 97.9 °F (36.6 °C) 99 °F (37.2 °C)  99 °F (37.2 °C)   TempSrc:  Oral Oral Oral   SpO2: 91% 94%  95%   Weight:       Height:            Pain Assessment:    4 in left

## 2024-03-25 NOTE — PROGRESS NOTES
I spoke to radiologist who states patient has a pseudoaneurysm and pancreas and stomach wall. Recommends IR to see in AM for angiogram. Consult order in.

## 2024-03-25 NOTE — ACP (ADVANCE CARE PLANNING)
Advance Care Planning     General Advance Care Planning (ACP) Conversation    Date of Conversation: 3/22/2024  Conducted with: Patient with Decision Making Capacity    Healthcare Decision Maker:    Primary Decision Maker: Kaitlyn Morillo - [facility] - [phone]  Click here to complete Healthcare Decision Makers including selection of the Healthcare Decision Maker Relationship (ie \"Primary\").  Today we documented Decision Maker(s) consistent with Legal Next of Kin hierarchy.    Content/Action Overview:  Patient is ready to consider care preferences-refer to ACP Clinical Specialist  Reviewed DNR/DNI and patient elects Full Code (Attempt Resuscitation)        Length of Voluntary ACP Conversation in minutes:  <16 minutes (Non-Billable)    Eliza Chavez RN

## 2024-03-25 NOTE — PROGRESS NOTES
Patient arrival to floor from MRI. Patient settled back into bed, IV infusions restarted and telemetry reapplied. Instructed to call for assistance. Call light is within reach.

## 2024-03-25 NOTE — DIABETES MGMT
Patient admitted with LEANDRA. Admitting blood glucose 7.2 (), likely skewed as hgb 8.5. Blood glucose ranged 104-145 yesterday with patient receiving no diabetes medications. Noted patient has been NPO and on liquid diets while hospitalized. Blood glucose this morning was 142. Patient father and mother at bedside who state last glucose was 150. Patient states he has a juice and sugar free popsicle since then. Creatinine 0.70. GFR >60. Reviewed patient current regimen: Humalog correctional insulin.    Patient seen for diabetes education assessment. Patient denies previous history of diabetes or prediabetes. Patient mother states she has distant relatives with diabetes. Per chart review patient has history of cirrhosis, pancreatitis, alcohol abuse, HTN which may limit medication options for patient for treatment of diabetes. Discussed with patient need for diabetes education. Patient in agreement with diabetes education. Plan to follow up with patient for glucometer instruction and diabetes survival skill training tomorrow as patient condition allows. Patient in agreement with plan.

## 2024-03-25 NOTE — PROGRESS NOTES
Patient sitting in bed awake with family at bedside. Patient on room air, alert and oriented, no complaints of pain, nausea, or SOB at this time. Octreotide infusing continuously at 5mL an hour. Patient instructed to call for assistance. Call light is in reach.

## 2024-03-25 NOTE — PROGRESS NOTES
ACUTE PHYSICAL THERAPY GOALS:   (Developed with and agreed upon by patient and/or caregiver.)    (1.) Papito Morillo Jr  will move from supine to sit and sit to supine , scoot up and down, and roll side to side with SUPERVISION within 3 treatment day(s).  MET  (2.) Papito Morillo Jr will transfer from bed to chair and chair to bed with SUPERVISION using the least restrictive device within 3 treatment day(s).  MET  (3.) Papito Morillo Jr will ambulate with SUPERVISION for 500 feet with the least restrictive device within 3 treatment day(s). MET      PHYSICAL THERAPY Initial Assessment, Daily Note, Discharge, and PM  (Link to Caseload Tracking: PT Visit Days : 1  Acknowledge Orders  Time In/Out  PT Charge Capture  Rehab Caseload Tracker    Papito Morillo Jr is a 48 y.o. male   PRIMARY DIAGNOSIS: LEANDRA (acute kidney injury) (HCC)  ETOH abuse [F10.10]  Other ascites [R18.8]  LEANDRA (acute kidney injury) (HCC) [N17.9]  Left upper quadrant abdominal mass [R19.02]  Procedure(s) (LRB):  ESOPHAGOGASTRODUODENOSCOPY (N/A)     Reason for Referral: Generalized Muscle Weakness (M62.81)  Inpatient: Payor: DAHIANA / Plan: [facility] / Product Type: *No Product type* /     ASSESSMENT:     REHAB RECOMMENDATIONS:   Recommendation to date pending progress:  Setting:  No further skilled physical therapy after discharge from hospital    Equipment:    None     ASSESSMENT:   Mr. Yisel Valencia is a 48 year old male who presents to hospital secondary to above diagnosis. Pt reports that he lives with parents in 1 level home with 2 JOSE F with B rails, has RW, Cane and BSC but was I with Mob and ADLs PTA. This date pt performs mobility including STS, t/fs and amb 500' no AD with supervision for safety. Pt presents as functioning at his baseline, no acute PT intervention is indicated at this time. Will discharge from PT caseload. Please re-consult if any new needs arise. Thank you. .     [facility]  [] Patient Tolerated treatment well    []      COGNITION/  PERCEPTION: Intact Impaired (Comments):   Orientation []     Vision []     Hearing []     Cognition  []       MOBILITY: I Mod I S SBA CGA Min Mod Max Total  NT x2 Comments:   Bed Mobility    Rolling [] [] [] [] [] [] [] [] [] [] []    Supine to Sit [] [] [] [] [] [] [] [] [] [] []    Scooting [] [] [] [] [] [] [] [] [] [] []    Sit to Supine [] [] [] [] [] [] [] [] [] [] []    Transfers    Sit to Stand [] [] [x] [] [] [] [] [] [] [] []    Bed to Chair [] [] [] [] [] [] [] [] [] [] []    Stand to Sit [] [] [x] [] [] [] [] [] [] [] []     [] [] [] [] [] [] [] [] [] [] []    I=Independent, Mod I=Modified Independent, S=Supervision, SBA=Standby Assistance, CGA=Contact Guard Assistance,   Min=Minimal Assistance, Mod=Moderate Assistance, Max=Maximal Assistance, Total=Total Assistance, NT=Not Tested    GAIT: I Mod I S SBA CGA Min Mod Max Total  NT x2 Comments:   Level of Assistance [] [] [x] [] [] [] [] [] [] [] []    Distance 500  feet    DME None    Gait Quality Decreased roque  and Decreased step length    Weightbearing Status      Stairs      I=Independent, Mod I=Modified Independent, S=Supervision, SBA=Standby Assistance, CGA=Contact Guard Assistance,   Min=Minimal Assistance, Mod=Moderate Assistance, Max=Maximal Assistance, Total=Total Assistance, NT=Not Tested    PLAN:   FREQUENCY AND DURATION:  (eval and d/c) for duration of hospital stay or until stated goals are met, whichever comes first.    THERAPY PROGNOSIS: Good    PROBLEM LIST:   (Skilled intervention is medically necessary to address:)  Decreased Activity Tolerance  Decreased Balance  Decreased Gait Ability INTERVENTIONS PLANNED:   (Benefits and precautions of physical therapy have been discussed with the patient.)  Therapeutic Exercise/HEP  Gait Training  Education       TREATMENT:   EVALUATION: LOW COMPLEXITY: (Untimed Charge)  The initial evaluation charge encompasses clinical chart review,

## 2024-03-25 NOTE — CARE COORDINATION
MSN, CM:  spoke with patient and his parents this afternoon about discharge planning.  Patient lives with his parents in their home with 3 steps for entrance.  Patient is independent with all ADL's and requires no equipment for ambulation.  Patient denies any HH, rehab or home oxygen currently.  Patient is unemployed but able to drive himself.  Patient has no PCP; BS referred.  Patient's mother states patient retired with a big severance pay when they retired.  Mother is also concerned that patient will be billed for this stay r/t no income from patient currently coming in.  She states patient was hospitalized 4 years ago and she filled out sponsorship but bill got turned over to collections.  This CM informed her to go downstairs and speak with the .  Patient questioned about ETOH rehab which patient states \"No, I do not think so\".  Patent was then asked if he wanted to speak with Leo (FAVOR) in which patient states he has already and still declines ETOH rehab. Patient states \"I will be fine\".  Parents at bedside.  Case Management will continue to follow for any discharge needs.       03/25/24 6902   Service Assessment   Patient Orientation Alert and Oriented   Cognition Alert   History Provided By Patient   Primary Caregiver Self   Accompanied By/Relationship Mom and Dad   Support Systems Parent   Patient's Healthcare Decision Maker is: Legal Next of Kin   PCP Verified by CM No  ([facility] referred)   Prior Functional Level Independent in ADLs/IADLs   Current Functional Level Independent in ADLs/IADLs   Can patient return to prior living arrangement Yes   Ability to make needs known: Good   Family able to assist with home care needs: Yes   Would you like for me to discuss the discharge plan with any other family members/significant others, and if so, who? Yes  (Parents)   Financial Resources Other (Comment)  (BCBS)   Community Resources None   CM/SW Referral No PCP   Social/Functional History   Lives With  Parent   Type of Home House   Home Layout One level   Home Access Stairs to enter with rails   Entrance Stairs - Number of Steps 2   Bathroom Shower/Tub Walk-in shower;Tub/Shower unit   Bathroom Toilet Standard   Bathroom Equipment None   Bathroom Accessibility Accessible   Home Equipment None   Receives Help From Family   ADL Assistance Independent   Homemaking Assistance Independent   Homemaking Responsibilities Yes   Ambulation Assistance Independent   Transfer Assistance Independent   Active  Yes   Mode of Transportation Car   Occupation Unemployed   Discharge Planning   Type of Residence House   Living Arrangements Parent   Current Services Prior To Admission None   DME Ordered? No   Potential Assistance Purchasing Medications No   Type of Home Care Services None   Patient expects to be discharged to: House   One/Two Story Residence One story   History of falls? 0

## 2024-03-25 NOTE — PROGRESS NOTES
H&P/Consult Note/Progress Note/Office Note:   Papito Morillo Jr  MRN: [id number]  :1975  Age:48 y.o.    HPI: Papito Morillo Jr is a 48 y.o. male with h/o daily, heavy ETOH use and pancreatitis   who came to the ER on 3/22/24 c/o 4 day h/o progressive abd pain with nausea but no vomiting.  Little relief with Gas-x and Pepcid.  Drinks 12 beers/day  Has h/o pancreatitis and thrombocytopenia (plt 88k on 3/20/22)      In ER he was AF  WBC 14.7k, Hgb 10.8, Plt 119  BUN/Cr 28/2.7  T bili 1.4  AST/ALT 28/39  Alk phosp 86  Alb 3.5  Lipase 148    CT without contrast and US as below  He was admitted by Hospitalists   Gen Surgery was consulted      3/22/24 CT abd/pelvis without contrast       The unenhanced liver, gallbladder, adrenals and kidneys are unremarkable.  Hypodense area is noted along the medial aspect of the spleen, it is unclear if  this represents subcapsular fluid or parenchymal abnormality of the spleen, and  measures about 9.1 cm in AP dimension and 4.3 cm in transverse dimension.  Pancreatic calcifications noted in keeping with sequela of chronic pancreatitis.  There is no CT evidence of acute pancreatitis. Urinary bladder only contains a  small amount of fluid and is not adequately distended. There is moderate ascites  within the abdomen and pelvis. There is a left upper quadrant mass in the  gastrosplenic region creating mass effect on the greater curvature of the  stomach and extending inferiorly adjacent to the distal body and tail the  pancreas with this mass measuring about 7 cm in AP dimension and 7 cm in  transverse dimension showing Hounsfield units of 56, most suggestive of a  hematoma. There is no free intraperitoneal air or fluid. Recanalized umbilical  vein and upper abdominal portosystemic collaterals suggested in keeping with  portal venous hypertension. There are vascular calcifications along the  abdominal aorta and iliac arteries without aneurysmal dilatation.

## 2024-03-25 NOTE — PROGRESS NOTES
Papito Morillo Jr is 48 y.o. y/o male     Initial consult: See consult note from 3/24: Patient, with hx of ETOH use, presenting with abdominal pain with CT concerning for cirrhosis with extensive varices in upper abdomen (including gastric), hemoperitoneum and also showing mass-like structure at the gastric fundus/proximal gastric body, possible hematoma from variceal bleeding vs stromal tumor. No obvious blood in stool. Seen by general surgery, and recommend OP EUS at some point for further evaluation.    Today: Seen by IR, pending MRCP to further characterize gastric mass. Possible TIPS procedure pending MRI and EGD results. Patient has not had any BM since admission; no vomiting.     Labs: Hgb 8.3 (7.5)    Medications: Rocephin, IV PPI BID, Octreotide.     PE:   Vitals:    03/25/24 1504   BP: 126/84   Pulse: 68   Resp: 16   Temp: 98.2 °F (36.8 °C)   SpO2: 96%      General:  The patient appears well-nourished, and is in no acute distress.    HEENT:  Normocephalic, atraumatic. No sclerae icterus.   Neurologic:  Alert and oriented x3.  Psychiatric: Appropriate mood and affect.    Assessment and Plan:   #Cirrhosis with varices on CT scan: Will plan for EGD tomorrow for further evaluation. Hgb stable at 8.3 with no overt bleeding. Please continue with PPI, octreotide, rocephin. Will make NPO at [place].     #Gastric Mass/hemoperitoneum: IR pending MRCP. Will plan for possible TIPS procedure after EGD tomorrow.     Electronically signed by Venecia Cullen PA-C.

## 2024-03-25 NOTE — PROGRESS NOTES
Patient lying in bed awake. Family at bedside, Alert and oriented x4 on room air. Patient denies any pain and indicates no needs at this time. Call light is in reach. Care ongoing.

## 2024-03-25 NOTE — PROGRESS NOTES
Hospitalist Progress Note   Admit Date:  3/22/2024 11:03 AM   Name:  Papito Morillo Jr   Age:  48 y.o.  Sex:  male  :  1975   MRN:  [id number]   Room:  [facility]/    Presenting/Chief Complaint: No chief complaint on file.     Reason(s) for Admission: ETOH abuse [F10.10]  Other ascites [R18.8]  LEANDRA (acute kidney injury) (HCC) [N17.9]  Left upper quadrant abdominal mass [R19.02]     Hospital Course:     Papito Morillo Jr is a 48 y.o. male with medical history of ETOH use daily 8-10 nightly, chronic pain on suboxone, tobacco use, who is evaluated with lower abdominal pain 5-6/10  No nausea or emesis  Eating  No trauma  Has been told has prior liver issues  No kristina cirrhosis  Not taking anticoagulation  Has darker urine  No fever  No dyspnea            No prior diabetes      - corrected for hyperglycemia at 129     Creatinine 2.70     CTAP shows in determinant area hypodensity along medial spleen, LLQ mass possibly hematoma along gastrosplenic region with mass effect on stomach to pancreas , moderate ascites         US ABD confirms ascites         HGB 10.8  Total bilirubin 1.4  Platelets 119  WBC elevated         Meets sepsis criteria due to leukocytosis, lactic acidosis.  He was treated with broad-spectrum antibiotics.  Afebrile, no leukocytosis, procalcitonin normal.  Chest x-ray normal.  Blood cultures negative to date.  Discontinued antibiotics.  Repeat CT abdomen pelvis showed hemoperitoneum, cirrhosis and gastric varices.  Hb is 7.1 and s/p  1 unit of PRBC.  GI consulted and started on Protonix..His creatinine improved. IR consulted for paracentesis. Iron, FA and B12 was normal.    Subjective & 24hr Events:   Patient was seen and examined at the bedside.   Family was at the bedside.      Assessment & Plan:   ABLA  Possible gastric varices   Hb is 7.1 and repeat hemoglobin is 8.3  Continue Protonix 40 mg IV twice daily  Monitor H&H every 6hrs  N.p.o. for now  Continue D5 plus  Value Ref Range    POC Glucose 145 (H) 65 - 100 mg/dL    Performed by: Elicia    Basic Metabolic Panel    Collection Time: 03/24/24 10:32 PM   Result Value Ref Range    Sodium 136 136 - 146 mmol/L    Potassium 3.6 3.5 - 5.1 mmol/L    Chloride 103 103 - 113 mmol/L    CO2 30 21 - 32 mmol/L    Anion Gap 3 2 - 11 mmol/L    Glucose 104 (H) 65 - 100 mg/dL    BUN 11 6 - 23 MG/DL    Creatinine 0.60 (L) 0.8 - 1.5 MG/DL    Est, Glom Filt Rate >60 >60 ml/min/1.73m2    Calcium 8.3 8.3 - 10.4 MG/DL   Basic Metabolic Panel    Collection Time: 03/25/24  3:22 AM   Result Value Ref Range    Sodium 138 136 - 146 mmol/L    Potassium 3.8 3.5 - 5.1 mmol/L    Chloride 104 103 - 113 mmol/L    CO2 31 21 - 32 mmol/L    Anion Gap 3 2 - 11 mmol/L    Glucose 109 (H) 65 - 100 mg/dL    BUN 9 6 - 23 MG/DL    Creatinine 0.70 (L) 0.8 - 1.5 MG/DL    Est, Glom Filt Rate >60 >60 ml/min/1.73m2    Calcium 8.7 8.3 - 10.4 MG/DL   CBC with Auto Differential    Collection Time: 03/25/24  3:22 AM   Result Value Ref Range    WBC 6.0 4.3 - 11.1 K/uL    RBC 2.62 (L) 4.23 - 5.6 M/uL    Hemoglobin 8.3 (L) 13.6 - 17.2 g/dL    Hematocrit 25.6 (L) 41.1 - 50.3 %    MCV 97.7 82 - 102 FL    MCH 31.7 26.1 - 32.9 PG    MCHC 32.4 31.4 - 35.0 g/dL    RDW 11.7 (L) 11.9 - 14.6 %    Platelets 94 (L) 150 - 450 K/uL    MPV 10.0 9.4 - 12.3 FL    nRBC 0.00 0.0 - 0.2 K/uL    Differential Type AUTOMATED      Neutrophils % 44 43 - 78 %    Lymphocytes % 37 13 - 44 %    Monocytes % 15 (H) 4.0 - 12.0 %    Eosinophils % 3 0.5 - 7.8 %    Basophils % 1 0.0 - 2.0 %    Immature Granulocytes 0 0.0 - 5.0 %    Neutrophils Absolute 2.6 1.7 - 8.2 K/UL    Lymphocytes Absolute 2.3 0.5 - 4.6 K/UL    Monocytes Absolute 0.9 0.1 - 1.3 K/UL    Eosinophils Absolute 0.2 0.0 - 0.8 K/UL    Basophils Absolute 0.1 0.0 - 0.2 K/UL    Absolute Immature Granulocyte 0.0 0.0 - 0.5 K/UL   Magnesium    Collection Time: 03/25/24  3:22 AM   Result Value Ref Range    Magnesium 2.3 1.8 - 2.4 mg/dL

## 2024-03-25 NOTE — PROGRESS NOTES
ACUTE OCCUPATIONAL THERAPY GOALS:   (Developed with and agreed upon by patient and/or caregiver.)  1. Pt will complete LB ADL set up only.  2. Pt will tolerate at least 8 minutes of OT treatment requiring 1-2 breaks as needed.   3. Pt will complete functional mobility I.  ALL GOALS MET.    OCCUPATIONAL THERAPY Initial Assessment, Daily Note, Discharge, and PM       OT Visit Days: 1  Acknowledge Orders  Time  OT Charge Capture  Rehab Caseload Tracker      Papito Morillo Jr is a 48 y.o. male   PRIMARY DIAGNOSIS: LEANDRA (acute kidney injury) (HCC)  ETOH abuse [F10.10]  Other ascites [R18.8]  LEANDRA (acute kidney injury) (HCC) [N17.9]  Left upper quadrant abdominal mass [R19.02]       Reason for Referral: Generalized Muscle Weakness (M62.81)  Inpatient: Payor: DAHIANA / Plan: [facility]OM [facility]AL[facility] / Product Type: *No Product type* /     ASSESSMENT:     REHAB RECOMMENDATIONS:   Recommendation to date pending progress:  Setting:  No further skilled occupational therapy after discharge from hospital    Equipment:    None     ASSESSMENT:  Mr. Yisel Valencia is a 48 y M with a hx of ETOH. Pt was admitted for pancreatitis. Pt was received supine in bed with slight abdominal discomfort but no rated pain and slight jaundice. No obvious ascites. Pt is functioning at baseline. Pt is not appropriate for skilled acute OT services at this time. OT will DC.       [facility] AM-PAC™ “6 Clicks” Daily Activity Inpatient Short Form:    AM-PAC Daily Activity - Inpatient   How much help is needed for putting on and taking off regular lower body clothing?: None  How much help is needed for bathing (which includes washing, rinsing, drying)?: None  How much help is needed for toileting (which includes using toilet, bedpan, or urinal)?: None  How much help is needed for putting on and taking off regular upper body clothing?: None  How much help is needed for taking care of personal grooming?: None  How much help for eating meals?:

## 2024-03-26 ENCOUNTER — ANESTHESIA (OUTPATIENT)
Dept: ENDOSCOPY | Age: 49
DRG: 871 | End: 2024-03-26
Payer: COMMERCIAL

## 2024-03-26 ENCOUNTER — ANESTHESIA EVENT (OUTPATIENT)
Dept: ENDOSCOPY | Age: 49
DRG: 871 | End: 2024-03-26
Payer: COMMERCIAL

## 2024-03-26 LAB
ANION GAP SERPL CALC-SCNC: 4 MMOL/L (ref 2–11)
BASOPHILS # BLD: 0 K/UL (ref 0–0.2)
BASOPHILS NFR BLD: 1 % (ref 0–2)
BUN SERPL-MCNC: 6 MG/DL (ref 6–23)
CALCIUM SERPL-MCNC: 9 MG/DL (ref 8.3–10.4)
CHLORIDE SERPL-SCNC: 101 MMOL/L (ref 103–113)
CO2 SERPL-SCNC: 32 MMOL/L (ref 21–32)
CREAT SERPL-MCNC: 0.7 MG/DL (ref 0.8–1.5)
DIFFERENTIAL METHOD BLD: ABNORMAL
EOSINOPHIL # BLD: 0.2 K/UL (ref 0–0.8)
EOSINOPHIL NFR BLD: 4 % (ref 0.5–7.8)
ERYTHROCYTE [DISTWIDTH] IN BLOOD BY AUTOMATED COUNT: 11.9 % (ref 11.9–14.6)
GLUCOSE BLD STRIP.AUTO-MCNC: 103 MG/DL (ref 65–100)
GLUCOSE BLD STRIP.AUTO-MCNC: 122 MG/DL (ref 65–100)
GLUCOSE BLD STRIP.AUTO-MCNC: 125 MG/DL (ref 65–100)
GLUCOSE BLD STRIP.AUTO-MCNC: 189 MG/DL (ref 65–100)
GLUCOSE SERPL-MCNC: 102 MG/DL (ref 65–100)
HCT VFR BLD AUTO: 25.6 % (ref 41.1–50.3)
HGB BLD-MCNC: 8.4 G/DL (ref 13.6–17.2)
IMM GRANULOCYTES # BLD AUTO: 0 K/UL (ref 0–0.5)
IMM GRANULOCYTES NFR BLD AUTO: 0 % (ref 0–5)
LYMPHOCYTES # BLD: 1.6 K/UL (ref 0.5–4.6)
LYMPHOCYTES NFR BLD: 39 % (ref 13–44)
MAGNESIUM SERPL-MCNC: 2.3 MG/DL (ref 1.8–2.4)
MCH RBC QN AUTO: 31.8 PG (ref 26.1–32.9)
MCHC RBC AUTO-ENTMCNC: 32.8 G/DL (ref 31.4–35)
MCV RBC AUTO: 97 FL (ref 82–102)
MM INDURATION, POC: 0 MM (ref 0–5)
MONOCYTES # BLD: 0.7 K/UL (ref 0.1–1.3)
MONOCYTES NFR BLD: 17 % (ref 4–12)
NEUTS SEG # BLD: 1.6 K/UL (ref 1.7–8.2)
NEUTS SEG NFR BLD: 39 % (ref 43–78)
NRBC # BLD: 0 K/UL (ref 0–0.2)
PHOSPHATE SERPL-MCNC: 4.2 MG/DL (ref 2.5–4.5)
PLATELET # BLD AUTO: 109 K/UL (ref 150–450)
PMV BLD AUTO: 10.2 FL (ref 9.4–12.3)
POTASSIUM SERPL-SCNC: 3.9 MMOL/L (ref 3.5–5.1)
PPD, POC: NEGATIVE
RBC # BLD AUTO: 2.64 M/UL (ref 4.23–5.6)
SERVICE CMNT-IMP: ABNORMAL
WBC # BLD AUTO: 4.2 K/UL (ref 4.3–11.1)

## 2024-03-26 PROCEDURE — 2580000003 HC RX 258: Performed by: ANESTHESIOLOGY

## 2024-03-26 PROCEDURE — 1100000000 HC RM PRIVATE

## 2024-03-26 PROCEDURE — 84100 ASSAY OF PHOSPHORUS: CPT

## 2024-03-26 PROCEDURE — 3700000000 HC ANESTHESIA ATTENDED CARE: Performed by: STUDENT IN AN ORGANIZED HEALTH CARE EDUCATION/TRAINING PROGRAM

## 2024-03-26 PROCEDURE — 7100000010 HC PHASE II RECOVERY - FIRST 15 MIN: Performed by: STUDENT IN AN ORGANIZED HEALTH CARE EDUCATION/TRAINING PROGRAM

## 2024-03-26 PROCEDURE — 6360000002 HC RX W HCPCS: Performed by: NURSE ANESTHETIST, CERTIFIED REGISTERED

## 2024-03-26 PROCEDURE — 6370000000 HC RX 637 (ALT 250 FOR IP): Performed by: FAMILY MEDICINE

## 2024-03-26 PROCEDURE — 85025 COMPLETE CBC W/AUTO DIFF WBC: CPT

## 2024-03-26 PROCEDURE — 36415 COLL VENOUS BLD VENIPUNCTURE: CPT

## 2024-03-26 PROCEDURE — 80048 BASIC METABOLIC PNL TOTAL CA: CPT

## 2024-03-26 PROCEDURE — 83735 ASSAY OF MAGNESIUM: CPT

## 2024-03-26 PROCEDURE — 2580000003 HC RX 258: Performed by: STUDENT IN AN ORGANIZED HEALTH CARE EDUCATION/TRAINING PROGRAM

## 2024-03-26 PROCEDURE — 2580000003 HC RX 258: Performed by: INTERNAL MEDICINE

## 2024-03-26 PROCEDURE — 3609017100 HC EGD: Performed by: STUDENT IN AN ORGANIZED HEALTH CARE EDUCATION/TRAINING PROGRAM

## 2024-03-26 PROCEDURE — 43235 EGD DIAGNOSTIC BRUSH WASH: CPT | Performed by: STUDENT IN AN ORGANIZED HEALTH CARE EDUCATION/TRAINING PROGRAM

## 2024-03-26 PROCEDURE — 99232 SBSQ HOSP IP/OBS MODERATE 35: CPT | Performed by: SURGERY

## 2024-03-26 PROCEDURE — 2709999900 HC NON-CHARGEABLE SUPPLY: Performed by: STUDENT IN AN ORGANIZED HEALTH CARE EDUCATION/TRAINING PROGRAM

## 2024-03-26 PROCEDURE — 6370000000 HC RX 637 (ALT 250 FOR IP): Performed by: INTERNAL MEDICINE

## 2024-03-26 PROCEDURE — 2580000003 HC RX 258: Performed by: NURSE ANESTHETIST, CERTIFIED REGISTERED

## 2024-03-26 PROCEDURE — 6370000000 HC RX 637 (ALT 250 FOR IP): Performed by: STUDENT IN AN ORGANIZED HEALTH CARE EDUCATION/TRAINING PROGRAM

## 2024-03-26 PROCEDURE — 0DJ08ZZ INSPECTION OF UPPER INTESTINAL TRACT, VIA NATURAL OR ARTIFICIAL OPENING ENDOSCOPIC: ICD-10-PCS | Performed by: STUDENT IN AN ORGANIZED HEALTH CARE EDUCATION/TRAINING PROGRAM

## 2024-03-26 PROCEDURE — 7100000011 HC PHASE II RECOVERY - ADDTL 15 MIN: Performed by: STUDENT IN AN ORGANIZED HEALTH CARE EDUCATION/TRAINING PROGRAM

## 2024-03-26 PROCEDURE — 82962 GLUCOSE BLOOD TEST: CPT

## 2024-03-26 PROCEDURE — 6360000002 HC RX W HCPCS: Performed by: STUDENT IN AN ORGANIZED HEALTH CARE EDUCATION/TRAINING PROGRAM

## 2024-03-26 RX ORDER — POLYETHYLENE GLYCOL 3350 17 G/17G
17 POWDER, FOR SOLUTION ORAL ONCE
Status: COMPLETED | OUTPATIENT
Start: 2024-03-26 | End: 2024-03-26

## 2024-03-26 RX ORDER — SODIUM CHLORIDE, SODIUM LACTATE, POTASSIUM CHLORIDE, CALCIUM CHLORIDE 600; 310; 30; 20 MG/100ML; MG/100ML; MG/100ML; MG/100ML
INJECTION, SOLUTION INTRAVENOUS CONTINUOUS PRN
Status: DISCONTINUED | OUTPATIENT
Start: 2024-03-26 | End: 2024-03-26 | Stop reason: SDUPTHER

## 2024-03-26 RX ORDER — SODIUM CHLORIDE, SODIUM LACTATE, POTASSIUM CHLORIDE, CALCIUM CHLORIDE 600; 310; 30; 20 MG/100ML; MG/100ML; MG/100ML; MG/100ML
INJECTION, SOLUTION INTRAVENOUS CONTINUOUS
Status: DISCONTINUED | OUTPATIENT
Start: 2024-03-26 | End: 2024-03-26 | Stop reason: HOSPADM

## 2024-03-26 RX ORDER — PROPOFOL 10 MG/ML
INJECTION, EMULSION INTRAVENOUS PRN
Status: DISCONTINUED | OUTPATIENT
Start: 2024-03-26 | End: 2024-03-26 | Stop reason: SDUPTHER

## 2024-03-26 RX ADMIN — DOCUSATE SODIUM 50 MG AND SENNOSIDES 8.6 MG 2 TABLET: 8.6; 5 TABLET, FILM COATED ORAL at 08:29

## 2024-03-26 RX ADMIN — CEFTRIAXONE SODIUM 2000 MG: 2 INJECTION, POWDER, FOR SOLUTION INTRAMUSCULAR; INTRAVENOUS at 08:36

## 2024-03-26 RX ADMIN — PROPOFOL 50 MG: 10 INJECTION, EMULSION INTRAVENOUS at 14:53

## 2024-03-26 RX ADMIN — PROPOFOL 50 MG: 10 INJECTION, EMULSION INTRAVENOUS at 14:52

## 2024-03-26 RX ADMIN — PANTOPRAZOLE SODIUM 40 MG: 40 TABLET, DELAYED RELEASE ORAL at 17:00

## 2024-03-26 RX ADMIN — SODIUM CHLORIDE, SODIUM LACTATE, POTASSIUM CHLORIDE, AND CALCIUM CHLORIDE: 600; 310; 30; 20 INJECTION, SOLUTION INTRAVENOUS at 14:15

## 2024-03-26 RX ADMIN — SODIUM CHLORIDE, POTASSIUM CHLORIDE, SODIUM LACTATE AND CALCIUM CHLORIDE: 600; 310; 30; 20 INJECTION, SOLUTION INTRAVENOUS at 14:44

## 2024-03-26 RX ADMIN — Medication 4.5 MG: at 20:54

## 2024-03-26 RX ADMIN — PANTOPRAZOLE SODIUM 40 MG: 40 TABLET, DELAYED RELEASE ORAL at 06:20

## 2024-03-26 RX ADMIN — POLYETHYLENE GLYCOL 3350 17 G: 17 POWDER, FOR SOLUTION ORAL at 21:21

## 2024-03-26 RX ADMIN — PROPOFOL 50 MG: 10 INJECTION, EMULSION INTRAVENOUS at 14:56

## 2024-03-26 RX ADMIN — SODIUM CHLORIDE, PRESERVATIVE FREE 10 ML: 5 INJECTION INTRAVENOUS at 09:26

## 2024-03-26 RX ADMIN — VENLAFAXINE HYDROCHLORIDE 150 MG: 150 CAPSULE, EXTENDED RELEASE ORAL at 08:29

## 2024-03-26 RX ADMIN — BUPRENORPHINE HYDROCHLORIDE AND NALOXONE HYDROCHLORIDE DIHYDRATE 1 TABLET: 8; 2 TABLET SUBLINGUAL at 08:29

## 2024-03-26 RX ADMIN — OCTREOTIDE ACETATE 25 MCG/HR: 500 INJECTION, SOLUTION INTRAVENOUS; SUBCUTANEOUS at 08:41

## 2024-03-26 RX ADMIN — SODIUM CHLORIDE, PRESERVATIVE FREE 10 ML: 5 INJECTION INTRAVENOUS at 21:27

## 2024-03-26 ASSESSMENT — PAIN - FUNCTIONAL ASSESSMENT: PAIN_FUNCTIONAL_ASSESSMENT: 0-10

## 2024-03-26 ASSESSMENT — LIFESTYLE VARIABLES: SMOKING_STATUS: 1

## 2024-03-26 NOTE — OP NOTE
Endoscopy Note          Operative Report    Patient: Papito Morillo Jr MRN: [id number]      YOB: 1975  Age: 48 y.o.  Sex: male            Indications:  Hematemesis    Preoperative Evaluation: The patient was evaluated prior to the procedure in the GI lab admission area, the patient ASA was recorded .  Consent was obtained from the patient with the risk of perforation bleeding and aspiration.    Anesthesia: KIRILL-per anesthesia  Complications: None  EBL: Unremarkable  Procedure: The patient was sedated in the left lateral decubitus position. Scope was advance from the mouth to the third portion of the duodenum. The scope was withdrawn to the stomach and a refroflexed view was performed.     Findings:  Trace esophageal varices.  Mild portal hypertensive gastropathy  Large bulging mass/tissue defect into the greater curvature of the stomach, no obvious fistulous track but the surface of this mass was irritated, unable to evaluate the whole surface whether there is obvious fistulous tract between the noted hematoma from MRI into the stomach.  Normal duodenum.    Postoperative Diagnosis:  Trace varices  Portal hypertensive gastropathy  Extragastric mass/hematoma    Recommendations:   If he has bleeding again recommend CTA for possible pancreatic pseudoaneurysm related bleeding.   GI will sign off at this time.  We will have him follow up as an outpatient.   Recommend avoiding EtOH.     Signed By:  Princess Mejia MD     March 26, 2024

## 2024-03-26 NOTE — ANESTHESIA POSTPROCEDURE EVALUATION
Department of Anesthesiology  Postprocedure Note    Patient: Papito Morillo Jr  MRN: [id number]  YOB: 1975  Date of evaluation: 3/26/2024    Procedure Summary       Date: 03/26/24 Room / Location: [facility] ENDO 04 / [facility] ENDOSCOPY    Anesthesia Start: 1446 Anesthesia Stop: 1502    Procedure: ESOPHAGOGASTRODUODENOSCOPY (Upper GI Region) Diagnosis:       Esophageal varices in cirrhosis (HCC)      (Esophageal varices in cirrhosis (HCC) [K74.60, I85.10])    Surgeons: Princess Mejia MD Responsible Provider: Arden Pepe Jr., MD    Anesthesia Type: TIVA ASA Status: 3 - Emergent            Anesthesia Type: No value filed.    Catrachito Phase I: Catrachito Score: 10    Catrachito Phase II: Catrachito Score: 10    Anesthesia Post Evaluation    Patient location during evaluation: PACU  Patient participation: complete - patient participated  Level of consciousness: awake  Airway patency: patent  Nausea & Vomiting: no nausea and no vomiting  Cardiovascular status: blood pressure returned to baseline  Respiratory status: acceptable  Hydration status: euvolemic  Pain management: adequate    No notable events documented.

## 2024-03-26 NOTE — PROGRESS NOTES
TRANSFER - IN REPORT:    Verbal report received from YOVANI Chun on Papito Morillo Jr  being received from 801 for ordered procedure      Report consisted of patient's Situation, Background, Assessment and   Recommendations(SBAR).     Information from the following report(s) Nurse Handoff Report, Adult Overview, Surgery Report, Intake/Output, MAR, and Recent Results was reviewed with the receiving nurse.    Opportunity for questions and clarification was provided.      Assessment completed upon patient's arrival to unit and care assumed.

## 2024-03-26 NOTE — PROGRESS NOTES
Patient sitting in bed on room air. Family at bedside. Alert and oriented. Denies pain, nausea, and SOB at this time and indicates no needs. Octreotide infusing at 5mL/ hour. Patient instructed to call for assistance. Call light is within reach.

## 2024-03-26 NOTE — PROGRESS NOTES
Pt resting in bed. Respirations even and unlabored on room air. Mother at the bedside. No needs expressed. Call light in reach and pt encouraged to use if needed.

## 2024-03-26 NOTE — PROGRESS NOTES
TRANSFER - OUT REPORT:    Verbal report given to YOVANI Chun on Papito Morillo Jr  being transferred to [facility] for routine post-op       Report consisted of patient’s Situation, Background, Assessment and Recommendations(SBAR).     Information from the following report(s) Procedure Summary was reviewed with the receiving nurse.    Opportunity for questions and clarification was provided.

## 2024-03-26 NOTE — PROGRESS NOTES
H&P/Consult Note/Progress Note/Office Note:   Papito Morillo Jr  MRN: [id number]  :1975  Age:48 y.o.    HPI: Papito Morillo Jr is a 48 y.o. male with h/o daily, heavy ETOH use and pancreatitis   who came to the ER on 3/22/24 c/o 4 day h/o progressive abd pain with nausea but no vomiting.  Little relief with Gas-x and Pepcid.  Drinks 12 beers/day  Has h/o pancreatitis and thrombocytopenia (plt 88k on 3/20/22)      In ER he was AF  WBC 14.7k, Hgb 10.8, Plt 119  BUN/Cr 28/2.7  T bili 1.4  AST/ALT 28/39  Alk phosp 86  Alb 3.5  Lipase 148    CT without contrast and US as below  He was admitted by Hospitalists   Gen Surgery was consulted      3/22/24 CT abd/pelvis without contrast       The unenhanced liver, gallbladder, adrenals and kidneys are unremarkable.  Hypodense area is noted along the medial aspect of the spleen, it is unclear if  this represents subcapsular fluid or parenchymal abnormality of the spleen, and  measures about 9.1 cm in AP dimension and 4.3 cm in transverse dimension.  Pancreatic calcifications noted in keeping with sequela of chronic pancreatitis.  There is no CT evidence of acute pancreatitis. Urinary bladder only contains a  small amount of fluid and is not adequately distended. There is moderate ascites  within the abdomen and pelvis. There is a left upper quadrant mass in the  gastrosplenic region creating mass effect on the greater curvature of the  stomach and extending inferiorly adjacent to the distal body and tail the  pancreas with this mass measuring about 7 cm in AP dimension and 7 cm in  transverse dimension showing Hounsfield units of 56, most suggestive of a  hematoma. There is no free intraperitoneal air or fluid. Recanalized umbilical  vein and upper abdominal portosystemic collaterals suggested in keeping with  portal venous hypertension. There are vascular calcifications along the  abdominal aorta and iliac arteries without aneurysmal dilatation.

## 2024-03-26 NOTE — PROGRESS NOTES
Hospitalist Progress Note   Admit Date:  3/22/2024 11:03 AM   Name:  Papito Morillo Jr   Age:  48 y.o.  Sex:  male  :  1975   MRN:  [id number]   Room:  [facility]/    Presenting/Chief Complaint: No chief complaint on file.     Reason(s) for Admission: ETOH abuse [F10.10]  Other ascites [R18.8]  LEANDRA (acute kidney injury) (HCC) [N17.9]  Left upper quadrant abdominal mass [R19.02]     Hospital Course:     Papito Morillo Jr is a 48 y.o. male with medical history of ETOH use daily 8-10 nightly, chronic pain on suboxone, tobacco use, who is evaluated with lower abdominal pain 5-6/10  No nausea or emesis  Eating  No trauma  Has been told has prior liver issues  No kristina cirrhosis  Not taking anticoagulation  Has darker urine  No fever  No dyspnea            No prior diabetes      - corrected for hyperglycemia at 129     Creatinine 2.70     CTAP shows in determinant area hypodensity along medial spleen, LLQ mass possibly hematoma along gastrosplenic region with mass effect on stomach to pancreas , moderate ascites         US ABD confirms ascites         HGB 10.8  Total bilirubin 1.4  Platelets 119  WBC elevated         Meets sepsis criteria due to leukocytosis, lactic acidosis.  He was treated with broad-spectrum antibiotics.  Afebrile, no leukocytosis, procalcitonin normal.  Chest x-ray normal.  Blood cultures negative to date.  Discontinued antibiotics.  Repeat CT abdomen pelvis showed hemoperitoneum, cirrhosis and gastric varices.  Hb is 7.1 and s/p  1 unit of PRBC.  GI consulted and started on Protonix..His creatinine improved. IR consulted for paracentesis. Iron, FA and B12 was normal.  MRI of the abdomen showed a pseudoaneurysm. IR was consulted for possible angiography.       Subjective & 24hr Events:   Patient was seen and examined at the bedside. Family was at the bedside.      Assessment & Plan:   ABLA  Possible gastric varices   Hemoglobin is stable around 8.4  Continue

## 2024-03-26 NOTE — DIABETES MGMT
Patient admitted with LEANDRA. Blood glucose ranged 109-201 yesterday with patient receiving Humalog 1 unit. Blood glucose this morning was 102 on lab work. Most recent poc glucose 122. Creatinine 0.70. GFR >60. Reviewed patient current regimen: Humalog correctional insulin. Patient currently NPO. Patient seen for follow up diabetes education, patient parents at bedside.    Patient given educational material, \"Diabetes Self-Management: A Patient Teaching Guide\". Explained basic physiology of diabetes, as well as causes, signs and symptoms, and treatments for hypoglycemia and hyperglycemia. Described the effects of poor glycemic control and the development of long-term complications such as renal, eye, nerve, and cardiovascular disease. Patient states lately he has been drinking coffee with milk and sugar, eating oreos and drinking milk for breakfast, drinks sweet tea with lunch and dinner. Reviewed effects of sweetened beverages on glycemic control and discussed alternative beverages to help improve glycemic control.     Explained the importance of blood glucose monitoring to patient and how this will provide their primary care provider with more information to help them safely titrate their regimen. Recommended to record in log book to take to primary care provider appointment. Demonstrated how to use a blood glucose meter, care of strips, and sharps disposal. Educated patient that all glucometers are similar but differ in small ways. Educated patient that they should try to get the glucometer covered by their insurance formulary to keep their costs down. Educated patient to seek out affordable glucometer options that exist at some stores or drug stores if they are ever uninsured. Reviewed target blood glucose goals per American Diabetes Association recommendations. Patient was able to return demonstrate correct use of meter. Patient will need prescription for glucometer kit, test strips, and lancets at discharge so

## 2024-03-26 NOTE — PROGRESS NOTES
Patient arrival back to floor from GI lab. On room air, denies pain, no distress noted. Call light within reach.

## 2024-03-26 NOTE — PROGRESS NOTES
Patient lying in bed on room air. Alert and oriented, denies pain, nausea, SOB and indicates no needs at this time. Instructed to call for assisance. Call light is within reach, care ongoing.

## 2024-03-26 NOTE — ANESTHESIA PRE PROCEDURE
Department of Anesthesiology  Preprocedure Note       Name:  Papito Morillo Jr   Age:  48 y.o.  :  1975                                          MRN:  [id number]         Date:  3/26/2024      Surgeon: Surgeon(s):  Princess Mejia MD    Procedure: Procedure(s):  ESOPHAGOGASTRODUODENOSCOPY    Medications prior to admission:   Prior to Admission medications    Medication Sig Start Date End Date Taking? Authorizing Provider   buprenorphine-naloxone (SUBOXONE) 8-2 MG FILM SL film Place 1 Film under the tongue 2 times daily.   Yes Automatic Reconciliation, Ar   venlafaxine (EFFEXOR XR) 150 MG extended release capsule Take 1 capsule by mouth daily   Yes Automatic Reconciliation, Ar       Current medications:    Current Facility-Administered Medications   Medication Dose Route Frequency Provider Last Rate Last Admin    octreotide (SANDOSTATIN) 500 mcg in sodium chloride 0.9 % 100 mL infusion  25 mcg/hr IntraVENous Continuous Di Smart MD 5 mL/hr at 24 0841 25 mcg/hr at 24 0841    cefTRIAXone (ROCEPHIN) 2,000 mg in sodium chloride 0.9 % 50 mL IVPB (mini-bag)  2,000 mg IntraVENous Q24H Di Smart MD   Stopped at 24 0907    pantoprazole (PROTONIX) tablet 40 mg  40 mg Oral BID AC Di Smart MD   40 mg at 24 0620    buprenorphine-naloxone (SUBOXONE) 8-2 MG SL tablet 1 tablet  1 tablet SubLINGual BID Di Smart MD   1 tablet at 24 0829    melatonin tablet 4.5 mg  4.5 mg Oral Nightly Di Smart MD   4.5 mg at 24 2119    diatrizoate meglumine-sodium (GASTROGRAFIN) 66-10 % solution 15 mL  15 mL Oral ONCE PRN Di Smart MD   15 mL at 24 0925    sennosides-docusate sodium (SENOKOT-S) 8.6-50 MG tablet 2 tablet  2 tablet Oral Daily Di Smart MD   2 tablet at 24 0829    venlafaxine (EFFEXOR XR) extended release capsule 150 mg  150 mg Oral Daily Adwoa John MD   150 mg at 24 0829    sodium chloride flush 0.9 % injection 5-40 mL

## 2024-03-26 NOTE — PROGRESS NOTES
TRANSFER - OUT REPORT:    Verbal report given to YOVANI Rose (name) on Papito Morillo Jr  being transferred to Endoscopy Lab(unit) for ordered procedure       Report consisted of patient’s Situation, Background, Assessment and   Recommendations(SBAR).     Information from the following report(s) Nurse Handoff Report was reviewed with the receiving nurse.    Opportunity for questions and clarification was provided.      Patient transported with:   Registered Nurse    Lines: 20g LAC 20g R forearm 22g L hand Help Text      This SmartLink retrieves the last documented value for LDA assessment data, retrieved by either LDA type or by LDA group ID.     This SmartLink should be used in a SmartText or SmartPhrase. If one is not available, please contact your .

## 2024-03-26 NOTE — PROGRESS NOTES
TRANSFER - IN REPORT:    Verbal report received from YOVANI Tirado (name) on Papito Morillo   being received from GI Lab(unit) for routine post-op      Report consisted of patient’s Situation, Background, Assessment and   Recommendations(SBAR).     Information from the following report(s) Nurse Handoff Report was reviewed with the receiving nurse.    Opportunity for questions and clarification was provided.      Assessment completed upon patient’s arrival to unit and care assume

## 2024-03-27 ENCOUNTER — HOSPITAL ENCOUNTER (OUTPATIENT)
Dept: INTERVENTIONAL RADIOLOGY/VASCULAR | Age: 49
Discharge: HOME | DRG: 871 | End: 2024-03-30
Attending: FAMILY MEDICINE
Payer: COMMERCIAL

## 2024-03-27 ENCOUNTER — APPOINTMENT (OUTPATIENT)
Dept: CT IMAGING | Age: 49
DRG: 871 | End: 2024-03-27
Payer: COMMERCIAL

## 2024-03-27 VITALS
HEIGHT: 73 IN | HEART RATE: 65 BPM | TEMPERATURE: 98.2 F | SYSTOLIC BLOOD PRESSURE: 143 MMHG | WEIGHT: 225 LBS | BODY MASS INDEX: 29.82 KG/M2 | RESPIRATION RATE: 16 BRPM | DIASTOLIC BLOOD PRESSURE: 93 MMHG | OXYGEN SATURATION: 93 %

## 2024-03-27 LAB
ANION GAP SERPL CALC-SCNC: 3 MMOL/L (ref 2–11)
BACTERIA SPEC CULT: NORMAL
BACTERIA SPEC CULT: NORMAL
BASOPHILS # BLD: 0 K/UL (ref 0–0.2)
BASOPHILS NFR BLD: 1 % (ref 0–2)
BUN SERPL-MCNC: 7 MG/DL (ref 6–23)
CALCIUM SERPL-MCNC: 9.4 MG/DL (ref 8.3–10.4)
CHLORIDE SERPL-SCNC: 101 MMOL/L (ref 103–113)
CO2 SERPL-SCNC: 32 MMOL/L (ref 21–32)
CREAT SERPL-MCNC: 0.7 MG/DL (ref 0.8–1.5)
DIFFERENTIAL METHOD BLD: ABNORMAL
EOSINOPHIL # BLD: 0.2 K/UL (ref 0–0.8)
EOSINOPHIL NFR BLD: 3 % (ref 0.5–7.8)
ERYTHROCYTE [DISTWIDTH] IN BLOOD BY AUTOMATED COUNT: 12.2 % (ref 11.9–14.6)
GLUCOSE BLD STRIP.AUTO-MCNC: 109 MG/DL (ref 65–100)
GLUCOSE BLD STRIP.AUTO-MCNC: 127 MG/DL (ref 65–100)
GLUCOSE BLD STRIP.AUTO-MCNC: 132 MG/DL (ref 65–100)
GLUCOSE BLD STRIP.AUTO-MCNC: 229 MG/DL (ref 65–100)
GLUCOSE SERPL-MCNC: 100 MG/DL (ref 65–100)
HCT VFR BLD AUTO: 27.5 % (ref 41.1–50.3)
HGB BLD-MCNC: 9 G/DL (ref 13.6–17.2)
IMM GRANULOCYTES # BLD AUTO: 0 K/UL (ref 0–0.5)
IMM GRANULOCYTES NFR BLD AUTO: 0 % (ref 0–5)
LYMPHOCYTES # BLD: 1.5 K/UL (ref 0.5–4.6)
LYMPHOCYTES NFR BLD: 30 % (ref 13–44)
MAGNESIUM SERPL-MCNC: 2.1 MG/DL (ref 1.8–2.4)
MCH RBC QN AUTO: 31.8 PG (ref 26.1–32.9)
MCHC RBC AUTO-ENTMCNC: 32.7 G/DL (ref 31.4–35)
MCV RBC AUTO: 97.2 FL (ref 82–102)
MONOCYTES # BLD: 0.9 K/UL (ref 0.1–1.3)
MONOCYTES NFR BLD: 18 % (ref 4–12)
NEUTS SEG # BLD: 2.4 K/UL (ref 1.7–8.2)
NEUTS SEG NFR BLD: 48 % (ref 43–78)
NRBC # BLD: 0 K/UL (ref 0–0.2)
PHOSPHATE SERPL-MCNC: 4.6 MG/DL (ref 2.5–4.5)
PLATELET # BLD AUTO: 110 K/UL (ref 150–450)
PMV BLD AUTO: 9.7 FL (ref 9.4–12.3)
POTASSIUM SERPL-SCNC: 4 MMOL/L (ref 3.5–5.1)
RBC # BLD AUTO: 2.83 M/UL (ref 4.23–5.6)
SERVICE CMNT-IMP: ABNORMAL
SERVICE CMNT-IMP: NORMAL
SERVICE CMNT-IMP: NORMAL
SODIUM SERPL-SCNC: 136 MMOL/L (ref 136–146)
WBC # BLD AUTO: 4.9 K/UL (ref 4.3–11.1)

## 2024-03-27 PROCEDURE — 99232 SBSQ HOSP IP/OBS MODERATE 35: CPT | Performed by: SURGERY

## 2024-03-27 PROCEDURE — 76882 US LMTD JT/FCL EVL NVASC XTR: CPT | Performed by: RADIOLOGY

## 2024-03-27 PROCEDURE — 80048 BASIC METABOLIC PNL TOTAL CA: CPT

## 2024-03-27 PROCEDURE — 1100000000 HC RM PRIVATE

## 2024-03-27 PROCEDURE — 2580000003 HC RX 258: Performed by: INTERNAL MEDICINE

## 2024-03-27 PROCEDURE — 6360000002 HC RX W HCPCS: Performed by: STUDENT IN AN ORGANIZED HEALTH CARE EDUCATION/TRAINING PROGRAM

## 2024-03-27 PROCEDURE — 2580000003 HC RX 258: Performed by: STUDENT IN AN ORGANIZED HEALTH CARE EDUCATION/TRAINING PROGRAM

## 2024-03-27 PROCEDURE — 6360000004 HC RX CONTRAST MEDICATION: Performed by: NURSE ANESTHETIST, CERTIFIED REGISTERED

## 2024-03-27 PROCEDURE — 74174 CTA ABD&PLVS W/CONTRAST: CPT | Performed by: RADIOLOGY

## 2024-03-27 PROCEDURE — 6370000000 HC RX 637 (ALT 250 FOR IP): Performed by: STUDENT IN AN ORGANIZED HEALTH CARE EDUCATION/TRAINING PROGRAM

## 2024-03-27 PROCEDURE — 83735 ASSAY OF MAGNESIUM: CPT

## 2024-03-27 PROCEDURE — 85025 COMPLETE CBC W/AUTO DIFF WBC: CPT

## 2024-03-27 PROCEDURE — 82962 GLUCOSE BLOOD TEST: CPT

## 2024-03-27 PROCEDURE — 84100 ASSAY OF PHOSPHORUS: CPT

## 2024-03-27 PROCEDURE — 74174 CTA ABD&PLVS W/CONTRAST: CPT

## 2024-03-27 PROCEDURE — 6360000002 HC RX W HCPCS: Performed by: RADIOLOGY

## 2024-03-27 PROCEDURE — 36415 COLL VENOUS BLD VENIPUNCTURE: CPT

## 2024-03-27 PROCEDURE — 76937 US GUIDE VASCULAR ACCESS: CPT

## 2024-03-27 PROCEDURE — 99152 MOD SED SAME PHYS/QHP 5/>YRS: CPT

## 2024-03-27 PROCEDURE — 6370000000 HC RX 637 (ALT 250 FOR IP): Performed by: INTERNAL MEDICINE

## 2024-03-27 RX ORDER — FENTANYL CITRATE 50 UG/ML
INJECTION, SOLUTION INTRAMUSCULAR; INTRAVENOUS PRN
Status: COMPLETED | OUTPATIENT
Start: 2024-03-27 | End: 2024-03-27

## 2024-03-27 RX ORDER — MIDAZOLAM HYDROCHLORIDE 2 MG/2ML
INJECTION, SOLUTION INTRAMUSCULAR; INTRAVENOUS PRN
Status: COMPLETED | OUTPATIENT
Start: 2024-03-27 | End: 2024-03-27

## 2024-03-27 RX ADMIN — VENLAFAXINE HYDROCHLORIDE 150 MG: 150 CAPSULE, EXTENDED RELEASE ORAL at 07:55

## 2024-03-27 RX ADMIN — FENTANYL CITRATE 50 MCG: 50 INJECTION, SOLUTION INTRAMUSCULAR; INTRAVENOUS at 15:25

## 2024-03-27 RX ADMIN — MIDAZOLAM HYDROCHLORIDE 1 MG: 1 INJECTION, SOLUTION INTRAMUSCULAR; INTRAVENOUS at 15:20

## 2024-03-27 RX ADMIN — SODIUM CHLORIDE, PRESERVATIVE FREE 10 ML: 5 INJECTION INTRAVENOUS at 07:56

## 2024-03-27 RX ADMIN — IOPAMIDOL 100 ML: 755 INJECTION, SOLUTION INTRAVENOUS at 16:05

## 2024-03-27 RX ADMIN — CEFTRIAXONE SODIUM 2000 MG: 2 INJECTION, POWDER, FOR SOLUTION INTRAMUSCULAR; INTRAVENOUS at 09:56

## 2024-03-27 RX ADMIN — Medication 4.5 MG: at 21:54

## 2024-03-27 RX ADMIN — BUPRENORPHINE HYDROCHLORIDE AND NALOXONE HYDROCHLORIDE DIHYDRATE 1 TABLET: 8; 2 TABLET SUBLINGUAL at 07:55

## 2024-03-27 RX ADMIN — SODIUM CHLORIDE, PRESERVATIVE FREE 10 ML: 5 INJECTION INTRAVENOUS at 21:54

## 2024-03-27 RX ADMIN — OCTREOTIDE ACETATE 25 MCG/HR: 500 INJECTION, SOLUTION INTRAVENOUS; SUBCUTANEOUS at 04:00

## 2024-03-27 RX ADMIN — PANTOPRAZOLE SODIUM 40 MG: 40 TABLET, DELAYED RELEASE ORAL at 05:42

## 2024-03-27 RX ADMIN — DOCUSATE SODIUM 50 MG AND SENNOSIDES 8.6 MG 2 TABLET: 8.6; 5 TABLET, FILM COATED ORAL at 07:55

## 2024-03-27 RX ADMIN — MIDAZOLAM HYDROCHLORIDE 1 MG: 1 INJECTION, SOLUTION INTRAMUSCULAR; INTRAVENOUS at 15:25

## 2024-03-27 RX ADMIN — FENTANYL CITRATE 50 MCG: 50 INJECTION, SOLUTION INTRAMUSCULAR; INTRAVENOUS at 15:20

## 2024-03-27 ASSESSMENT — PAIN SCALES - GENERAL
PAINLEVEL_OUTOF10: 0

## 2024-03-27 ASSESSMENT — PAIN - FUNCTIONAL ASSESSMENT: PAIN_FUNCTIONAL_ASSESSMENT: NONE - DENIES PAIN

## 2024-03-27 NOTE — PROGRESS NOTES
H&P/Consult Note/Progress Note/Office Note:   Papito Morillo Jr  MRN: [id number]  :1975  Age:48 y.o.    HPI: Papito Morillo Jr is a 48 y.o. male with h/o daily, heavy ETOH use and pancreatitis   who came to the ER on 3/22/24 c/o 4 day h/o progressive abd pain with nausea but no vomiting.  Little relief with Gas-x and Pepcid.  Drinks 12 beers/day  Has h/o pancreatitis and thrombocytopenia (plt 88k on 3/20/22)      In ER he was AF  WBC 14.7k, Hgb 10.8, Plt 119  BUN/Cr 28/2.7  T bili 1.4  AST/ALT 28/39  Alk phosp 86  Alb 3.5  Lipase 148    CT without contrast and US as below  He was admitted by Hospitalists   Gen Surgery was consulted      3/22/24 CT abd/pelvis without contrast       The unenhanced liver, gallbladder, adrenals and kidneys are unremarkable.  Hypodense area is noted along the medial aspect of the spleen, it is unclear if  this represents subcapsular fluid or parenchymal abnormality of the spleen, and  measures about 9.1 cm in AP dimension and 4.3 cm in transverse dimension.  Pancreatic calcifications noted in keeping with sequela of chronic pancreatitis.  There is no CT evidence of acute pancreatitis. Urinary bladder only contains a  small amount of fluid and is not adequately distended. There is moderate ascites  within the abdomen and pelvis. There is a left upper quadrant mass in the  gastrosplenic region creating mass effect on the greater curvature of the  stomach and extending inferiorly adjacent to the distal body and tail the  pancreas with this mass measuring about 7 cm in AP dimension and 7 cm in  transverse dimension showing Hounsfield units of 56, most suggestive of a  hematoma. There is no free intraperitoneal air or fluid. Recanalized umbilical  vein and upper abdominal portosystemic collaterals suggested in keeping with  portal venous hypertension. There are vascular calcifications along the  abdominal aorta and iliac arteries without aneurysmal dilatation.    ?Decision regarding minor surgery with identified patient or procedure risk factors  ?Decision regarding elective major surgery without identified patient or procedure risk factors   ?Diagnosis or treatment significantly limited by social determinants of health       56485  32836 High High  ?1or more chronic illnesses with severe exacerbation, progression, or side effects of treatment;    or  ?1 acute or chronic illness or injury that poses a threat to life or bodily function - abd mass with cirrhosis, portal HTN and acute renal failure   Extensive  (Must meet the requirements of at least 2 out of 3 categories)  Category 1: Tests, documents, or independent historian(s)  ?Any combination of 3 from the following:   ?Review of prior external note(s) from each unique source*;  ?Review of the result(s) of each unique test*;   ?Ordering of each unique test*;   ?Assessment requiring an independent historian(s)    or   Category 2: Independent interpretation of tests   ?Independent interpretation of a test performed by another physician/other qualified health care professional (not separately reported);     or  Category 3: Discussion of management or test interpretation  ?Discussion of management or test interpretation with external physician/other qualified health care professional/appropriate source (not separately reported)   High risk of morbidity from additional diagnostic testing or treatment  Examples only:  ?Drug therapy requiring intensive monitoring for toxicity  ?Decision regarding elective major surgery with identified patient or procedure risk factors  ?Decision regarding emergency major surgery - decided against emergent surgery  ?Decision regarding hospitalization  ?Decision not to resuscitate or to de-escalate care because of poor prognosis      Parenteral controlled substances             I have personally performed a face-to-face diagnostic evaluation and management  service on this patient.      I have

## 2024-03-27 NOTE — FLOWSHEET NOTE
03/27/24 0745   Handoff   Communication Given Shift Handoff   Handoff Received From Beverley Bey RN   Handoff Communication Face to Face   Time Handoff Given 0700

## 2024-03-27 NOTE — OR NURSING
Patient taken to CT for stat CT imagine per MD Frost. Patient transported to CT and back with this RN without incident.

## 2024-03-27 NOTE — DIABETES MGMT
Patient admitted with LEANDRA. Blood glucose ranged 103-189 yesterday with patient receiving no diabetes medications. Blood glucose this morning was 132. Creatinine 0.70. GFR >90. Reviewed patient current regimen: Humalog correctional insulin.    Patient seen for follow up diabetes education. Patient father at bedside. \"Diabetes Self-Management: A Patient Teaching Guide\", reviewed with patient. Explained basic physiology of diabetes, as well as causes, signs and symptoms, and treatments for hypoglycemia and hyperglycemia. Described the effects of poor glycemic control and the development of long-term complications such as renal, eye, nerve, and cardiovascular disease. Described proper diabetic foot care and the importance of checking feet daily. Reviewed effects of sweetened beverages on glycemic control and discussed alternative beverages to help improve glycemic control. Patient voices that they do not plan to drink alcoholic beverages anymore. Per patient they typically eat lunch and dinner at home. Educated re: effects of carbohydrates on blood glucose, the \"plate method\" of healthy meal planning, basics of healthy meal plan, Consistent Carbohydrate Diet. Encouraged portion control. Discussed complex versus simple carbs, family plans to switch to whole wheat bread. Educated patient regarding the benefits of physical activity (as cleared by provider) on glycemic control. Also explained the relationship between hyperglycemia and infection and delayed healing. Discussed target goals for blood glucose and A1C. Educated patient regarding diabetic medications including mechanism of action, timing, and possible side effects. Patient verbalizes understanding of teaching.    Encouraged compliance with discharge regimen. Encouraged patient to continue to work on lifestyle modifications and to follow up with primary care provider ([facility] referral per patient) for further titration of regimen. Patient and family verbalized

## 2024-03-27 NOTE — OR NURSING
TRANSFER - OUT REPORT:           Verbal report given to YOVANI Ceron(name) on Papito Morillo Jr  being transferred to IR Recovery 4(unit) for  routine post-op            Report consisted of patient’s Situation, Background, Assessment and      Recommendations(SBAR).          Information from the following report(s) SBAR, Procedure Summary, and MAR was reviewed with the receiving nurse.       Opportunity for questions and clarification was provided.          Conscious Sedation:    100 Mcg of Fentanyl administered   2 Mg of Versed administered     Pt tolerated procedure well.      Peripheral Intravenous Line:   Peripheral IV 03/22/24 Distal;Left Cephalic (Active)   Site Assessment Clean, dry & intact 03/27/24 0745   Line Status Capped;Flushed 03/27/24 0745   Line Care Cap changed 03/27/24 0745   Phlebitis Assessment No symptoms 03/27/24 0745   Infiltration Assessment 0 03/27/24 0745   Alcohol Cap Used Yes 03/27/24 0745   Dressing Status Clean, dry & intact 03/27/24 0745   Dressing Type Transparent 03/27/24 0745       Peripheral IV 03/27/24 Posterior;Right Hand (Active)   Site Assessment Clean, dry & intact 03/27/24 0958   Line Status Flushed 03/27/24 0958   Line Care Connections checked and tightened 03/27/24 0958   Phlebitis Assessment No symptoms 03/27/24 0958   Infiltration Assessment 0 03/27/24 0958   Alcohol Cap Used Yes 03/27/24 0958   Dressing Status Clean, dry & intact 03/27/24 0958   Dressing Type Transparent 03/27/24 0958   Dressing Intervention New 03/27/24 0958       VITALS:  BP (!) 161/89   Pulse 80   Temp 98.2 °F (36.8 °C) (Infrared)   Resp 16   Ht 1.854 m (6' 1\")   Wt 102.1 kg (225 lb)   SpO2 95%   BMI 29.69 kg/m²

## 2024-03-27 NOTE — PROGRESS NOTES
Pt in bed resting rr are even and unlabored lung sounds are clear no distress noted. Abd soft bowel sounds are active pt abd is distended no guarding or tenderness noted. Pt denies pain this AM. Skin intact no new issues noted. Safety measures in place will continue to monitor.

## 2024-03-27 NOTE — PROGRESS NOTES
Hospitalist Progress Note   Admit Date:  3/22/2024 11:03 AM   Name:  Papito Morillo Jr   Age:  48 y.o.  Sex:  male  :  1975   MRN:  [id number]   Room:  [facility]/    Presenting/Chief Complaint: No chief complaint on file.     Reason(s) for Admission: ETOH abuse [F10.10]  Other ascites [R18.8]  LEANDRA (acute kidney injury) (HCC) [N17.9]  Left upper quadrant abdominal mass [R19.02]     Hospital Course:     Papito Morillo Jr is a 48 y.o. male with medical history of ETOH use daily 8-10 nightly, chronic pain on suboxone, tobacco use, who is evaluated with lower abdominal pain 5-6/10  No nausea or emesis  Eating  No trauma  Has been told has prior liver issues  No kristina cirrhosis  Not taking anticoagulation  Has darker urine  No fever  No dyspnea            No prior diabetes      - corrected for hyperglycemia at 129     Creatinine 2.70     CTAP shows in determinant area hypodensity along medial spleen, LLQ mass possibly hematoma along gastrosplenic region with mass effect on stomach to pancreas , moderate ascites         US ABD confirms ascites         HGB 10.8  Total bilirubin 1.4  Platelets 119  WBC elevated         Meets sepsis criteria due to leukocytosis, lactic acidosis.  He was treated with broad-spectrum antibiotics.  Afebrile, no leukocytosis, procalcitonin normal.  Chest x-ray normal.  Blood cultures negative to date.  Discontinued antibiotics.  Repeat CT abdomen pelvis showed hemoperitoneum, cirrhosis and gastric varices.  Hb is 7.1 and s/p  1 unit of PRBC.  GI consulted and started on Protonix..His creatinine improved. IR consulted for paracentesis. Iron, FA and B12 was normal.  MRI of the abdomen showed a pseudoaneurysm. IR was consulted for possible angiography.  EGD showed gastric varices, portal hypertensive gastropathy.       Subjective & 24hr Events:   Patient was seen and examined at the bedside. Father was at the bedside.  Patient has no new  complaints.      Assessment & Plan:   ABLA  Possible gastric varices   Hemoglobin is stable   Discontinuing Protonix 40 mg IV twice daily  GI signed off, appreciate recommendations    Gastric Mass/hemoperitoneum:   If he rebleeds he needs CTA  Surgery following, appreciate recommendations  IR was consulted for possible angiography    LEANDRA (acute kidney injury) (HCC)  Resolved     Decompensated Ascites:  Discontinue Rocephin and  iv octreotide gtt      Chronic pain:  Continue  suboxone     Acute hyponatremia in the setting of cirrhosis:  Resolved    Tobacco use:  Needs cessation  Added nicotine patch      ETOH use:  Monitor for withdrawals  Add as needed ativan protocol   Thiamine and folic acid daily       Sepsis: Resolved    QTC prolonged:  Tele monitoring      Anemia:     Elevated LFTS: resolved     Thrombocytopenia  Platelet count improving  Monitor CBC daily     New onset diabetes  A1c of 7.2  SSI       Dispo anticipate hospital stay for 24 hours  Communication discussed with the father at the bedside.    PT/OT recommended no further skilled therapy.       PT/OT evals and PPD ordered?  Not ordered; patient not expected to need rehab  Diet: Diet NPO  VTE prophylaxis: SCD's   Code status: Full Code         Non-peripheral Lines and Tubes (if present):          Telemetry (if present):  Cardiac/Telemetry Monitor On: No        Hospital Problems:  Principal Problem:    LEANDRA (acute kidney injury) (HCC)  Active Problems:    Hyponatremia    Chronic pain    Hypertension    Depression    Hyperglycemia    Tobacco use    Alcohol abuse    Alcoholic cirrhosis of liver with ascites (HCC)    Hyperbilirubinemia    Perisplenic hematoma    Intraabdominal mass on CT 3/22/24 (between stomach spleen and pancreas)    Sepsis (HCC)    ETOH abuse    Hemoperitoneum    Gastric varices    Left upper quadrant abdominal mass    Esophageal varices in cirrhosis (HCC)    Other ascites  Resolved Problems:    * No resolved hospital problems.

## 2024-03-27 NOTE — PRE SEDATION
Sedation Pre-Procedure Note    Patient Name: Papito Morillo Jr   YOB: 1975  Room/Bed: Room/bed info not found  Medical Record Number: [id number]  Date: 3/27/2024   Time: 3:02 PM       Indication:  LUQ hematoma    Consent: I have discussed with the patient and/or the patient representative the indication, alternatives, and the possible risks and/or complications of the planned procedure and the anesthesia methods. The patient and/or patient representative appear to understand and agree to proceed.    Vital Signs:   Vitals:    03/27/24 1439   BP: 128/74   Pulse: 66   Resp: 16   Temp: 98.2 °F (36.8 °C)   SpO2: 95%       Past Medical History:   has no past medical history on file.    Past Surgical History:   has a past surgical history that includes Upper gastrointestinal endoscopy (N/A, 3/26/2024).    Medications:   Scheduled Meds:   Continuous Infusions:   PRN Meds:   Home Meds:   Prior to Admission medications    Medication Sig Start Date End Date Taking? Authorizing Provider   buprenorphine-naloxone (SUBOXONE) 8-2 MG FILM SL film Place 1 Film under the tongue 2 times daily.    Automatic Reconciliation, Ar   venlafaxine (EFFEXOR XR) 150 MG extended release capsule Take 1 capsule by mouth daily    Automatic Reconciliation, Ar           Pre-Sedation Documentation and Exam:   Vital signs have been reviewed (see flow sheet for vitals).    Mallampati Airway Assessment:  Mallampati Class II - (soft palate, fauces & uvula are visible)    Prior History of Anesthesia Complications:   none    ASA Classification:  Class 3 - A patient with severe systemic disease that limits activity but is not incapacitating    Sedation/ Anesthesia Plan:   intravenous sedation    Medications Planned:   midazolam (Versed) intravenously and fentanyl intravenously    Patient is an appropriate candidate for plan of sedation: yes    Electronically signed by Edis Frost MD on 3/27/2024 at 3:02 PM

## 2024-03-27 NOTE — CARE COORDINATION
MSN, CM:  Surgery indicated possible discharge tomorrow if no further bleeding.  Patient is on room air and has no discharge needs.  Patient continues to decline any ETOH help or speak with Leo (FAVOR).  Patient has no discharge needs at this time.  Case Management will continue to follow.

## 2024-03-27 NOTE — PROGRESS NOTES
TRANSFER - OUT REPORT:           Verbal report given to YOVANI Antoine(name) on Papito Morillo Jr  being transferred to [facility](unit) for  routine progression of patient care            Report consisted of patient’s Situation, Background, Assessment and      Recommendations(SBAR).          Information from the following report(s) SBAR, Procedure Summary, and MAR was reviewed with the receiving nurse.       Opportunity for questions and clarification was provided.          Conscious Sedation:    100 Mcg of Fentanyl administered   2 Mg of Versed administered     Pt tolerated procedure well.      Peripheral Intravenous Line:   Peripheral IV 03/22/24 Distal;Left Cephalic (Active)   Site Assessment Clean, dry & intact 03/27/24 0745   Line Status Capped;Flushed 03/27/24 0745   Line Care Cap changed 03/27/24 0745   Phlebitis Assessment No symptoms 03/27/24 0745   Infiltration Assessment 0 03/27/24 0745   Alcohol Cap Used Yes 03/27/24 0745   Dressing Status Clean, dry & intact 03/27/24 0745   Dressing Type Transparent 03/27/24 0745       Peripheral IV 03/27/24 Posterior;Right Hand (Active)   Site Assessment Clean, dry & intact 03/27/24 0958   Line Status Flushed 03/27/24 0958   Line Care Connections checked and tightened 03/27/24 0958   Phlebitis Assessment No symptoms 03/27/24 0958   Infiltration Assessment 0 03/27/24 0958   Alcohol Cap Used Yes 03/27/24 0958   Dressing Status Clean, dry & intact 03/27/24 0958   Dressing Type Transparent 03/27/24 0958   Dressing Intervention New 03/27/24 0958       VITALS:  BP (!) 143/93   Pulse 65   Temp 98.2 °F (36.8 °C) (Infrared)   Resp 16   Ht 1.854 m (6' 1\")   Wt 102.1 kg (225 lb)   SpO2 93%   BMI 29.69 kg/m²

## 2024-03-28 ENCOUNTER — TELEPHONE (OUTPATIENT)
Dept: INTERVENTIONAL RADIOLOGY/VASCULAR | Age: 49
End: 2024-03-28

## 2024-03-28 VITALS
OXYGEN SATURATION: 94 % | DIASTOLIC BLOOD PRESSURE: 78 MMHG | SYSTOLIC BLOOD PRESSURE: 123 MMHG | HEIGHT: 73 IN | BODY MASS INDEX: 24.94 KG/M2 | RESPIRATION RATE: 18 BRPM | TEMPERATURE: 97.9 F | HEART RATE: 71 BPM | WEIGHT: 188.2 LBS

## 2024-03-28 LAB
ABO + RH BLD: NORMAL
ANION GAP SERPL CALC-SCNC: 5 MMOL/L (ref 2–11)
BLD PROD TYP BPU: NORMAL
BLOOD BANK DISPENSE STATUS: NORMAL
BLOOD GROUP ANTIBODIES SERPL: NORMAL
BPU ID: NORMAL
BUN SERPL-MCNC: 8 MG/DL (ref 6–23)
CALCIUM SERPL-MCNC: 9.3 MG/DL (ref 8.3–10.4)
CHLORIDE SERPL-SCNC: 100 MMOL/L (ref 103–113)
CO2 SERPL-SCNC: 32 MMOL/L (ref 21–32)
CREAT SERPL-MCNC: 0.7 MG/DL (ref 0.8–1.5)
CROSSMATCH RESULT: NORMAL
GLUCOSE BLD STRIP.AUTO-MCNC: 112 MG/DL (ref 65–100)
GLUCOSE BLD STRIP.AUTO-MCNC: 172 MG/DL (ref 65–100)
GLUCOSE SERPL-MCNC: 83 MG/DL (ref 65–100)
POTASSIUM SERPL-SCNC: 3.9 MMOL/L (ref 3.5–5.1)
SERVICE CMNT-IMP: ABNORMAL
SERVICE CMNT-IMP: ABNORMAL
SODIUM SERPL-SCNC: 137 MMOL/L (ref 136–146)
SPECIMEN EXP DATE BLD: NORMAL
UNIT DIVISION: 0

## 2024-03-28 PROCEDURE — 2580000003 HC RX 258: Performed by: INTERNAL MEDICINE

## 2024-03-28 PROCEDURE — 6370000000 HC RX 637 (ALT 250 FOR IP): Performed by: INTERNAL MEDICINE

## 2024-03-28 PROCEDURE — 6370000000 HC RX 637 (ALT 250 FOR IP): Performed by: STUDENT IN AN ORGANIZED HEALTH CARE EDUCATION/TRAINING PROGRAM

## 2024-03-28 PROCEDURE — 82962 GLUCOSE BLOOD TEST: CPT

## 2024-03-28 PROCEDURE — 80048 BASIC METABOLIC PNL TOTAL CA: CPT

## 2024-03-28 PROCEDURE — 36415 COLL VENOUS BLD VENIPUNCTURE: CPT

## 2024-03-28 PROCEDURE — 6360000002 HC RX W HCPCS: Performed by: STUDENT IN AN ORGANIZED HEALTH CARE EDUCATION/TRAINING PROGRAM

## 2024-03-28 PROCEDURE — 99232 SBSQ HOSP IP/OBS MODERATE 35: CPT | Performed by: SURGERY

## 2024-03-28 RX ADMIN — BUPRENORPHINE HYDROCHLORIDE AND NALOXONE HYDROCHLORIDE DIHYDRATE 1 TABLET: 8; 2 TABLET SUBLINGUAL at 09:12

## 2024-03-28 RX ADMIN — DOCUSATE SODIUM 50 MG AND SENNOSIDES 8.6 MG 2 TABLET: 8.6; 5 TABLET, FILM COATED ORAL at 09:12

## 2024-03-28 RX ADMIN — METFORMIN HYDROCHLORIDE 500 MG: 500 TABLET ORAL at 09:39

## 2024-03-28 RX ADMIN — VENLAFAXINE HYDROCHLORIDE 150 MG: 150 CAPSULE, EXTENDED RELEASE ORAL at 09:12

## 2024-03-28 RX ADMIN — SODIUM CHLORIDE, PRESERVATIVE FREE 10 ML: 5 INJECTION INTRAVENOUS at 10:00

## 2024-03-28 NOTE — PROGRESS NOTES
Patient lying in bed on room air. Alert and oriented x4. Family at bedside. Patient denies any pain, nausea, or SOB and indicates no needs at this time. Patient instructed to call for assistance. Call light is within reach. Care ongoing.

## 2024-03-28 NOTE — DISCHARGE SUMMARY
Hospitalist Discharge Summary   Admit Date:  3/22/2024 11:03 AM   DC Note date: 3/28/2024  Name:  Papito Morillo Jr   Age:  48 y.o.  Sex:  male  :  1975   MRN:  [id number]   Room:  [facility]  PCP:  None, None    Presenting Complaint: No chief complaint on file.     Initial Admission Diagnosis: ETOH abuse [F10.10]  Other ascites [R18.8]  LEANDRA (acute kidney injury) (HCC) [N17.9]  Left upper quadrant abdominal mass [R19.02]     Problem List for this Hospitalization (present on admission):    Principal Problem:    LEANDRA (acute kidney injury) (HCC)  Active Problems:    Hyponatremia    Chronic pain    Hypertension    Depression    Hyperglycemia    Tobacco use    Alcohol abuse    Alcoholic cirrhosis of liver with ascites (HCC)    Hyperbilirubinemia    Perisplenic hematoma    Intraabdominal mass on CT 3/22/24 (between stomach spleen and pancreas)    Sepsis (HCC)    ETOH abuse    Hemoperitoneum    Gastric varices    Left upper quadrant abdominal mass    Esophageal varices in cirrhosis (HCC)    Other ascites  Resolved Problems:    * No resolved hospital problems. *      Hospital Course:  Papito Morillo Jr is a 48 y.o. male with medical history of ETOH use daily 8-10 nightly, chronic pain on suboxone, tobacco use, who is evaluated with lower abdominal pain.    Labs significant for HGB 10.8, Total bilirubin 1.4, Platelets 119, WBC elevated , - corrected for hyperglycemia at 129, Creatinine 2.70   CTAP shows in determinant area hypodensity along medial spleen, LLQ mass possibly hematoma along gastrosplenic region with mass effect on stomach to pancreas , moderate ascites. US ABD confirms ascites. Meets sepsis criteria due to leukocytosis, lactic acidosis.  He was treated with broad-spectrum antibiotics.  Afebrile, no leukocytosis, procalcitonin normal.  Chest x-ray normal.  Blood cultures negative to date. He was treated with IV rocephin and Octreotide gtt.  Repeat CT abdomen pelvis with contrast  rupture or an arterial pseudoaneurysm with rupture and correlation with angiography would be of benefit. There is no evidence for tumor. If there are any questions about this report, I can be reached on [employer].     CT ABDOMEN PELVIS W IV CONTRAST Additional Contrast? Oral    Addendum Date: 3/24/2024    Addendum: There is a masslike structure at the gastric fundus/proximal gastric body measuring 7.6 cm in craniocaudal dimension by 7.2 cm in transverse dimension by 7.4 cm in AP dimension. This is centered on the gastric wall and is isodense with the stomach. Compared to yesterday's CT there is no internal enhancement. I suspect this is a mural based hematoma possibly from a variceal bleed, which would also fit with the noted hemoperitoneum. Less likely considerations would be a hemorrhagic gastrointestinal stromal tumor. Hemorrhagic pseudocyst is possible but highly unlikely. Continued follow-up is recommended.     Result Date: 3/24/2024  1. Mild/moderate hemoperitoneum without significant change from yesterday. 2. No active bleeding identified. 3. Cirrhosis with extensive varices in the upper abdomen including prominent gastric varices. 4. No splenic laceration identified. Changes of chronic pancreatitis without definite mass. If there are any questions about this report, I can be reached on [employer] or at [phone]     XR CHEST PORTABLE    Result Date: 3/22/2024  No acute findings in the chest     US ABDOMEN LIMITED Specify organ? GALLBLADDER    Result Date: 3/22/2024  Enlarged liver with heterogeneity and increased echogenicity. There is perihepatic and abdominal ascites.     CT ABDOMEN PELVIS WO CONTRAST Additional Contrast? None    Result Date: 3/22/2024  1. Indeterminate hypodense area along the medial aspect of the spleen, it is unclear if this represents subcapsular fluid or splenic parenchymal abnormality. If clinically feasible recommend CT abdomen/pelvis with IV contrast for further evaluation. 2.

## 2024-03-28 NOTE — CARE COORDINATION
MSN, CM:  patient to be discharged home today with no services ordered or requested.  Patient declines any ETOH help or information from this CM or Leo (FAVOR).  Patient and family agree with this discharge plan.  Patient has met all milestones for this admission.  Family to transport patient home.       03/28/24 1019   Service Assessment   Patient Orientation Alert and Oriented   Cognition Alert   Services At/After Discharge   Services At/After Discharge None   Mode of Transport at Discharge Other (see comment)  (parents)   Confirm Follow Up Transport Family   Condition of Participation: Discharge Planning   The Patient and/or Patient Representative was provided with a Choice of Provider? Patient;Patient Representative   Name of the Patient Representative who was provided with the Choice of Provider and agrees with the Discharge Plan?  parents   The Patient and/Or Patient Representative agree with the Discharge Plan? Yes

## 2024-03-28 NOTE — ACP (ADVANCE CARE PLANNING)
Advance Care Planning     Advance Care Planning Inpatient Note  [facility] Department    Today's Date: 3/28/2024  Unit: [facility] 8 MED SURG    Received request from IDT Member.  Upon review of chart and communication with care team, patient's decision making abilities are not in question.. Patient and Parents  was/were present in the room during visit.    Goals of ACP Conversation:  Discuss advance care planning documents    Health Care Decision Makers:       Primary Decision Maker: Kaitlyn Morillo - Parent - [phone]    Secondary Decision Maker: Papito Morillo SR - Parent - [phone]    Supplemental (Other) Decision Maker: Korin Wade - Brother/Sister - [phone]  Summary:  Completed New Documents  Updated Healthcare Decision Maker    Advance Care Planning Documents (Patient Wishes):  Healthcare Power of /Advance Directive Appointment of Health Care Agent     Assessment:  Patient was recently hospitalized and expressed desire to complete HCPOA.    Interventions:  Provided education on documents for clarity and greater understanding  Discussed and provided education on state decision maker hierarchy  Assisted in the completion of documents according to patient's wishes at this time  Encouraged ongoing ACP conversation with future decision makers and loved ones    Care Preferences Communicated:   No    Outcomes/Plan:  ACP Discussion: Completed  New advance directive completed.  Returned original document(s) to patient, as well as copies for distribution to appointed agents  Copy of advance directive given to staff to scan into medical record.  Routed ACP note to attending provider or other IDT member.  Teach Back Method used to verify the patient's and/or Healthcare Decision Maker's understanding of key information in the advance directive documents    Electronically signed by Chaplain Jeff on 3/28/2024 at 9:43 AM

## 2024-03-28 NOTE — PROGRESS NOTES
H&P/Consult Note/Progress Note/Office Note:   Papito Morillo Jr  MRN: [id number]  :1975  Age:48 y.o.    HPI: Papito Morillo Jr is a 48 y.o. male with h/o daily, heavy ETOH use and pancreatitis   who came to the ER on 3/22/24 c/o 4 day h/o progressive abd pain with nausea but no vomiting.  Little relief with Gas-x and Pepcid.  Drinks 12 beers/day  Has h/o pancreatitis and thrombocytopenia (plt 88k on 3/20/22)      In ER he was AF  WBC 14.7k, Hgb 10.8, Plt 119  BUN/Cr 28/2.7  T bili 1.4  AST/ALT 28/39  Alk phosp 86  Alb 3.5  Lipase 148    CT without contrast and US as below  He was admitted by Hospitalists   Gen Surgery was consulted      3/22/24 CT abd/pelvis without contrast       The unenhanced liver, gallbladder, adrenals and kidneys are unremarkable.  Hypodense area is noted along the medial aspect of the spleen, it is unclear if  this represents subcapsular fluid or parenchymal abnormality of the spleen, and  measures about 9.1 cm in AP dimension and 4.3 cm in transverse dimension.  Pancreatic calcifications noted in keeping with sequela of chronic pancreatitis.  There is no CT evidence of acute pancreatitis. Urinary bladder only contains a  small amount of fluid and is not adequately distended. There is moderate ascites  within the abdomen and pelvis. There is a left upper quadrant mass in the  gastrosplenic region creating mass effect on the greater curvature of the  stomach and extending inferiorly adjacent to the distal body and tail the  pancreas with this mass measuring about 7 cm in AP dimension and 7 cm in  transverse dimension showing Hounsfield units of 56, most suggestive of a  hematoma. There is no free intraperitoneal air or fluid. Recanalized umbilical  vein and upper abdominal portosystemic collaterals suggested in keeping with  portal venous hypertension. There are vascular calcifications along the  abdominal aorta and iliac arteries without aneurysmal dilatation.  Normal.  - KIDNEYS/URETERS: Normal kidneys and ureters.  - BLADDER: Normal.  - REPRODUCTIVE ORGANS: Normal prostate size.     - BOWEL: No bowel obstruction or colitis. No GI tract tumor identified.  - LYMPH NODES: No significant retroperitoneal, mesenteric, or pelvic adenopathy.  - BONES: No fracture or bone lesion. Mild degenerative changes at the spine. No suspicious bone lesions.  - VASCULATURE: Moderate calcified plaque of the abdominal aorta without aneurysm.   Upper abdominal varices including gastric varices. 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  - OTHER: Mild/moderate volume free fluid throughout the abdomen/pelvis with  Hounsfield units 40-50 consistent with blood/hemoperitoneum.     IMPRESSION:  1. Mild/moderate hemoperitoneum without significant change from yesterday.  2. No active bleeding identified.  3. Cirrhosis with extensive varices in the upper abdomen including prominent gastric varices.  4. No splenic laceration identified. Changes of chronic pancreatitis without definite mass.     Addendum:   There is a masslike structure at the gastric fundus/proximal gastric body measuring 7.6 cm in craniocaudal dimension by 7.2 cm in transverse dimension by  7.4 cm in AP dimension. This is centered on the gastric wall and is isodense with the stomach. Compared to yesterday's CT there is no internal enhancement. I  suspect this is a mural based hematoma possibly from a variceal bleed, which would also fit with the noted hemoperitoneum. Less likely considerations would  be a hemorrhagic gastrointestinal stromal tumor. Hemorrhagic pseudocyst is possible but highly unlikely. Continued follow-up is recommended.       It will need further workup  Possible EUS with GI in coming weeks  Follow-up CT imaging to look for interval changes as outpt  If this is hematoma, it should resolve in time unless he rebleeds      3/25/24 MRI abd  MRI OF THE ABDOMEN     INDICATION: Hematoma versus tumor     Standard MRI

## 2024-03-28 NOTE — DIABETES MGMT
Patient admitted with LEANDRA. Blood glucose ranged 100-229 yesterday with patient receiving no diabetes medications. Blood glucose this morning was 112. Creatinine 0.70. GFR > 90. Reviewed patient current regimen: Humalog correctional insulin.  Patient would likely benefit from oral diabetes medication at discharge.  Provider updated via [employer] regarding recommendations and patient glycemic control.  New order received for Metformin 500 mg BID.    Patient will need prescription for glucometer kit, test strips, and lancets at discharge so that the patient may obtain the meter covered by their insurance.

## 2024-03-28 NOTE — PROGRESS NOTES
This RN provided patient with written discharge instructions. Discussed in detail with patient and family their new medications, medication schedule, follow up appointments, diabetic education, and smoking/alcohol cessation. Patient and family were given opportunity to have any qyestions answered. Patient verbalized understanding. Paper prescription sent with patient. Transported via wheelchair by staff to [facility].

## 2024-03-28 NOTE — DISCHARGE INSTRUCTIONS
Activity  No Alcohol - This is critical to your survival  Avoid NSAIDs (Ibuprofen, Motrin, Advil, Aleve, etc)  Avoid aspirin and all blood thinners      Follow-up  Follow-up with Dr Perez in approx 2 weeks in the office at:  [facility]  [address] , [address]  [phone];  press option 1 for the [facility] office    Follow-up with Gastroenterology in approx 2 weeks or sooner if instructed to do so by them  DISCHARGE SUMMARY from Nurse    PATIENT INSTRUCTIONS:    After general anesthesia or intravenous sedation, for 24 hours or while taking prescription Narcotics:  Limit your activities  Do not drive and operate hazardous machinery  Do not make important personal or business decisions  Do  not drink alcoholic beverages  If you have not urinated within 8 hours after discharge, please contact your surgeon on call.    Report the following to your surgeon:  Excessive pain, swelling, redness or odor of or around the surgical area  Temperature over 100.5  Nausea and vomiting lasting longer than 4 hours or if unable to take medications  Any signs of decreased circulation or nerve impairment to extremity: change in color, persistent  numbness, tingling, coldness or increase pain  Any questions    What to do at Home:  Recommended activity: activity as tolerated.    If you experience any of the following symptoms fever greater that 100.4 unrelieved by medicine, pain unrelieved by rest, new or worsening shortness of breath, signs of bleeding (dark/tarry or bloody stools, blood in vomit or phlegmn, bleeding that does not stop after applying pressure for 5 minutes, please follow up with MD.    *  Please give a list of your current medications to your Primary Care Provider.    *  Please update this list whenever your medications are discontinued, doses are      changed, or new medications (including over-the-counter products) are added.    *  Please carry medication information at all times in  case of emergency situations.    These are general instructions for a healthy lifestyle:    No smoking/ No tobacco products/ Avoid exposure to second hand smoke  Surgeon General's Warning:  Quitting smoking now greatly reduces serious risk to your health.    Obesity, smoking, and sedentary lifestyle greatly increases your risk for illness    A healthy diet, regular physical exercise & weight monitoring are important for maintaining a healthy lifestyle    You may be retaining fluid if you have a history of heart failure or if you experience any of the following symptoms:  Weight gain of 3 pounds or more overnight or 5 pounds in a week, increased swelling in our hands or feet or shortness of breath while lying flat in bed.  Please call your doctor as soon as you notice any of these symptoms; do not wait until your next office visit.        The discharge information has been reviewed with the {PATIENT PARENT GUARDIAN:17352}.  The {PATIENT PARENT GUARDIAN:93729} verbalized understanding.  Discharge medications reviewed with the {[facility] meds reviewed with:13102} and appropriate educational materials and side effects teaching were provided.  ___________________________________________________________________________________________________________________________________

## 2024-03-28 NOTE — PROGRESS NOTES
Advance Care Planning     Advance Care Planning Inpatient Note  [facility] Department    Today's Date: 3/28/2024  Unit: [facility] 8 MED SURG    Received request from IDT Member.  Upon review of chart and communication with care team, patient's decision making abilities are not in question.. Patient and Parents was/were present in the room during visit.    Goals of ACP Conversation:  Discuss advance care planning documents    Health Care Decision Makers:       Primary Decision Maker: Kaitlyn Morillo - Parent - [phone]    Secondary Decision Maker: Papito Morillo SR - Parent - [phone]    Supplemental (Other) Decision Maker: Korin Wade - Brother/Sister - [phone]  Summary:  Completed New Documents  Updated Healthcare Decision Maker    Advance Care Planning Documents (Patient Wishes):  Healthcare Power of /Advance Directive Appointment of Health Care Agent     Assessment:  Patient was recently hospitalized and expressed desire to complete HCPOA.    Interventions:  Provided education on documents for clarity and greater understanding  Discussed and provided education on state decision maker hierarchy  Assisted in the completion of documents according to patient's wishes at this time  Encouraged ongoing ACP conversation with future decision makers and loved ones    Care Preferences Communicated:   No    Outcomes/Plan:  ACP Discussion: Completed  New advance directive completed.  Returned original document(s) to patient, as well as copies for distribution to appointed agents  Copy of advance directive given to staff to scan into medical record.  Routed ACP note to attending provider or other IDT member.  Teach Back Method used to verify the patient's and/or Healthcare Decision Maker's understanding of key information in the advance directive documents    Electronically signed by Chaplain Jeff on 3/28/2024 at 9:43 AM

## 2024-03-28 NOTE — TELEPHONE ENCOUNTER
I tried calling the patient to let him know that we will follow-up with him in our office in 1 month to talk to him about whether or not he might need a TIPS procedure in the future.  His voicemail box was full.  We will try him again to ensure he has outpatient follow-up after his hospital discharge.

## 2024-04-17 ENCOUNTER — OFFICE VISIT (OUTPATIENT)
Dept: SURGERY | Age: 49
End: 2024-04-17
Payer: COMMERCIAL

## 2024-04-17 VITALS
DIASTOLIC BLOOD PRESSURE: 82 MMHG | HEIGHT: 73 IN | OXYGEN SATURATION: 98 % | WEIGHT: 177 LBS | SYSTOLIC BLOOD PRESSURE: 142 MMHG | HEART RATE: 65 BPM | BODY MASS INDEX: 23.46 KG/M2

## 2024-04-17 DIAGNOSIS — R19.00 INTRAABDOMINAL MASS: ICD-10-CM

## 2024-04-17 DIAGNOSIS — R19.02 LEFT UPPER QUADRANT ABDOMINAL MASS: ICD-10-CM

## 2024-04-17 DIAGNOSIS — F10.10 ALCOHOL ABUSE: ICD-10-CM

## 2024-04-17 DIAGNOSIS — K66.1 HEMOPERITONEUM: ICD-10-CM

## 2024-04-17 DIAGNOSIS — F10.10 ETOH ABUSE: ICD-10-CM

## 2024-04-17 DIAGNOSIS — K70.31 ALCOHOLIC CIRRHOSIS OF LIVER WITH ASCITES (HCC): ICD-10-CM

## 2024-04-17 DIAGNOSIS — K70.31 ALCOHOLIC CIRRHOSIS OF LIVER WITH ASCITES (HCC): Primary | ICD-10-CM

## 2024-04-17 DIAGNOSIS — D73.5 PERISPLENIC HEMATOMA: ICD-10-CM

## 2024-04-17 LAB
ALBUMIN SERPL-MCNC: 3.6 G/DL (ref 3.5–5)
ALBUMIN/GLOB SERPL: 0.9 (ref 0.4–1.6)
ALP SERPL-CCNC: 108 U/L (ref 50–136)
ALT SERPL-CCNC: 49 U/L (ref 12–65)
ANION GAP SERPL CALC-SCNC: 2 MMOL/L (ref 2–11)
AST SERPL-CCNC: 49 U/L (ref 15–37)
BASOPHILS # BLD: 0.1 K/UL (ref 0–0.2)
BASOPHILS NFR BLD: 2 % (ref 0–2)
BILIRUB SERPL-MCNC: 0.4 MG/DL (ref 0.2–1.1)
BUN SERPL-MCNC: 9 MG/DL (ref 6–23)
CALCIUM SERPL-MCNC: 9.9 MG/DL (ref 8.3–10.4)
CHLORIDE SERPL-SCNC: 102 MMOL/L (ref 103–113)
CO2 SERPL-SCNC: 33 MMOL/L (ref 21–32)
CREAT SERPL-MCNC: 0.9 MG/DL (ref 0.8–1.5)
DIFFERENTIAL METHOD BLD: ABNORMAL
EOSINOPHIL # BLD: 0.2 K/UL (ref 0–0.8)
EOSINOPHIL NFR BLD: 4 % (ref 0.5–7.8)
ERYTHROCYTE [DISTWIDTH] IN BLOOD BY AUTOMATED COUNT: 12 % (ref 11.9–14.6)
GLOBULIN SER CALC-MCNC: 4.1 G/DL (ref 2.8–4.5)
GLUCOSE SERPL-MCNC: 132 MG/DL (ref 65–100)
HCT VFR BLD AUTO: 45 % (ref 41.1–50.3)
HGB BLD-MCNC: 14.1 G/DL (ref 13.6–17.2)
IMM GRANULOCYTES # BLD AUTO: 0 K/UL (ref 0–0.5)
IMM GRANULOCYTES NFR BLD AUTO: 0 % (ref 0–5)
LYMPHOCYTES # BLD: 1.9 K/UL (ref 0.5–4.6)
LYMPHOCYTES NFR BLD: 35 % (ref 13–44)
MCH RBC QN AUTO: 30.7 PG (ref 26.1–32.9)
MCHC RBC AUTO-ENTMCNC: 31.3 G/DL (ref 31.4–35)
MCV RBC AUTO: 97.8 FL (ref 82–102)
MONOCYTES # BLD: 0.5 K/UL (ref 0.1–1.3)
MONOCYTES NFR BLD: 9 % (ref 4–12)
NEUTS SEG # BLD: 2.7 K/UL (ref 1.7–8.2)
NEUTS SEG NFR BLD: 51 % (ref 43–78)
NRBC # BLD: 0 K/UL (ref 0–0.2)
PLATELET # BLD AUTO: 112 K/UL (ref 150–450)
PMV BLD AUTO: 11.2 FL (ref 9.4–12.3)
POTASSIUM SERPL-SCNC: 4.5 MMOL/L (ref 3.5–5.1)
PROT SERPL-MCNC: 7.7 G/DL (ref 6.3–8.2)
RBC # BLD AUTO: 4.6 M/UL (ref 4.23–5.6)
SODIUM SERPL-SCNC: 137 MMOL/L (ref 136–146)
WBC # BLD AUTO: 5.3 K/UL (ref 4.3–11.1)

## 2024-04-17 PROCEDURE — 3077F SYST BP >= 140 MM HG: CPT | Performed by: SURGERY

## 2024-04-17 PROCEDURE — 99214 OFFICE O/P EST MOD 30 MIN: CPT | Performed by: SURGERY

## 2024-04-17 PROCEDURE — 3079F DIAST BP 80-89 MM HG: CPT | Performed by: SURGERY

## 2024-04-17 NOTE — PROGRESS NOTES
gastroesophageal varices    US Result (most recent):  US ABDOMEN LIMITED 03/22/2024    Narrative  RIGHT UPPER QUADRANT  ULTRASOUND    INDICATION: Epigastric pain.    COMPARISON: 1/14/2020.    Transabdominal imaging of the upper abdomen was performed.    FINDINGS:  -LIVER: 19.9 cm. Increased echogenicity and heterogeneity. Perihepatic ascites  is present.  -BILE DUCTS: No intrahepatic bile duct dilatation.  CBD diameter = 5.4 mm.  -GALLBLADDER: Normal size and appearance.  No stones.  -PANCREAS: Poorly visualized.    -RIGHT KIDNEY: 11.5 cm.  No mass or hydronephrosis.  -ABDOMINAL AORTA AND IVC: Normal in size where visualized.  -ASCITES: Mild generalized ascites.    Impression  Enlarged liver with heterogeneity and increased echogenicity. There  is perihepatic and abdominal ascites.      Admission date (for inpatients): (Not on file)   * No surgery found *  * No surgery found *        ASSESSMENT/PLAN:  [unfilled]  Active Problems:    * No active hospital problems. *  Resolved Problems:    * No resolved hospital problems. *     Patient Active Problem List    Diagnosis Date Noted    Hemoperitoneum 03/25/2024    Gastric varices 03/25/2024    Left upper quadrant abdominal mass 03/25/2024    Other ascites 03/25/2024    ETOH abuse 03/24/2024    Alcoholic cirrhosis of liver with ascites (HCC) 03/22/2024    Hyperbilirubinemia 03/22/2024    Perisplenic hematoma 03/22/2024    Intraabdominal mass on CT 3/22/24 (between stomach spleen and pancreas) 03/22/2024    Sepsis (HCC) 03/22/2024    Esophageal varices in cirrhosis (HCC) 03/22/2024    Pancreatitis 01/14/2020    Hyponatremia 01/14/2020    Chronic pain 01/14/2020    Hypertension 01/14/2020    Depression 01/14/2020    Hyperglycemia 01/14/2020    LEANDRA (acute kidney injury) (HCC) 01/14/2020    Tobacco use 01/14/2020    Alcohol abuse 01/14/2020          Number and Complexity of Problems addressed and   Risks of complications and/or morbidity of management          Mass between

## 2024-04-25 ENCOUNTER — OFFICE VISIT (OUTPATIENT)
Dept: GASTROENTEROLOGY | Age: 49
End: 2024-04-25
Payer: COMMERCIAL

## 2024-04-25 VITALS
BODY MASS INDEX: 28.23 KG/M2 | HEIGHT: 73 IN | WEIGHT: 213 LBS | DIASTOLIC BLOOD PRESSURE: 84 MMHG | SYSTOLIC BLOOD PRESSURE: 148 MMHG | OXYGEN SATURATION: 95 % | HEART RATE: 65 BPM | RESPIRATION RATE: 16 BRPM

## 2024-04-25 DIAGNOSIS — K70.31 ALCOHOLIC CIRRHOSIS OF LIVER WITH ASCITES (HCC): Primary | ICD-10-CM

## 2024-04-25 PROCEDURE — 99214 OFFICE O/P EST MOD 30 MIN: CPT | Performed by: STUDENT IN AN ORGANIZED HEALTH CARE EDUCATION/TRAINING PROGRAM

## 2024-04-25 PROCEDURE — 3077F SYST BP >= 140 MM HG: CPT | Performed by: STUDENT IN AN ORGANIZED HEALTH CARE EDUCATION/TRAINING PROGRAM

## 2024-04-25 PROCEDURE — 3079F DIAST BP 80-89 MM HG: CPT | Performed by: STUDENT IN AN ORGANIZED HEALTH CARE EDUCATION/TRAINING PROGRAM

## 2024-04-30 ENCOUNTER — HOSPITAL ENCOUNTER (OUTPATIENT)
Dept: CT IMAGING | Age: 49
Discharge: HOME | End: 2024-05-03
Attending: SURGERY
Payer: COMMERCIAL

## 2024-04-30 DIAGNOSIS — D73.5 PERISPLENIC HEMATOMA: ICD-10-CM

## 2024-04-30 DIAGNOSIS — K70.31 ALCOHOLIC CIRRHOSIS OF LIVER WITH ASCITES (HCC): ICD-10-CM

## 2024-04-30 PROCEDURE — 6360000004 HC RX CONTRAST MEDICATION: Performed by: SURGERY

## 2024-04-30 PROCEDURE — 74177 CT ABD & PELVIS W/CONTRAST: CPT

## 2024-04-30 PROCEDURE — 74177 CT ABD & PELVIS W/CONTRAST: CPT | Performed by: RADIOLOGY

## 2024-04-30 RX ADMIN — IOPAMIDOL 100 ML: 755 INJECTION, SOLUTION INTRAVENOUS at 08:52

## 2024-04-30 RX ADMIN — DIATRIZOATE MEGLUMINE AND DIATRIZOATE SODIUM 15 ML: 660; 100 LIQUID ORAL; RECTAL at 08:52

## 2024-05-01 ENCOUNTER — OFFICE VISIT (OUTPATIENT)
Dept: SURGERY | Age: 49
End: 2024-05-01
Payer: COMMERCIAL

## 2024-05-01 VITALS
HEIGHT: 73 IN | SYSTOLIC BLOOD PRESSURE: 112 MMHG | WEIGHT: 214 LBS | OXYGEN SATURATION: 86 % | DIASTOLIC BLOOD PRESSURE: 72 MMHG | BODY MASS INDEX: 28.36 KG/M2 | HEART RATE: 62 BPM

## 2024-05-01 DIAGNOSIS — K70.31 ALCOHOLIC CIRRHOSIS OF LIVER WITH ASCITES (HCC): ICD-10-CM

## 2024-05-01 DIAGNOSIS — R19.00 INTRAABDOMINAL MASS: ICD-10-CM

## 2024-05-01 DIAGNOSIS — K76.6 PORTAL HYPERTENSION (HCC): ICD-10-CM

## 2024-05-01 DIAGNOSIS — D73.5 PERISPLENIC HEMATOMA: ICD-10-CM

## 2024-05-01 DIAGNOSIS — Z72.0 TOBACCO USE: ICD-10-CM

## 2024-05-01 DIAGNOSIS — K70.30 ALCOHOLIC CIRRHOSIS, UNSPECIFIED WHETHER ASCITES PRESENT (HCC): ICD-10-CM

## 2024-05-01 DIAGNOSIS — K66.1 PERITONEAL HEMATOMA: ICD-10-CM

## 2024-05-01 DIAGNOSIS — S30.1XXA ABDOMINAL HEMATOMA: Primary | ICD-10-CM

## 2024-05-01 PROCEDURE — 3074F SYST BP LT 130 MM HG: CPT | Performed by: SURGERY

## 2024-05-01 PROCEDURE — 99214 OFFICE O/P EST MOD 30 MIN: CPT | Performed by: SURGERY

## 2024-05-01 PROCEDURE — 3078F DIAST BP <80 MM HG: CPT | Performed by: SURGERY

## 2024-05-01 NOTE — PROGRESS NOTES
evidence of hemorrhage.   Resolving intramural hematoma of the stomach without evidence of enhancement. No pseudoaneurysm. No enhancing mass seen.   Adrenal glands retroperitoneal spaces are normal. Iliac veins and inferior vena cava are normal.   Bilateral common and external iliac arteries are widely patent.   Common femoral profundofemoral arteries are diseased without stenosis or occlusion.  Vascular/Other:  Normal     Decreased amount of intraperitoneal fluid in the upper abdomen. Send stable pelvic free fluid     IMPRESSION:  Resolving gastric hematoma. No underlying arterial pseudoaneurysm.     Trace left pleural effusion without loculation. Patent mesenteric and splenic  arteries. Chronic occlusion of the splenic vein with well-developed chronic  short gastric collaterals seen     Portal hypertension with gastroesophageal varices         4/30/24 CT abd/pelvis with IV contrast      Hx: Follow up from previous CT for possible hematoma, variceal bleedHx of cirrhosis, portal htn   COMPARISON: March 27, 2024     - LUNG BASES: No infiltrates or masses.     - LIVER: Normal in size and appearance.    - GALLBLADDER/BILE DUCTS: No gallstones or bile duct dilatation.  - PANCREAS: Chronic calcific pancreatitis. No acute fluid collection.  - SPLEEN: Splenomegaly with splenic vein occlusion and multiple well-developed LUQ   left upper quadrant portosystemic collaterals from the short gastric to the portal system     - ADRENALS: Normal.  - KIDNEYS/URETERS: No hydronephrosis or significant mass.  - BLADDER: Normal.  - REPRODUCTIVE ORGANS: No pelvic masses.     - BOWEL: Normal caliber.  No inflammatory changes. 2.6 cm mass in the greater curvature of the stomach most likely representing residual chronic hematoma.  Significant decrease in size compared with the prior exam  - LYMPH NODES: No significant retroperitoneal, mesenteric, or pelvic adenopathy.  - BONES: No fracture or significant bone lesion.  - VASCULATURE:

## 2024-05-02 ENCOUNTER — TELEPHONE (OUTPATIENT)
Dept: GASTROENTEROLOGY | Age: 49
End: 2024-05-02

## 2024-08-13 SDOH — HEALTH STABILITY: PHYSICAL HEALTH: ON AVERAGE, HOW MANY MINUTES DO YOU ENGAGE IN EXERCISE AT THIS LEVEL?: 10 MIN

## 2024-08-13 SDOH — HEALTH STABILITY: PHYSICAL HEALTH: ON AVERAGE, HOW MANY DAYS PER WEEK DO YOU ENGAGE IN MODERATE TO STRENUOUS EXERCISE (LIKE A BRISK WALK)?: 7 DAYS

## 2024-08-15 ENCOUNTER — OFFICE VISIT (OUTPATIENT)
Dept: FAMILY MEDICINE CLINIC | Facility: CLINIC | Age: 49
End: 2024-08-15
Payer: COMMERCIAL

## 2024-08-15 VITALS
HEART RATE: 60 BPM | DIASTOLIC BLOOD PRESSURE: 80 MMHG | BODY MASS INDEX: 31.89 KG/M2 | SYSTOLIC BLOOD PRESSURE: 148 MMHG | WEIGHT: 227.8 LBS | HEIGHT: 71 IN | OXYGEN SATURATION: 97 %

## 2024-08-15 DIAGNOSIS — E11.9 TYPE 2 DIABETES MELLITUS WITHOUT COMPLICATION, WITHOUT LONG-TERM CURRENT USE OF INSULIN (HCC): Primary | ICD-10-CM

## 2024-08-15 DIAGNOSIS — E78.00 HYPERCHOLESTEREMIA: ICD-10-CM

## 2024-08-15 DIAGNOSIS — E11.9 TYPE 2 DIABETES MELLITUS WITHOUT COMPLICATION, WITHOUT LONG-TERM CURRENT USE OF INSULIN (HCC): ICD-10-CM

## 2024-08-15 DIAGNOSIS — K70.31 ALCOHOLIC CIRRHOSIS OF LIVER WITH ASCITES (HCC): ICD-10-CM

## 2024-08-15 LAB
ALBUMIN SERPL-MCNC: 4 G/DL (ref 3.5–5)
ALBUMIN/GLOB SERPL: 1.2 (ref 1–1.9)
ALP SERPL-CCNC: 94 U/L (ref 40–129)
ALT SERPL-CCNC: 24 U/L (ref 12–65)
ANION GAP SERPL CALC-SCNC: 8 MMOL/L (ref 9–18)
AST SERPL-CCNC: 26 U/L (ref 15–37)
BASOPHILS # BLD: 0.1 K/UL (ref 0–0.2)
BASOPHILS NFR BLD: 1 % (ref 0–2)
BILIRUB SERPL-MCNC: 0.3 MG/DL (ref 0–1.2)
BUN SERPL-MCNC: 5 MG/DL (ref 6–23)
CALCIUM SERPL-MCNC: 9.7 MG/DL (ref 8.8–10.2)
CHLORIDE SERPL-SCNC: 95 MMOL/L (ref 98–107)
CHOLEST SERPL-MCNC: 114 MG/DL (ref 0–200)
CO2 SERPL-SCNC: 31 MMOL/L (ref 20–28)
CREAT SERPL-MCNC: 0.83 MG/DL (ref 0.8–1.3)
CREAT UR-MCNC: 51.5 MG/DL (ref 39–259)
DIFFERENTIAL METHOD BLD: ABNORMAL
EOSINOPHIL # BLD: 0.2 K/UL (ref 0–0.8)
EOSINOPHIL NFR BLD: 3 % (ref 0.5–7.8)
ERYTHROCYTE [DISTWIDTH] IN BLOOD BY AUTOMATED COUNT: 12.4 % (ref 11.9–14.6)
EST. AVERAGE GLUCOSE BLD GHB EST-MCNC: 197 MG/DL
GLOBULIN SER CALC-MCNC: 3.3 G/DL (ref 2.3–3.5)
GLUCOSE SERPL-MCNC: 269 MG/DL (ref 70–99)
HBA1C MFR BLD: 8.5 % (ref 0–5.6)
HCT VFR BLD AUTO: 38.6 % (ref 41.1–50.3)
HDLC SERPL-MCNC: 44 MG/DL (ref 40–60)
HDLC SERPL: 2.6 (ref 0–5)
HGB BLD-MCNC: 12.5 G/DL (ref 13.6–17.2)
IMM GRANULOCYTES # BLD AUTO: 0 K/UL (ref 0–0.5)
IMM GRANULOCYTES NFR BLD AUTO: 0 % (ref 0–5)
LDLC SERPL CALC-MCNC: 47 MG/DL (ref 0–100)
LYMPHOCYTES # BLD: 2 K/UL (ref 0.5–4.6)
LYMPHOCYTES NFR BLD: 38 % (ref 13–44)
MCH RBC QN AUTO: 30.3 PG (ref 26.1–32.9)
MCHC RBC AUTO-ENTMCNC: 32.4 G/DL (ref 31.4–35)
MCV RBC AUTO: 93.7 FL (ref 82–102)
MICROALBUMIN UR-MCNC: <1.2 MG/DL (ref 0–20)
MICROALBUMIN/CREAT UR-RTO: NORMAL MG/G (ref 0–30)
MONOCYTES # BLD: 0.4 K/UL (ref 0.1–1.3)
MONOCYTES NFR BLD: 8 % (ref 4–12)
NEUTS SEG # BLD: 2.6 K/UL (ref 1.7–8.2)
NEUTS SEG NFR BLD: 49 % (ref 43–78)
NRBC # BLD: 0 K/UL (ref 0–0.2)
PLATELET # BLD AUTO: 81 K/UL (ref 150–450)
PMV BLD AUTO: 11.1 FL (ref 9.4–12.3)
POTASSIUM SERPL-SCNC: 5.2 MMOL/L (ref 3.5–5.1)
PROT SERPL-MCNC: 7.2 G/DL (ref 6.3–8.2)
RBC # BLD AUTO: 4.12 M/UL (ref 4.23–5.6)
SODIUM SERPL-SCNC: 135 MMOL/L (ref 136–145)
TRIGL SERPL-MCNC: 112 MG/DL (ref 0–150)
VLDLC SERPL CALC-MCNC: 22 MG/DL (ref 6–23)
WBC # BLD AUTO: 5.2 K/UL (ref 4.3–11.1)

## 2024-08-15 PROCEDURE — 3079F DIAST BP 80-89 MM HG: CPT | Performed by: FAMILY MEDICINE

## 2024-08-15 PROCEDURE — 3051F HG A1C>EQUAL 7.0%<8.0%: CPT | Performed by: FAMILY MEDICINE

## 2024-08-15 PROCEDURE — 99204 OFFICE O/P NEW MOD 45 MIN: CPT | Performed by: FAMILY MEDICINE

## 2024-08-15 PROCEDURE — 3077F SYST BP >= 140 MM HG: CPT | Performed by: FAMILY MEDICINE

## 2024-08-15 SDOH — ECONOMIC STABILITY: FOOD INSECURITY: WITHIN THE PAST 12 MONTHS, THE FOOD YOU BOUGHT JUST DIDN'T LAST AND YOU DIDN'T HAVE MONEY TO GET MORE.: NEVER TRUE

## 2024-08-15 SDOH — ECONOMIC STABILITY: FOOD INSECURITY: WITHIN THE PAST 12 MONTHS, YOU WORRIED THAT YOUR FOOD WOULD RUN OUT BEFORE YOU GOT MONEY TO BUY MORE.: NEVER TRUE

## 2024-08-15 SDOH — ECONOMIC STABILITY: INCOME INSECURITY: HOW HARD IS IT FOR YOU TO PAY FOR THE VERY BASICS LIKE FOOD, HOUSING, MEDICAL CARE, AND HEATING?: NOT HARD AT ALL

## 2024-08-15 ASSESSMENT — PATIENT HEALTH QUESTIONNAIRE - PHQ9
5. POOR APPETITE OR OVEREATING: NOT AT ALL
4. FEELING TIRED OR HAVING LITTLE ENERGY: NOT AT ALL
3. TROUBLE FALLING OR STAYING ASLEEP: NOT AT ALL
SUM OF ALL RESPONSES TO PHQ QUESTIONS 1-9: 0
8. MOVING OR SPEAKING SO SLOWLY THAT OTHER PEOPLE COULD HAVE NOTICED. OR THE OPPOSITE, BEING SO FIGETY OR RESTLESS THAT YOU HAVE BEEN MOVING AROUND A LOT MORE THAN USUAL: NOT AT ALL
7. TROUBLE CONCENTRATING ON THINGS, SUCH AS READING THE NEWSPAPER OR WATCHING TELEVISION: NOT AT ALL
SUM OF ALL RESPONSES TO PHQ QUESTIONS 1-9: 0
1. LITTLE INTEREST OR PLEASURE IN DOING THINGS: NOT AT ALL
2. FEELING DOWN, DEPRESSED OR HOPELESS: NOT AT ALL
SUM OF ALL RESPONSES TO PHQ9 QUESTIONS 1 & 2: 0
SUM OF ALL RESPONSES TO PHQ QUESTIONS 1-9: 0
10. IF YOU CHECKED OFF ANY PROBLEMS, HOW DIFFICULT HAVE THESE PROBLEMS MADE IT FOR YOU TO DO YOUR WORK, TAKE CARE OF THINGS AT HOME, OR GET ALONG WITH OTHER PEOPLE: NOT DIFFICULT AT ALL
SUM OF ALL RESPONSES TO PHQ QUESTIONS 1-9: 0
6. FEELING BAD ABOUT YOURSELF - OR THAT YOU ARE A FAILURE OR HAVE LET YOURSELF OR YOUR FAMILY DOWN: NOT AT ALL
9. THOUGHTS THAT YOU WOULD BE BETTER OFF DEAD, OR OF HURTING YOURSELF: NOT AT ALL

## 2024-08-15 ASSESSMENT — ENCOUNTER SYMPTOMS: RESPIRATORY NEGATIVE: 1

## 2024-08-15 NOTE — PROGRESS NOTES
PROGRESS NOTE    SUBJECTIVE:   Papito Morillo Jr is a 49 y.o. male seen for a follow up visit regarding   Chief Complaint   Patient presents with    New Patient     Establish care        HPI:  New patient, wanting to establish primary care here.  Very pleasant 49-year-old male with a significant history of alcoholism complicated by alcoholic cirrhosis, history of alcoholic pancreatitis.  He has been dry since May.  He sees gastroenterology, latest EGD reportedly shows \"trace\" varices however he has had apparent intra-abdominal bleed, notes suggest this was most likely due to variceal bleed.  Patient brings in his blood sugar readings, they are reflective of poor control presently.         Reviewed and updated this visit by provider:  Tobacco  Allergies  Meds  Problems  Med Hx  Surg Hx  Fam Hx           Review of Systems   Respiratory: Negative.     Cardiovascular: Negative.           OBJECTIVE:  Vitals:    08/15/24 1257   BP: (!) 148/80   Site: Left Upper Arm   Cuff Size: Medium Adult   Pulse: 60   SpO2: 97%   Weight: 103.3 kg (227 lb 12.8 oz)   Height: 1.81 m (5' 11.25\")        Physical Exam   General: Alert and oriented x3, well-appearing  Neck: No adenopathy, thyromegaly or thyroid nodules  Pulmonary: Normal effort, good airflow, no rales or rhonchi  CVS: Regular rate and rhythm, normal S1, S2, no S3 or S4, no murmurs; no carotid bruits, 2+ pedal pulses  Abdomen: Round, normal bowel sounds, nontender and without mass    Medical problems and test results were reviewed with the patient today.     No results found for this or any previous visit (from the past 672 hour(s)).    No results found for any visits on 08/15/24.     ASSESSMENT and PLAN    1. Type 2 diabetes mellitus without complication, without long-term current use of insulin (HCC), poor control based on home blood sugar readings.  Will check his A1c, consider adding SGLT2.  Not a candidate for GLP-1 due to pancreatitis history  -

## 2024-10-08 DIAGNOSIS — D64.9 ANEMIA, UNSPECIFIED TYPE: Primary | ICD-10-CM

## 2024-10-08 DIAGNOSIS — E87.8 ABNORMAL BLOOD ELECTROLYTE LEVEL: ICD-10-CM

## 2024-10-21 ENCOUNTER — HOSPITAL ENCOUNTER (OUTPATIENT)
Dept: CT IMAGING | Age: 49
Discharge: HOME | End: 2024-10-24
Attending: SURGERY
Payer: COMMERCIAL

## 2024-10-21 DIAGNOSIS — R19.00 INTRAABDOMINAL MASS: ICD-10-CM

## 2024-10-21 DIAGNOSIS — D73.5 PERISPLENIC HEMATOMA: ICD-10-CM

## 2024-10-21 DIAGNOSIS — K70.30 ALCOHOLIC CIRRHOSIS, UNSPECIFIED WHETHER ASCITES PRESENT (HCC): ICD-10-CM

## 2024-10-21 DIAGNOSIS — K66.1 PERITONEAL HEMATOMA: ICD-10-CM

## 2024-10-21 DIAGNOSIS — K76.6 PORTAL HYPERTENSION (HCC): ICD-10-CM

## 2024-10-21 DIAGNOSIS — K70.31 ALCOHOLIC CIRRHOSIS OF LIVER WITH ASCITES (HCC): ICD-10-CM

## 2024-10-21 DIAGNOSIS — S30.1XXA ABDOMINAL HEMATOMA: ICD-10-CM

## 2024-10-21 LAB — CREAT BLD-MCNC: 0.55 MG/DL (ref 0.8–1.5)

## 2024-10-21 PROCEDURE — 82565 ASSAY OF CREATININE: CPT

## 2024-10-21 PROCEDURE — 6360000004 HC RX CONTRAST MEDICATION: Performed by: SURGERY

## 2024-10-21 PROCEDURE — 74177 CT ABD & PELVIS W/CONTRAST: CPT

## 2024-10-21 RX ORDER — IOPAMIDOL 755 MG/ML
100 INJECTION, SOLUTION INTRAVASCULAR
Status: COMPLETED | OUTPATIENT
Start: 2024-10-21 | End: 2024-10-21

## 2024-10-21 RX ORDER — DIATRIZOATE MEGLUMINE AND DIATRIZOATE SODIUM 660; 100 MG/ML; MG/ML
15 SOLUTION ORAL; RECTAL
Status: DISCONTINUED | OUTPATIENT
Start: 2024-10-21 | End: 2024-10-25 | Stop reason: HOSPADM

## 2024-10-21 RX ADMIN — IOPAMIDOL 100 ML: 755 INJECTION, SOLUTION INTRAVENOUS at 10:19

## 2024-10-21 RX ADMIN — DIATRIZOATE MEGLUMINE AND DIATRIZOATE SODIUM 15 ML: 660; 100 LIQUID ORAL; RECTAL at 10:19

## 2024-11-07 ENCOUNTER — OFFICE VISIT (OUTPATIENT)
Dept: SURGERY | Age: 49
End: 2024-11-07
Payer: COMMERCIAL

## 2024-11-07 VITALS — WEIGHT: 227 LBS | BODY MASS INDEX: 31.78 KG/M2 | HEIGHT: 71 IN

## 2024-11-07 DIAGNOSIS — R19.00 INTRAABDOMINAL MASS: Primary | ICD-10-CM

## 2024-11-07 PROCEDURE — 99214 OFFICE O/P EST MOD 30 MIN: CPT | Performed by: SURGERY

## 2024-11-07 NOTE — PROGRESS NOTES
of treatment;    or  ?2or more stable chronic illnesses;    or  ?1undiagnosed new problem with uncertain prognosis;    or  ?1acute illness with systemic symptoms;    or  ?1acute complicated injury   Moderate  (Must meet the requirements of at least 1 out of 3 categories)  Category 1: Tests, documents, or independent historian(s)  ?Any combination of 3 from the following:   ?Review of prior external note(s) from each unique source*;  ?Review of the result(s) of each unique test*;  ?Ordering of each unique test*;  ?Assessment requiring an independent historian(s)    or  Category 2: Independent interpretation of tests   ?Independent interpretation of a test performed by another physician/other qualified health care professional (not separately reported);     or  Category 3: Discussion of management or test interpretation  ?Discussion of management or test interpretation with external physician/other qualified health care professional/appropriate source (not separately reported)   Moderate risk of morbidity from additional diagnostic testing or treatment  Examples only:  ?Prescription drug management   ?Decision regarding minor surgery with identified patient or procedure risk factors  ?Decision regarding elective major surgery without identified patient or procedure risk factors   ?Diagnosis or treatment significantly limited by social determinants of health       50799  20177 High High  ?1or more chronic illnesses with severe exacerbation, progression, or side effects of treatment;    or  ?1 acute or chronic illness or injury that poses a threat to life or bodily function - abd mass with cirrhosis, portal HTN and acute renal failure   Extensive  (Must meet the requirements of at least 2 out of 3 categories)  Category 1: Tests, documents, or independent historian(s)  ?Any combination of 3 from the following:   ?Review of prior external note(s) from each unique source*;  ?Review of the result(s) of each unique test*;

## 2024-12-16 ENCOUNTER — OFFICE VISIT (OUTPATIENT)
Dept: FAMILY MEDICINE CLINIC | Facility: CLINIC | Age: 49
End: 2024-12-16
Payer: COMMERCIAL

## 2024-12-16 VITALS
DIASTOLIC BLOOD PRESSURE: 72 MMHG | HEART RATE: 68 BPM | HEIGHT: 71 IN | WEIGHT: 214.8 LBS | SYSTOLIC BLOOD PRESSURE: 134 MMHG | BODY MASS INDEX: 30.07 KG/M2 | OXYGEN SATURATION: 98 %

## 2024-12-16 DIAGNOSIS — F10.11 HISTORY OF ALCOHOL ABUSE: ICD-10-CM

## 2024-12-16 DIAGNOSIS — E11.65 TYPE 2 DIABETES MELLITUS WITH HYPERGLYCEMIA, WITHOUT LONG-TERM CURRENT USE OF INSULIN (HCC): Primary | ICD-10-CM

## 2024-12-16 DIAGNOSIS — F41.8 DEPRESSION WITH ANXIETY: ICD-10-CM

## 2024-12-16 LAB
ALBUMIN SERPL-MCNC: 4.1 G/DL (ref 3.5–5)
ALBUMIN/GLOB SERPL: 1.2 (ref 1–1.9)
ALP SERPL-CCNC: 112 U/L (ref 40–129)
ALT SERPL-CCNC: 23 U/L (ref 8–55)
ANION GAP SERPL CALC-SCNC: 10 MMOL/L (ref 7–16)
AST SERPL-CCNC: 21 U/L (ref 15–37)
BILIRUB SERPL-MCNC: 0.3 MG/DL (ref 0–1.2)
BUN SERPL-MCNC: 7 MG/DL (ref 6–23)
CALCIUM SERPL-MCNC: 9.8 MG/DL (ref 8.8–10.2)
CHLORIDE SERPL-SCNC: 95 MMOL/L (ref 98–107)
CO2 SERPL-SCNC: 30 MMOL/L (ref 20–29)
CREAT SERPL-MCNC: 0.74 MG/DL (ref 0.8–1.3)
CREAT UR-MCNC: 16.9 MG/DL (ref 39–259)
EST. AVERAGE GLUCOSE BLD GHB EST-MCNC: 243 MG/DL
GLOBULIN SER CALC-MCNC: 3.4 G/DL (ref 2.3–3.5)
GLUCOSE SERPL-MCNC: 210 MG/DL (ref 70–99)
HBA1C MFR BLD: 10.1 % (ref 0–5.6)
MICROALBUMIN UR-MCNC: <1.2 MG/DL (ref 0–20)
MICROALBUMIN/CREAT UR-RTO: ABNORMAL MG/G (ref 0–30)
POTASSIUM SERPL-SCNC: 4.4 MMOL/L (ref 3.5–5.1)
PROT SERPL-MCNC: 7.4 G/DL (ref 6.3–8.2)
SODIUM SERPL-SCNC: 135 MMOL/L (ref 136–145)

## 2024-12-16 PROCEDURE — 3078F DIAST BP <80 MM HG: CPT | Performed by: FAMILY MEDICINE

## 2024-12-16 PROCEDURE — 3052F HG A1C>EQUAL 8.0%<EQUAL 9.0%: CPT | Performed by: FAMILY MEDICINE

## 2024-12-16 PROCEDURE — 99214 OFFICE O/P EST MOD 30 MIN: CPT | Performed by: FAMILY MEDICINE

## 2024-12-16 PROCEDURE — 3075F SYST BP GE 130 - 139MM HG: CPT | Performed by: FAMILY MEDICINE

## 2024-12-16 ASSESSMENT — PATIENT HEALTH QUESTIONNAIRE - PHQ9
5. POOR APPETITE OR OVEREATING: NOT AT ALL
SUM OF ALL RESPONSES TO PHQ QUESTIONS 1-9: 0
2. FEELING DOWN, DEPRESSED OR HOPELESS: NOT AT ALL
SUM OF ALL RESPONSES TO PHQ QUESTIONS 1-9: 0
SUM OF ALL RESPONSES TO PHQ QUESTIONS 1-9: 0
10. IF YOU CHECKED OFF ANY PROBLEMS, HOW DIFFICULT HAVE THESE PROBLEMS MADE IT FOR YOU TO DO YOUR WORK, TAKE CARE OF THINGS AT HOME, OR GET ALONG WITH OTHER PEOPLE: NOT DIFFICULT AT ALL
8. MOVING OR SPEAKING SO SLOWLY THAT OTHER PEOPLE COULD HAVE NOTICED. OR THE OPPOSITE, BEING SO FIGETY OR RESTLESS THAT YOU HAVE BEEN MOVING AROUND A LOT MORE THAN USUAL: NOT AT ALL
1. LITTLE INTEREST OR PLEASURE IN DOING THINGS: NOT AT ALL
SUM OF ALL RESPONSES TO PHQ9 QUESTIONS 1 & 2: 0
9. THOUGHTS THAT YOU WOULD BE BETTER OFF DEAD, OR OF HURTING YOURSELF: NOT AT ALL
6. FEELING BAD ABOUT YOURSELF - OR THAT YOU ARE A FAILURE OR HAVE LET YOURSELF OR YOUR FAMILY DOWN: NOT AT ALL
SUM OF ALL RESPONSES TO PHQ QUESTIONS 1-9: 0
3. TROUBLE FALLING OR STAYING ASLEEP: NOT AT ALL
4. FEELING TIRED OR HAVING LITTLE ENERGY: NOT AT ALL
7. TROUBLE CONCENTRATING ON THINGS, SUCH AS READING THE NEWSPAPER OR WATCHING TELEVISION: NOT AT ALL

## 2024-12-16 ASSESSMENT — ENCOUNTER SYMPTOMS
CONSTIPATION: 0
ABDOMINAL PAIN: 0
BLOOD IN STOOL: 0
DIARRHEA: 0
ABDOMINAL DISTENTION: 0
NAUSEA: 0
ANAL BLEEDING: 0
RESPIRATORY NEGATIVE: 1

## 2024-12-16 NOTE — PROGRESS NOTES
PROGRESS NOTE    SUBJECTIVE:   Papito Morillo Jr is a 49 y.o. male seen for a follow up visit regarding   Chief Complaint   Patient presents with    Diabetes     Follow up        HPI:  49-year-old male with history of chronic alcohol abuse, alcoholic cirrhosis with varices, intraperitoneal variceal bleed, chronic pancreatitis, follows up today for diabetes.  He reports that he is doing well, he has been dry for couple of months now.  Clinically well.  Recently had follow-up visit with general surgery, repeat CT scans showed resolution of i hematoma.  Liver appeared normal, spleen continues to demonstrate enlargement.         Reviewed and updated this visit by provider:           Review of Systems   Respiratory: Negative.     Cardiovascular: Negative.    Gastrointestinal:  Negative for abdominal distention, abdominal pain, anal bleeding, blood in stool, constipation, diarrhea and nausea.          OBJECTIVE:  Vitals:    12/16/24 1345   BP: 134/72   Site: Left Upper Arm   Cuff Size: Medium Adult   Pulse: 68   SpO2: 98%   Weight: 97.4 kg (214 lb 12.8 oz)   Height: 1.81 m (5' 11.26\")        Physical Exam   General: Alert and oriented x3, well-appearing  Neck: No adenopathy, thyromegaly or thyroid nodules  Pulmonary: Normal effort, good airflow, no rales or rhonchi  CVS: Regular rate and rhythm, normal S1, S2, no S3 or S4, no murmurs; no carotid bruits, 2+ pedal pulses  Abdomen: Nondistended with normal bowel sounds, no mass or tenderness    Medical problems and test results were reviewed with the patient today.     Recent Results (from the past 672 hour(s))   Microalbumin / Creatinine Urine Ratio    Collection Time: 12/16/24  2:43 PM   Result Value Ref Range    Microalb, Ur <1.20 0.00 - 20.00 MG/DL    Creatinine, Ur 16.90 (L) 39.00 - 259.00 mg/dL    Microalb/Creat Ratio  0 - 30 mg/g     Cannot calculate ratio due to microalbumin result outside reportable range.       Results for orders placed or performed in

## 2025-01-15 ENCOUNTER — OFFICE VISIT (OUTPATIENT)
Dept: FAMILY MEDICINE CLINIC | Facility: CLINIC | Age: 50
End: 2025-01-15
Payer: COMMERCIAL

## 2025-01-15 VITALS
HEART RATE: 62 BPM | BODY MASS INDEX: 29.93 KG/M2 | HEIGHT: 71 IN | OXYGEN SATURATION: 99 % | DIASTOLIC BLOOD PRESSURE: 70 MMHG | SYSTOLIC BLOOD PRESSURE: 130 MMHG | WEIGHT: 213.8 LBS

## 2025-01-15 DIAGNOSIS — E11.65 TYPE 2 DIABETES MELLITUS WITH HYPERGLYCEMIA, WITHOUT LONG-TERM CURRENT USE OF INSULIN (HCC): ICD-10-CM

## 2025-01-15 PROCEDURE — 99213 OFFICE O/P EST LOW 20 MIN: CPT | Performed by: FAMILY MEDICINE

## 2025-01-15 PROCEDURE — 3078F DIAST BP <80 MM HG: CPT | Performed by: FAMILY MEDICINE

## 2025-01-15 PROCEDURE — 3075F SYST BP GE 130 - 139MM HG: CPT | Performed by: FAMILY MEDICINE

## 2025-01-15 RX ORDER — METFORMIN HYDROCHLORIDE 500 MG/1
TABLET, EXTENDED RELEASE ORAL
Qty: 270 TABLET | Refills: 3 | Status: SHIPPED | OUTPATIENT
Start: 2025-01-15

## 2025-01-15 SDOH — ECONOMIC STABILITY: FOOD INSECURITY: WITHIN THE PAST 12 MONTHS, THE FOOD YOU BOUGHT JUST DIDN'T LAST AND YOU DIDN'T HAVE MONEY TO GET MORE.: NEVER TRUE

## 2025-01-15 SDOH — ECONOMIC STABILITY: FOOD INSECURITY: WITHIN THE PAST 12 MONTHS, YOU WORRIED THAT YOUR FOOD WOULD RUN OUT BEFORE YOU GOT MONEY TO BUY MORE.: NEVER TRUE

## 2025-01-15 ASSESSMENT — PATIENT HEALTH QUESTIONNAIRE - PHQ9
SUM OF ALL RESPONSES TO PHQ QUESTIONS 1-9: 0
SUM OF ALL RESPONSES TO PHQ QUESTIONS 1-9: 0
6. FEELING BAD ABOUT YOURSELF - OR THAT YOU ARE A FAILURE OR HAVE LET YOURSELF OR YOUR FAMILY DOWN: NOT AT ALL
SUM OF ALL RESPONSES TO PHQ QUESTIONS 1-9: 0
8. MOVING OR SPEAKING SO SLOWLY THAT OTHER PEOPLE COULD HAVE NOTICED. OR THE OPPOSITE, BEING SO FIGETY OR RESTLESS THAT YOU HAVE BEEN MOVING AROUND A LOT MORE THAN USUAL: NOT AT ALL
7. TROUBLE CONCENTRATING ON THINGS, SUCH AS READING THE NEWSPAPER OR WATCHING TELEVISION: NOT AT ALL
10. IF YOU CHECKED OFF ANY PROBLEMS, HOW DIFFICULT HAVE THESE PROBLEMS MADE IT FOR YOU TO DO YOUR WORK, TAKE CARE OF THINGS AT HOME, OR GET ALONG WITH OTHER PEOPLE: NOT DIFFICULT AT ALL
4. FEELING TIRED OR HAVING LITTLE ENERGY: NOT AT ALL
3. TROUBLE FALLING OR STAYING ASLEEP: NOT AT ALL
1. LITTLE INTEREST OR PLEASURE IN DOING THINGS: NOT AT ALL
5. POOR APPETITE OR OVEREATING: NOT AT ALL
9. THOUGHTS THAT YOU WOULD BE BETTER OFF DEAD, OR OF HURTING YOURSELF: NOT AT ALL
2. FEELING DOWN, DEPRESSED OR HOPELESS: NOT AT ALL
SUM OF ALL RESPONSES TO PHQ9 QUESTIONS 1 & 2: 0
SUM OF ALL RESPONSES TO PHQ QUESTIONS 1-9: 0

## 2025-01-15 ASSESSMENT — ENCOUNTER SYMPTOMS: RESPIRATORY NEGATIVE: 1

## 2025-01-15 NOTE — PROGRESS NOTES
PROGRESS NOTE    SUBJECTIVE:   Papito Morillo Jr is a 49 y.o. male seen for a follow up visit regarding   Chief Complaint   Patient presents with    Diabetes     Discuss recent lab results        HPI:  Here today to discuss recent labs, A1c was very high.  He endorses that after he saw this he has cut back on eating candy, especially M&Ms.  He states that his sugars are coming down.         Reviewed and updated this visit by provider:           Review of Systems   Respiratory: Negative.     Cardiovascular: Negative.           OBJECTIVE:  Vitals:    01/15/25 1404   BP: 130/70   Site: Left Upper Arm   Cuff Size: Medium Adult   Pulse: 62   SpO2: 99%   Weight: 97 kg (213 lb 12.8 oz)   Height: 1.81 m (5' 11.26\")        Physical Exam   General: Alert and oriented x3, well-appearing  Neck: No adenopathy, thyromegaly or thyroid nodules  Pulmonary: Normal effort, good airflow, no rales or rhonchi  CVS: Regular rate and rhythm, normal S1, S2, no S3 or S4, no murmurs; no carotid bruits, 2+ pedal pulses      Medical problems and test results were reviewed with the patient today.     No results found for this or any previous visit (from the past 672 hour(s)).    No results found for any visits on 01/15/25.     ASSESSMENT and PLAN    1. Type 2 diabetes mellitus with hyperglycemia, without long-term current use of insulin (HCC), will increase metformin  -     metFORMIN (GLUCOPHAGE-XR) 500 MG extended release tablet; Take 1 pill by mouth with breakfast and 2 with supper, Disp-270 tablet, R-3Normal           Return Late March.       Reji Roque MD

## 2025-03-26 ENCOUNTER — OFFICE VISIT (OUTPATIENT)
Dept: FAMILY MEDICINE CLINIC | Facility: CLINIC | Age: 50
End: 2025-03-26
Payer: COMMERCIAL

## 2025-03-26 ENCOUNTER — RESULTS FOLLOW-UP (OUTPATIENT)
Dept: FAMILY MEDICINE CLINIC | Facility: CLINIC | Age: 50
End: 2025-03-26

## 2025-03-26 VITALS
WEIGHT: 214.8 LBS | SYSTOLIC BLOOD PRESSURE: 142 MMHG | HEIGHT: 71 IN | RESPIRATION RATE: 16 BRPM | BODY MASS INDEX: 30.07 KG/M2 | DIASTOLIC BLOOD PRESSURE: 86 MMHG

## 2025-03-26 DIAGNOSIS — E87.1 HYPONATREMIA: ICD-10-CM

## 2025-03-26 DIAGNOSIS — E11.65 TYPE 2 DIABETES MELLITUS WITH HYPERGLYCEMIA, WITHOUT LONG-TERM CURRENT USE OF INSULIN (HCC): ICD-10-CM

## 2025-03-26 DIAGNOSIS — K86.1 OTHER CHRONIC PANCREATITIS: ICD-10-CM

## 2025-03-26 DIAGNOSIS — K76.6 PORTAL HYPERTENSION (HCC): ICD-10-CM

## 2025-03-26 DIAGNOSIS — I10 PRIMARY HYPERTENSION: Primary | ICD-10-CM

## 2025-03-26 LAB
ANION GAP SERPL CALC-SCNC: 9 MMOL/L (ref 7–16)
BUN SERPL-MCNC: 9 MG/DL (ref 6–23)
CALCIUM SERPL-MCNC: 9.9 MG/DL (ref 8.8–10.2)
CHLORIDE SERPL-SCNC: 99 MMOL/L (ref 98–107)
CO2 SERPL-SCNC: 31 MMOL/L (ref 20–29)
CREAT SERPL-MCNC: 0.67 MG/DL (ref 0.8–1.3)
EST. AVERAGE GLUCOSE BLD GHB EST-MCNC: 202 MG/DL
GLUCOSE SERPL-MCNC: 113 MG/DL (ref 70–99)
HBA1C MFR BLD: 8.7 % (ref 0–5.6)
POTASSIUM SERPL-SCNC: 4.7 MMOL/L (ref 3.5–5.1)
SODIUM SERPL-SCNC: 139 MMOL/L (ref 136–145)

## 2025-03-26 PROCEDURE — 3079F DIAST BP 80-89 MM HG: CPT | Performed by: FAMILY MEDICINE

## 2025-03-26 PROCEDURE — 99214 OFFICE O/P EST MOD 30 MIN: CPT | Performed by: FAMILY MEDICINE

## 2025-03-26 PROCEDURE — 3052F HG A1C>EQUAL 8.0%<EQUAL 9.0%: CPT | Performed by: FAMILY MEDICINE

## 2025-03-26 PROCEDURE — 3077F SYST BP >= 140 MM HG: CPT | Performed by: FAMILY MEDICINE

## 2025-03-26 RX ORDER — VALSARTAN 40 MG/1
40 TABLET ORAL DAILY
Qty: 30 TABLET | Refills: 3 | Status: SHIPPED | OUTPATIENT
Start: 2025-03-26

## 2025-03-26 ASSESSMENT — ENCOUNTER SYMPTOMS: RESPIRATORY NEGATIVE: 1

## 2025-03-27 NOTE — PROGRESS NOTES
PROGRESS NOTE    SUBJECTIVE:   Papito Morillo Jr is a 49 y.o. male seen for a follow up visit regarding   Chief Complaint   Patient presents with    Follow-up Chronic Condition     Look at bump on left hand. States that it has been there forever. Doesn't hurt, but does feel sore every once in a while.       GAPS: Foot exam, eye exam, Colon    Diabetes        HPI:  Here for follow-up of chronic problems.  Overall he is doing relatively well, he has been taking his metformin and his blood pressures have not come down.         Reviewed and updated this visit by provider:           Review of Systems   Respiratory: Negative.     Cardiovascular: Negative.           OBJECTIVE:  Vitals:    03/26/25 1428   BP: (!) 142/86   Resp: 16   Weight: 97.4 kg (214 lb 12.8 oz)   Height: 1.81 m (5' 11.26\")        Physical Exam   General: Alert and oriented x3, well-appearing  Neck: No adenopathy, thyromegaly or thyroid nodules  Pulmonary: Normal effort, good airflow, no rales or rhonchi  CVS: Regular rate and rhythm, normal S1, S2, no S3 or S4, no murmurs; no carotid bruits, 2+ pedal pulses      Medical problems and test results were reviewed with the patient today.     Recent Results (from the past 4 weeks)   Hemoglobin A1C    Collection Time: 03/26/25  3:20 PM   Result Value Ref Range    Hemoglobin A1C 8.7 (H) 0 - 5.6 %    Estimated Avg Glucose 202 mg/dL   Basic Metabolic Panel    Collection Time: 03/26/25  3:20 PM   Result Value Ref Range    Sodium 139 136 - 145 mmol/L    Potassium 4.7 3.5 - 5.1 mmol/L    Chloride 99 98 - 107 mmol/L    CO2 31 (H) 20 - 29 mmol/L    Anion Gap 9 7 - 16 mmol/L    Glucose 113 (H) 70 - 99 mg/dL    BUN 9 6 - 23 MG/DL    Creatinine 0.67 (L) 0.80 - 1.30 MG/DL    Est, Glom Filt Rate >90 >60 ml/min/1.73m2    Calcium 9.9 8.8 - 10.2 MG/DL       Results for orders placed or performed in visit on 03/26/25   Hemoglobin A1C   Result Value Ref Range    Hemoglobin A1C 8.7 (H) 0 - 5.6 %    Estimated Avg Glucose

## 2025-04-01 ENCOUNTER — CLINICAL SUPPORT (OUTPATIENT)
Dept: FAMILY MEDICINE CLINIC | Facility: CLINIC | Age: 50
End: 2025-04-01

## 2025-04-01 VITALS — DIASTOLIC BLOOD PRESSURE: 76 MMHG | SYSTOLIC BLOOD PRESSURE: 130 MMHG

## 2025-05-20 NOTE — CONSULTS
UROLOGY PROGRESS NOTE   [facility]  [address]  [address]  [place] [address]  [phone]  Fax:[phone]  [email or web address]      NAME: Bobbi Swift  Uvaldo Tan MD   Authorized by  Uvaldo Tan MD       Universal [facility])     [facility])    Stage 3a chronic kidney disease ([facility])    Proteinuria    IgM kappa monoclonal gammopathy    Moderate episode of recurrent major depressive disorder ([facility])    Overactive bladder    Polyneuropathy, unspecified    Posterior tibial tendon dysfunction (PTTD) of left lower extremity    Drug-induced polyneuropathy ([facility])    or  Category 3: Discussion of management or test interpretation  ?Discussion of management or test interpretation with external physician/other qualified health care professional/appropriate source (not separately reported)   Moderate risk of morbidity from additional diagnostic testing or treatment  Examples only:  ?Prescription drug management   ?Decision regarding minor surgery with identified patient or procedure risk factors  ?Decision regarding elective major surgery without identified patient or procedure risk factors   ?Diagnosis or treatment significantly limited by social determinants of health       69815  [id number] High High  ?1or more chronic illnesses with severe exacerbation, progression, or side effects of treatment;    or  ?1 acute or chronic illness or injury that poses a threat to life or bodily function - abd mass with cirrhosis, portal HTN and acute renal failure   Extensive  (Must meet the requirements of at least 2 out of 3 categories)  Category 1: Tests, documents, or independent historian(s)  ?Any combination of 3 from the following:   ?Review of prior external note(s) from each unique source*;  ?Review of the result(s) of each unique test*;   ?Ordering of each unique test*;   ?Assessment requiring an independent historian(s)    or   Category 2: Independent interpretation of tests   ?Independent interpretation of a test performed by another physician/other qualified health care professional (not separately reported);     or  Category 3: Discussion of management or test interpretation  ?Discussion of management or test interpretation with external physician/other qualified health care professional/appropriate source (not separately reported)   High risk of morbidity from additional diagnostic testing or treatment  Examples only:  ?Drug therapy requiring intensive monitoring for toxicity  ?Decision regarding elective major surgery with identified patient or procedure risk factors  ?Decision

## 2025-05-23 ENCOUNTER — COMMUNITY OUTREACH (OUTPATIENT)
Dept: FAMILY MEDICINE CLINIC | Facility: CLINIC | Age: 50
End: 2025-05-23

## 2025-07-15 ENCOUNTER — OFFICE VISIT (OUTPATIENT)
Dept: FAMILY MEDICINE CLINIC | Facility: CLINIC | Age: 50
End: 2025-07-15
Payer: COMMERCIAL

## 2025-07-15 VITALS
OXYGEN SATURATION: 99 % | TEMPERATURE: 97.4 F | DIASTOLIC BLOOD PRESSURE: 79 MMHG | SYSTOLIC BLOOD PRESSURE: 142 MMHG | WEIGHT: 207 LBS | HEART RATE: 58 BPM | BODY MASS INDEX: 28.66 KG/M2

## 2025-07-15 DIAGNOSIS — K70.30 ALCOHOLIC CIRRHOSIS OF LIVER WITHOUT ASCITES (HCC): ICD-10-CM

## 2025-07-15 DIAGNOSIS — E11.65 TYPE 2 DIABETES MELLITUS WITH HYPERGLYCEMIA, WITHOUT LONG-TERM CURRENT USE OF INSULIN (HCC): ICD-10-CM

## 2025-07-15 DIAGNOSIS — I10 PRIMARY HYPERTENSION: Primary | ICD-10-CM

## 2025-07-15 DIAGNOSIS — Z72.0 TOBACCO USE: ICD-10-CM

## 2025-07-15 DIAGNOSIS — F10.11 ALCOHOL ABUSE, IN REMISSION: ICD-10-CM

## 2025-07-15 DIAGNOSIS — E87.1 HYPONATREMIA: ICD-10-CM

## 2025-07-15 PROBLEM — R73.9 HYPERGLYCEMIA: Status: RESOLVED | Noted: 2020-01-14 | Resolved: 2025-07-15

## 2025-07-15 PROBLEM — K86.1 CHRONIC PANCREATITIS (HCC): Status: ACTIVE | Noted: 2020-01-14

## 2025-07-15 PROBLEM — A41.9 SEPSIS (HCC): Status: RESOLVED | Noted: 2024-03-22 | Resolved: 2025-07-15

## 2025-07-15 PROBLEM — F10.10 ALCOHOL ABUSE: Status: RESOLVED | Noted: 2020-01-14 | Resolved: 2025-07-15

## 2025-07-15 PROBLEM — N17.9 AKI (ACUTE KIDNEY INJURY): Status: RESOLVED | Noted: 2020-01-14 | Resolved: 2025-07-15

## 2025-07-15 LAB
ALBUMIN SERPL-MCNC: 4.1 G/DL (ref 3.5–5)
ALBUMIN/GLOB SERPL: 1.2 (ref 1–1.9)
ALP SERPL-CCNC: 85 U/L (ref 40–129)
ALT SERPL-CCNC: 32 U/L (ref 8–55)
ANION GAP SERPL CALC-SCNC: 11 MMOL/L (ref 7–16)
AST SERPL-CCNC: 26 U/L (ref 15–37)
BASOPHILS # BLD: 0.05 K/UL (ref 0–0.2)
BASOPHILS NFR BLD: 0.9 % (ref 0–2)
BILIRUB SERPL-MCNC: 0.5 MG/DL (ref 0–1.2)
BUN SERPL-MCNC: 7 MG/DL (ref 6–23)
CALCIUM SERPL-MCNC: 10.1 MG/DL (ref 8.8–10.2)
CHLORIDE SERPL-SCNC: 100 MMOL/L (ref 98–107)
CO2 SERPL-SCNC: 27 MMOL/L (ref 20–29)
CREAT SERPL-MCNC: 0.67 MG/DL (ref 0.8–1.3)
DIFFERENTIAL METHOD BLD: ABNORMAL
EOSINOPHIL # BLD: 0.11 K/UL (ref 0–0.8)
EOSINOPHIL NFR BLD: 1.9 % (ref 0.5–7.8)
ERYTHROCYTE [DISTWIDTH] IN BLOOD BY AUTOMATED COUNT: 11.9 % (ref 11.9–14.6)
EST. AVERAGE GLUCOSE BLD GHB EST-MCNC: 186 MG/DL
GLOBULIN SER CALC-MCNC: 3.4 G/DL (ref 2.3–3.5)
GLUCOSE SERPL-MCNC: 132 MG/DL (ref 70–99)
HBA1C MFR BLD: 8.1 % (ref 0–5.6)
HCT VFR BLD AUTO: 41.6 % (ref 41.1–50.3)
HGB BLD-MCNC: 13.9 G/DL (ref 13.6–17.2)
IMM GRANULOCYTES # BLD AUTO: 0.01 K/UL (ref 0–0.5)
IMM GRANULOCYTES NFR BLD AUTO: 0.2 % (ref 0–5)
LYMPHOCYTES # BLD: 2.51 K/UL (ref 0.5–4.6)
LYMPHOCYTES NFR BLD: 44 % (ref 13–44)
MCH RBC QN AUTO: 30.8 PG (ref 26.1–32.9)
MCHC RBC AUTO-ENTMCNC: 33.4 G/DL (ref 31.4–35)
MCV RBC AUTO: 92 FL (ref 82–102)
MONOCYTES # BLD: 0.42 K/UL (ref 0.1–1.3)
MONOCYTES NFR BLD: 7.4 % (ref 4–12)
NEUTS SEG # BLD: 2.61 K/UL (ref 1.7–8.2)
NEUTS SEG NFR BLD: 45.6 % (ref 43–78)
NRBC # BLD: 0 K/UL (ref 0–0.2)
PLATELET # BLD AUTO: 75 K/UL (ref 150–450)
PMV BLD AUTO: 11.4 FL (ref 9.4–12.3)
POTASSIUM SERPL-SCNC: 4.2 MMOL/L (ref 3.5–5.1)
PROT SERPL-MCNC: 7.5 G/DL (ref 6.3–8.2)
RBC # BLD AUTO: 4.52 M/UL (ref 4.23–5.6)
SODIUM SERPL-SCNC: 138 MMOL/L (ref 136–145)
WBC # BLD AUTO: 5.7 K/UL (ref 4.3–11.1)

## 2025-07-15 PROCEDURE — 99214 OFFICE O/P EST MOD 30 MIN: CPT | Performed by: PHYSICIAN ASSISTANT

## 2025-07-15 PROCEDURE — 3077F SYST BP >= 140 MM HG: CPT | Performed by: PHYSICIAN ASSISTANT

## 2025-07-15 PROCEDURE — 3052F HG A1C>EQUAL 8.0%<EQUAL 9.0%: CPT | Performed by: PHYSICIAN ASSISTANT

## 2025-07-15 PROCEDURE — 3078F DIAST BP <80 MM HG: CPT | Performed by: PHYSICIAN ASSISTANT

## 2025-07-15 RX ORDER — VALSARTAN 80 MG/1
80 TABLET ORAL DAILY
Qty: 90 TABLET | Refills: 0 | Status: SHIPPED | OUTPATIENT
Start: 2025-07-15

## 2025-07-15 NOTE — PROGRESS NOTES
Jennifer Cassidy PA-C, [facility], MPH  [facility]  [address], [address]  [place] [address]  Phone [phone]    SUBJECTIVE  Chief Complaint   Patient presents with    Medication Refill     HPI   Papito Morillo Jr is a 50 y.o. male with PMH as below presents for an acute visit. He is accompanied today by his mother, Kaitlyn, who augments the history.     Pt formerly followed with Dr. Roque- last appt 3/26/25.     Pt saw GI (Dr. Mejia) on 4/25/24 for new pt consult for alcoholic cirrhosis with ascites. Pt does have hx of cirrhosis with extensive varices in the upper abdomen including prominent gastric varices    HTN-- on valsartan 40mg/d. Reports compliance on this and has not yet run out. Not checking BP at home currently. Denies skipping doses.    Smokes 1/2 ppd. Hasn't really considered quitting.     Alcohol-- states he has not drank since March 2024. Not doing AA or sobriety program and is not interested in this.     T2DM-- only taking Jardiance. NOT taking metformin XL since last (previously taking 1500mg per day), although he states he tolerated this well. Fasting BG today was 123. Denies hypos. States he has not taken metformin in several months.     Review of Systems denies abdominal pain or swelling, blood in stool, melena, hematochezia, SOB, chest pain     Patient Active Problem List   Diagnosis    Chronic pancreatitis (HCC)    Hyponatremia    Chronic pain    Primary hypertension    Depression    Tobacco use    Alcoholic cirrhosis of liver without ascites (HCC)    Hyperbilirubinemia    Perisplenic hematoma    Intraabdominal hematoma on CT 3/22/24 (between stomach spleen and pancreas) - resolved on CT    ETOH abuse    Hemoperitoneum    Gastric varices    Left upper quadrant abdominal mass    Esophageal varices in cirrhosis (HCC)    Other ascites    Type 2 diabetes mellitus with hyperglycemia, without long-term current use of insulin (HCC)    Alcohol abuse, in

## 2025-07-16 ENCOUNTER — RESULTS FOLLOW-UP (OUTPATIENT)
Dept: FAMILY MEDICINE CLINIC | Facility: CLINIC | Age: 50
End: 2025-07-16

## 2025-07-16 DIAGNOSIS — E11.65 TYPE 2 DIABETES MELLITUS WITH HYPERGLYCEMIA, WITHOUT LONG-TERM CURRENT USE OF INSULIN (HCC): ICD-10-CM

## 2025-07-16 RX ORDER — METFORMIN HYDROCHLORIDE 500 MG/1
TABLET, EXTENDED RELEASE ORAL
Qty: 270 TABLET | Refills: 1 | Status: SHIPPED | OUTPATIENT
Start: 2025-07-16 | End: 2025-07-30

## 2025-08-05 ENCOUNTER — OFFICE VISIT (OUTPATIENT)
Dept: FAMILY MEDICINE CLINIC | Facility: CLINIC | Age: 50
End: 2025-08-05
Payer: COMMERCIAL

## 2025-08-05 VITALS
WEIGHT: 209 LBS | DIASTOLIC BLOOD PRESSURE: 62 MMHG | OXYGEN SATURATION: 98 % | SYSTOLIC BLOOD PRESSURE: 118 MMHG | TEMPERATURE: 98 F | RESPIRATION RATE: 16 BRPM | BODY MASS INDEX: 28.94 KG/M2 | HEART RATE: 57 BPM

## 2025-08-05 DIAGNOSIS — Z00.00 HEALTHCARE MAINTENANCE: ICD-10-CM

## 2025-08-05 DIAGNOSIS — E11.65 TYPE 2 DIABETES MELLITUS WITH HYPERGLYCEMIA, WITHOUT LONG-TERM CURRENT USE OF INSULIN (HCC): ICD-10-CM

## 2025-08-05 DIAGNOSIS — I10 PRIMARY HYPERTENSION: Primary | ICD-10-CM

## 2025-08-05 DIAGNOSIS — I10 PRIMARY HYPERTENSION: ICD-10-CM

## 2025-08-05 PROBLEM — E87.1 HYPONATREMIA: Status: RESOLVED | Noted: 2020-01-14 | Resolved: 2025-08-05

## 2025-08-05 PROBLEM — E80.6 HYPERBILIRUBINEMIA: Status: RESOLVED | Noted: 2024-03-22 | Resolved: 2025-08-05

## 2025-08-05 PROBLEM — F10.10 ETOH ABUSE: Status: RESOLVED | Noted: 2024-03-24 | Resolved: 2025-08-05

## 2025-08-05 LAB
ANION GAP SERPL CALC-SCNC: 11 MMOL/L (ref 7–16)
BUN SERPL-MCNC: 8 MG/DL (ref 6–23)
CALCIUM SERPL-MCNC: 9.9 MG/DL (ref 8.8–10.2)
CHLORIDE SERPL-SCNC: 98 MMOL/L (ref 98–107)
CO2 SERPL-SCNC: 30 MMOL/L (ref 20–29)
CREAT SERPL-MCNC: 0.83 MG/DL (ref 0.8–1.3)
GLUCOSE SERPL-MCNC: 144 MG/DL (ref 70–99)
POTASSIUM SERPL-SCNC: 4.9 MMOL/L (ref 3.5–5.1)
SODIUM SERPL-SCNC: 138 MMOL/L (ref 136–145)

## 2025-08-05 PROCEDURE — 3052F HG A1C>EQUAL 8.0%<EQUAL 9.0%: CPT | Performed by: PHYSICIAN ASSISTANT

## 2025-08-05 PROCEDURE — 99213 OFFICE O/P EST LOW 20 MIN: CPT | Performed by: PHYSICIAN ASSISTANT

## 2025-08-05 PROCEDURE — 3074F SYST BP LT 130 MM HG: CPT | Performed by: PHYSICIAN ASSISTANT

## 2025-08-05 PROCEDURE — 3078F DIAST BP <80 MM HG: CPT | Performed by: PHYSICIAN ASSISTANT

## 2025-08-05 RX ORDER — VALSARTAN 80 MG/1
80 TABLET ORAL DAILY
Qty: 90 TABLET | Refills: 2 | Status: SHIPPED | OUTPATIENT
Start: 2025-08-05

## 2025-09-04 ENCOUNTER — OFFICE VISIT (OUTPATIENT)
Dept: GASTROENTEROLOGY | Age: 50
End: 2025-09-04
Payer: COMMERCIAL

## 2025-09-04 VITALS
HEART RATE: 60 BPM | DIASTOLIC BLOOD PRESSURE: 72 MMHG | HEIGHT: 72 IN | SYSTOLIC BLOOD PRESSURE: 125 MMHG | TEMPERATURE: 98 F | BODY MASS INDEX: 28.31 KG/M2 | RESPIRATION RATE: 15 BRPM | OXYGEN SATURATION: 97 % | WEIGHT: 209 LBS

## 2025-09-04 DIAGNOSIS — K70.31 ALCOHOLIC CIRRHOSIS OF LIVER WITH ASCITES (HCC): Primary | ICD-10-CM

## 2025-09-04 PROCEDURE — 99214 OFFICE O/P EST MOD 30 MIN: CPT | Performed by: STUDENT IN AN ORGANIZED HEALTH CARE EDUCATION/TRAINING PROGRAM

## 2025-09-04 PROCEDURE — 3078F DIAST BP <80 MM HG: CPT | Performed by: STUDENT IN AN ORGANIZED HEALTH CARE EDUCATION/TRAINING PROGRAM

## 2025-09-04 PROCEDURE — 3074F SYST BP LT 130 MM HG: CPT | Performed by: STUDENT IN AN ORGANIZED HEALTH CARE EDUCATION/TRAINING PROGRAM

## 2025-09-04 RX ORDER — CARVEDILOL 12.5 MG/1
25 TABLET ORAL 2 TIMES DAILY
Qty: 360 TABLET | Refills: 0 | Status: SHIPPED | OUTPATIENT
Start: 2025-09-04

## (undated) DEVICE — BLOCK BITE AD 60FR W/ VELC STRP ADDRESSES MOST PT AND

## (undated) DEVICE — CONNECTOR TBNG OD5-7MM O2 END DISP

## (undated) DEVICE — MOUTHPIECE ENDOSCP L CTRL OPN AND SIDE PORTS DISP

## (undated) DEVICE — CANNULA NSL ORAL AD FOR CAPNOFLEX CO2 O2 AIRLFE

## (undated) DEVICE — GAUZE,SPONGE,4"X4",12PLY,WOVEN,NS,LF: Brand: MEDLINE

## (undated) DEVICE — BALLOON US E LIN RNG O KT FOR FG-32UA

## (undated) DEVICE — KENDALL RADIOLUCENT FOAM MONITORING ELECTRODE RECTANGULAR SHAPE: Brand: KENDALL

## (undated) DEVICE — SINGLE PORT MANIFOLD: Brand: NEPTUNE 2

## (undated) DEVICE — ENDOSCOPIC KIT 1.1+ OP4 CA DE 2 GWN AAMI LEVEL 3

## (undated) DEVICE — AIRLIFE™ OXYGEN TUBING 7 FEET (2.1 M) CRUSH RESISTANT OXYGEN TUBING, VINYL TIPPED: Brand: AIRLIFE™